# Patient Record
Sex: FEMALE | Race: WHITE | Employment: OTHER | URBAN - METROPOLITAN AREA
[De-identification: names, ages, dates, MRNs, and addresses within clinical notes are randomized per-mention and may not be internally consistent; named-entity substitution may affect disease eponyms.]

---

## 2020-06-24 VITALS — HEIGHT: 67 IN | BODY MASS INDEX: 31.39 KG/M2 | WEIGHT: 200 LBS

## 2020-06-24 PROBLEM — I63.9 STROKE (CEREBRUM) (HCC): Status: ACTIVE | Noted: 2020-06-24

## 2020-06-24 PROBLEM — F32.A DEPRESSION: Status: ACTIVE | Noted: 2020-06-24

## 2020-06-24 PROBLEM — I10 HYPERTENSION: Status: ACTIVE | Noted: 2020-06-24

## 2020-06-24 PROBLEM — E78.5 HYPERLIPIDEMIA: Status: ACTIVE | Noted: 2020-06-24

## 2020-06-24 PROBLEM — K21.9 GERD (GASTROESOPHAGEAL REFLUX DISEASE): Status: ACTIVE | Noted: 2020-06-24

## 2020-06-24 PROBLEM — J98.4 PULMONARY DISEASE: Status: ACTIVE | Noted: 2020-06-24

## 2020-06-24 PROBLEM — G47.9 SLEEP DISORDER: Status: ACTIVE | Noted: 2020-06-24

## 2020-06-24 PROBLEM — M19.90 ARTHRITIS: Status: ACTIVE | Noted: 2020-06-24

## 2020-06-24 PROBLEM — F41.9 ANXIETY: Status: ACTIVE | Noted: 2020-06-24

## 2020-06-24 PROBLEM — I51.9 HEART DISEASE: Status: ACTIVE | Noted: 2020-06-24

## 2020-06-24 PROBLEM — D64.9 ANEMIA: Status: ACTIVE | Noted: 2020-06-24

## 2020-06-24 PROBLEM — C50.919 BREAST CANCER (HCC): Status: ACTIVE | Noted: 2020-06-24

## 2020-06-24 RX ORDER — OMEPRAZOLE 40 MG/1
40 CAPSULE, DELAYED RELEASE ORAL DAILY
COMMUNITY
End: 2020-09-21

## 2020-06-24 RX ORDER — AMLODIPINE BESYLATE 2.5 MG/1
TABLET ORAL DAILY
COMMUNITY
End: 2021-04-08

## 2020-06-24 RX ORDER — METOPROLOL SUCCINATE 25 MG/1
TABLET, EXTENDED RELEASE ORAL DAILY
COMMUNITY
End: 2020-12-17 | Stop reason: SDUPTHER

## 2020-06-24 RX ORDER — ATORVASTATIN CALCIUM 40 MG/1
TABLET, FILM COATED ORAL DAILY
COMMUNITY
End: 2020-12-17 | Stop reason: SDUPTHER

## 2020-06-24 RX ORDER — ALPRAZOLAM 1 MG/1
TABLET ORAL 2 TIMES DAILY
COMMUNITY
End: 2020-11-16 | Stop reason: SDUPTHER

## 2020-06-24 RX ORDER — ASPIRIN 81 MG/1
TABLET ORAL DAILY
COMMUNITY

## 2020-06-24 RX ORDER — BUSPIRONE HYDROCHLORIDE 5 MG/1
TABLET ORAL
COMMUNITY
End: 2020-08-13

## 2020-06-24 RX ORDER — ALENDRONATE SODIUM 35 MG/1
TABLET ORAL
COMMUNITY
End: 2020-08-22

## 2020-08-13 ENCOUNTER — VIRTUAL VISIT (OUTPATIENT)
Dept: BEHAVIORAL/MENTAL HEALTH CLINIC | Age: 80
End: 2020-08-13
Payer: MEDICARE

## 2020-08-13 DIAGNOSIS — F41.1 GENERALIZED ANXIETY DISORDER: Primary | ICD-10-CM

## 2020-08-13 PROCEDURE — 99441 PR PHYS/QHP TELEPHONE EVALUATION 5-10 MIN: CPT | Performed by: NURSE PRACTITIONER

## 2020-08-13 NOTE — PROGRESS NOTES
Jessica Steve is a 78 y.o. female who presents today for the following:  Chief Complaint   Patient presents with    Follow-up     \"I'm doing good. \"    Anxiety     Patient consents to phone visit. Allergies   Allergen Reactions    Pcn [Penicillins] Unknown (comments)       Current Outpatient Medications   Medication Sig    alendronate (FOSAMAX) 35 mg tablet Take  by mouth every seven (7) days.  ALPRAZolam (XANAX) 1 mg tablet Take  by mouth two (2) times a day.  amLODIPine (NORVASC) 2.5 mg tablet Take  by mouth daily.  aspirin delayed-release 81 mg tablet Take  by mouth daily.  atorvastatin (LIPITOR) 40 mg tablet Take  by mouth daily.  metoprolol succinate (TOPROL-XL) 25 mg XL tablet Take  by mouth daily. Take two tablets daily    omeprazole (PRILOSEC) 40 mg capsule Take 40 mg by mouth daily. No current facility-administered medications for this visit.         Past Medical History:   Diagnosis Date    Anemia 6/24/2020    Anxiety 6/24/2020    Arthritis 6/24/2020    Breast cancer ([facility] 6/24/2020    Depression 6/24/2020    GERD (gastroesophageal reflux disease) 6/24/2020    Heart disease 6/24/2020    Hyperlipidemia 6/24/2020    Hypertension 6/24/2020    Pulmonary disease 6/24/2020    Sleep disorder 6/24/2020    [facility] 6/24/2020 2012       Past Surgical History:   Procedure Laterality Date    HX BREAST LUMPECTOMY  1996    HX TUBAL LIGATION      [place] SURGERY OF BREAST CAPSULE         Family History   Problem Relation Age of Onset    Hypertension Mother     Hypertension Sister     Alzheimer Sister     Cancer Brother        Social History     Socioeconomic History    Marital status:      Spouse name: Not on file    Number of children: 3    Years of education: Not on file    Highest education level: High school graduate   Occupational History    Not on file   Social Needs    Financial resource strain: Not on file    Food insecurity     Worry: Never true     Inability: Never true    Transportation needs     Medical: No     Non-medical: No   Tobacco Use    Smoking status: Former Smoker    Smokeless tobacco: Never Used   Substance and Sexual Activity    Alcohol use: Not Currently    Drug use: Never    Sexual activity: Not Currently   Lifestyle    Physical activity     Days per week: Not on file     Minutes per session: Not on file    Stress: Not on file   Relationships    Social connections     Talks on phone: Not on file     Gets together: Not on file     Attends Anabaptist service: Not on file     Active member of club or organization: Not on file     Attends meetings of clubs or organizations: Not on file     Relationship status: Not on file    Intimate partner violence     Fear of current or ex partner: Not on file     Emotionally abused: Not on file     Physically abused: Not on file     Forced sexual activity: Not on file   Other Topics Concern     Service Not Asked    Blood Transfusions Not Asked    Caffeine Concern Not Asked    Occupational Exposure Not Asked   Annamarie Estefani Hazards Not Asked    Sleep Concern Not Asked    Stress Concern Not Asked    Weight Concern Not Asked    Special Diet Not Asked    Back Care Not Asked    Exercise Not Asked    Bike Helmet Not Asked   [address] Not Asked    Self-Exams Not Asked   Social History Narrative    Not on file         Mrs. Elizabeth consents to telephone visit. She last visited the clinic February 3, 2020, which she was prescribed BuSpar for anxiety and sleep. Patient reports that BuSpar was ineffective so she stopped taking it. She is using over-the-counter melatonin as needed for sleep. She continues Xanax 1 mg tablet take 1 tablet twice daily as needed for anxiety. Patient reports that she follows up with her cardiologist Dr. Negrita Guzman and primary care as appropriate. According to patient, she had a fall in March. Patient denies history of frequent falling.   According to patient, she slipped and fell off her porch. She denies any serious injuries. Patient also reports that she was treated for \"urinary tract infection and E. Coli\" recently. Patient denies feeling depressed or anxious. She reports stable mood, sleep and appetite. No psychotic symptoms noted. Patient lives alone but talks with her family daily. She reports good family support. Patient denies suicidal and homicidal thinking. Risk for suicide is low-protective factor-family. No smoking, alcohol or drug use noted. Review of Systems   Respiratory: Positive for shortness of breath (chronic). Musculoskeletal: Positive for falls (last fall in March 2020). All other systems reviewed and are negative. There were no vitals taken for this visit. Physical Exam  Neurological:      Mental Status: She is alert and oriented to person, place, and time. Psychiatric:         Attention and Perception: Attention and perception normal.         Mood and Affect: Mood and affect normal.         Speech: Speech normal.         Behavior: Behavior normal. Behavior is cooperative. Thought Content: Thought content normal.         Cognition and Memory: Cognition and memory normal.         Judgment: Judgment normal.         Plan:    Continue Xanax 1 mg tablet take 1 tablet twice daily as needed for anxiety. She may continue over-the-counter melatonin as needed. Patient is advised to avoid alcohol and drug use. Patient is aware of risks associated with benzodiazepine use in the elderly-increased risk for falls and confusion. Patient is reluctant to Xanax dose reduction recommendation and is requesting to continue medication as prescribed. Advised patient to call 911 or go to the emergency department for suicidal or homicidal thinking. There are no diagnoses linked to this encounter.

## 2020-08-22 RX ORDER — ALENDRONATE SODIUM 35 MG/1
TABLET ORAL
Qty: 12 TAB | Refills: 1 | Status: SHIPPED | OUTPATIENT
Start: 2020-08-22 | End: 2021-09-02 | Stop reason: ALTCHOICE

## 2020-09-21 RX ORDER — OMEPRAZOLE 40 MG/1
CAPSULE, DELAYED RELEASE ORAL
Qty: 90 CAP | Refills: 1 | Status: SHIPPED | OUTPATIENT
Start: 2020-09-21 | End: 2021-03-11 | Stop reason: SDUPTHER

## 2020-10-08 ENCOUNTER — TELEPHONE (OUTPATIENT)
Dept: PRIMARY CARE CLINIC | Age: 80
End: 2020-10-08

## 2020-10-08 DIAGNOSIS — Z13.820 SCREENING FOR OSTEOPOROSIS: Primary | ICD-10-CM

## 2020-10-12 DIAGNOSIS — M81.0 AGE-RELATED OSTEOPOROSIS WITHOUT CURRENT PATHOLOGICAL FRACTURE: Primary | ICD-10-CM

## 2020-10-19 ENCOUNTER — OFFICE VISIT (OUTPATIENT)
Dept: PRIMARY CARE CLINIC | Age: 80
End: 2020-10-19
Payer: MEDICARE

## 2020-10-19 DIAGNOSIS — Z23 ENCOUNTER FOR IMMUNIZATION: Primary | ICD-10-CM

## 2020-10-19 PROCEDURE — 90756 CCIIV4 VACC ABX FREE IM: CPT | Performed by: FAMILY MEDICINE

## 2020-10-19 PROCEDURE — G0008 ADMIN INFLUENZA VIRUS VAC: HCPCS | Performed by: FAMILY MEDICINE

## 2020-11-05 ENCOUNTER — OFFICE VISIT (OUTPATIENT)
Dept: PRIMARY CARE CLINIC | Age: 80
End: 2020-11-05
Payer: MEDICARE

## 2020-11-05 VITALS
BODY MASS INDEX: 31.13 KG/M2 | DIASTOLIC BLOOD PRESSURE: 78 MMHG | HEART RATE: 78 BPM | WEIGHT: 198.38 LBS | RESPIRATION RATE: 18 BRPM | HEIGHT: 67 IN | TEMPERATURE: 96 F | OXYGEN SATURATION: 94 % | SYSTOLIC BLOOD PRESSURE: 144 MMHG

## 2020-11-05 DIAGNOSIS — K21.9 GASTROESOPHAGEAL REFLUX DISEASE WITHOUT ESOPHAGITIS: ICD-10-CM

## 2020-11-05 DIAGNOSIS — I10 ESSENTIAL HYPERTENSION: ICD-10-CM

## 2020-11-05 DIAGNOSIS — M81.0 AGE-RELATED OSTEOPOROSIS WITHOUT CURRENT PATHOLOGICAL FRACTURE: ICD-10-CM

## 2020-11-05 DIAGNOSIS — I51.9 HEART DISEASE: Primary | ICD-10-CM

## 2020-11-05 DIAGNOSIS — I63.9 CEREBROVASCULAR ACCIDENT (CVA), UNSPECIFIED MECHANISM (HCC): ICD-10-CM

## 2020-11-05 DIAGNOSIS — E55.9 VITAMIN D DEFICIENCY: ICD-10-CM

## 2020-11-05 DIAGNOSIS — J45.20 MILD INTERMITTENT ASTHMA WITHOUT COMPLICATION: ICD-10-CM

## 2020-11-05 DIAGNOSIS — R53.83 OTHER FATIGUE: ICD-10-CM

## 2020-11-05 PROBLEM — G47.33 OBSTRUCTIVE SLEEP APNEA SYNDROME: Status: ACTIVE | Noted: 2020-11-05

## 2020-11-05 PROCEDURE — 99214 OFFICE O/P EST MOD 30 MIN: CPT | Performed by: FAMILY MEDICINE

## 2020-11-05 RX ORDER — LANOLIN ALCOHOL/MO/W.PET/CERES
9 CREAM (GRAM) TOPICAL
COMMUNITY

## 2020-11-05 RX ORDER — GLUCOSAMINE SULFATE 1500 MG
1000 POWDER IN PACKET (EA) ORAL DAILY
COMMUNITY

## 2020-11-05 RX ORDER — ALBUTEROL SULFATE 90 UG/1
2 AEROSOL, METERED RESPIRATORY (INHALATION)
Qty: 1 INHALER | Refills: 3 | Status: SHIPPED | OUTPATIENT
Start: 2020-11-05 | End: 2022-07-14 | Stop reason: SDUPTHER

## 2020-11-05 NOTE — PROGRESS NOTES
Ekaterina Castro is a [de-identified] y.o. female who presents today for the following:  Chief Complaint   Patient presents with    Fall     States fell Oct. 26,2020 and wants to ask some questions re: this fall. Had bone density scheduled but unable to go due to fall.  Request For New Medication     Wants to discuss injection for Osteoporosis instead of taking the Fosamax every day.   ,     ICD-10-CM ICD-9-CM    1. Heart disease  I51.9 429.9 LIPID PANEL   2. Essential hypertension  I10 401.9 CBC WITH AUTOMATED DIFF      METABOLIC PANEL, COMPREHENSIVE      URINALYSIS W/ REFLEX CULTURE   3. Cerebrovascular accident (CVA), unspecified mechanism ([facility]  I63.9 434.91    4. Gastroesophageal reflux disease without esophagitis  K21.9 530.81    5. Mild intermittent asthma without complication  J32.90 473.57 albuterol (PROVENTIL HFA, VENTOLIN HFA, PROAIR HFA) 90 mcg/actuation inhaler   6. Vitamin D deficiency  E55.9 268.9 VITAMIN D, 25 HYDROXY   7. Age-related osteoporosis without current pathological fracture  M81.0 733.01 VITAMIN D, 25 HYDROXY   8. Other fatigue  R53.83 780.79 TSH 3RD GENERATION    . This patient comes in for follow-up of her hypertension, osteoporosis, vitamin D deficiency, fatigue, intermittent asthma. Her main complaint is that she fell and has some discomfort in the knees this is slowly improving but she is concerned something else is going on. Discussed getting x-rays but she does not feel she needs this yet. She would like to use Prolia instead of the Fosamax. Has a bone density test scheduled that she delayed after the fall and will plan to do this in the next week. We will see what her bone density test shows and make a final decision on that when the results are back. She says she continues to feel fatigued. We have checked test in the past for this. We will redo lab work and see if we have a reason that she stays fatigued.   This could be related to her age and multiple medical problems. Allergies   Allergen Reactions    Incruse Ellipta [Umeclidinium] Other (comments)     Throat felt funny and red on the outside on neck area.  Pcn [Penicillins] Unknown (comments)     States was in hospital a long time ago (in her 19's). Does'nt remember reaction she had. Current Outpatient Medications   Medication Sig    melatonin 3 mg tablet Take 3 mg by mouth nightly as needed for Insomnia.  cholecalciferol (Vitamin D3) 25 mcg (1,000 unit) cap Take 1,000 Units by mouth daily.  albuterol (PROVENTIL HFA, VENTOLIN HFA, PROAIR HFA) 90 mcg/actuation inhaler Take 2 Puffs by inhalation every six (6) hours as needed for Wheezing.  omeprazole (PRILOSEC) 40 mg capsule TAKE 1 CAPSULE BY MOUTH EVERY DAY    ALPRAZolam (XANAX) 1 mg tablet Take  by mouth two (2) times a day.  amLODIPine (NORVASC) 2.5 mg tablet Take  by mouth daily.  aspirin delayed-release 81 mg tablet Take  by mouth daily.  atorvastatin (LIPITOR) 40 mg tablet Take  by mouth daily.  metoprolol succinate (TOPROL-XL) 25 mg XL tablet Take  by mouth daily. Take two tablets daily    alendronate (FOSAMAX) 35 mg tablet TAKE 1 TABLET WEEKLY ON AN EMPTY STOMACH 30-60 MIN BEFORE BREAKFAST     No current facility-administered medications for this visit.         Past Medical History:   Diagnosis Date    Anemia 6/24/2020    Anxiety 6/24/2020    Arthritis 6/24/2020    Breast cancer ([facility] 6/24/2020    Depression 6/24/2020    GERD (gastroesophageal reflux disease) 6/24/2020    Heart disease 6/24/2020    Hyperlipidemia 6/24/2020    Hypertension 6/24/2020    Pulmonary disease 6/24/2020    Sleep disorder 6/24/2020    Stroke (cerebrum) ([address] 6/24/2020 2012       Past Surgical History:   Procedure Laterality Date    HX BREAST LUMPECTOMY  1996    HX TUBAL LIGATION      [place] SURGERY OF BREAST CAPSULE         Family History   Problem Relation Age of Onset    Hypertension Mother     Hypertension Sister     Alzheimer Sister  Cancer Brother        Social History     Socioeconomic History    Marital status:      Spouse name: Not on file    Number of children: 3    Years of education: Not on file    Highest education level: High school graduate   Occupational History    Not on file   Social Needs    Financial resource strain: Not on file    Food insecurity     Worry: Never true     Inability: Never true    Transportation needs     Medical: No     Non-medical: No   Tobacco Use    Smoking status: Former Smoker    Smokeless tobacco: Never Used   Substance and Sexual Activity    Alcohol use: Not Currently    Drug use: Never    Sexual activity: Not Currently   Lifestyle    Physical activity     Days per week: Not on file     Minutes per session: Not on file    Stress: Not on file   Relationships    Social connections     Talks on phone: Not on file     Gets together: Not on file     Attends Hindu service: Not on file     Active member of club or organization: Not on file     Attends meetings of clubs or organizations: Not on file     Relationship status: Not on file    Intimate partner violence     Fear of current or ex partner: Not on file     Emotionally abused: Not on file     Physically abused: Not on file     Forced sexual activity: Not on file   Other Topics Concern     Service Not Asked    Blood Transfusions Not Asked    Caffeine Concern Not Asked    Occupational Exposure Not Asked    Hobby Hazards Not Asked    Sleep Concern Not Asked    Stress Concern Not Asked    Weight Concern Not Asked    Special Diet Not Asked    Back Care Not Asked    Exercise Not Asked    Bike Helmet Not Asked   [address] Not Asked    Self-Exams Not Asked   Social History Narrative    Not on file         Review of Systems   Constitutional: Negative. Respiratory: Negative. Cardiovascular: Negative. Gastrointestinal: Negative. Genitourinary: Positive for hematuria.    Musculoskeletal: Positive for falls, joint pain, myalgias and neck pain. Neurological: Positive for weakness and headaches. Psychiatric/Behavioral: Negative. All other systems reviewed and are negative. Visit Vitals  BP (!) 144/78 (BP 1 Location: Left arm, BP Patient Position: Sitting)   Pulse 78   Temp (!) 96 °F (35.6 °C) (Skin)   Resp 18   Ht 5' 7\" (1.702 m)   Wt 198 lb 6 oz (90 kg)   SpO2 94%   BMI 31.07 kg/m²       Physical Exam  Vitals signs and nursing note reviewed. Constitutional:       Appearance: Normal appearance. She is normal weight. Neck:      Musculoskeletal: Neck supple. Muscular tenderness present. Cardiovascular:      Rate and Rhythm: Normal rate and regular rhythm. Pulses: Normal pulses. Heart sounds: Normal heart sounds. Pulmonary:      Effort: Pulmonary effort is normal.      Breath sounds: Normal breath sounds. Abdominal:      General: Abdomen is flat. Bowel sounds are normal.      Palpations: Abdomen is soft. Musculoskeletal: Normal range of motion. Skin:     General: Skin is warm and dry. Findings: Bruising (Left knee) present. Neurological:      General: No focal deficit present. Mental Status: She is alert. Psychiatric:         Mood and Affect: Mood normal.         Behavior: Behavior normal.         Thought Content: Thought content normal.         Judgment: Judgment normal.            ICD-10-CM ICD-9-CM    1. Heart disease  I51.9 429.9 LIPID PANEL   2. Essential hypertension  I10 401.9 CBC WITH AUTOMATED DIFF      METABOLIC PANEL, COMPREHENSIVE      URINALYSIS W/ REFLEX CULTURE   3. Cerebrovascular accident (CVA), unspecified mechanism ([facility]  I63.9 434.91    4. Gastroesophageal reflux disease without esophagitis  K21.9 530.81    5. Mild intermittent asthma without complication  J12.83 278.95 albuterol (PROVENTIL HFA, VENTOLIN HFA, PROAIR HFA) 90 mcg/actuation inhaler   6.  Vitamin D deficiency  E55.9 268.9 VITAMIN D, 25 HYDROXY   7. Age-related osteoporosis without current pathological fracture  M81.0 733.01 VITAMIN D, 25 HYDROXY   8. Other fatigue  R53.83 780.79 TSH 3RD GENERATION        1. Heart disease    - LIPID PANEL    2. Essential hypertension    - CBC WITH AUTOMATED DIFF  - METABOLIC PANEL, COMPREHENSIVE  - URINALYSIS W/ REFLEX CULTURE    3. Cerebrovascular accident (CVA), unspecified mechanism ([facility]      4. Gastroesophageal reflux disease without esophagitis      5. Mild intermittent asthma without complication  Stable and she uses her inhaler as needed. - albuterol (PROVENTIL HFA, VENTOLIN HFA, PROAIR HFA) 90 mcg/actuation inhaler; Take 2 Puffs by inhalation every six (6) hours as needed for Wheezing. Dispense: 1 Inhaler; Refill: 3    6. Vitamin D deficiency    - VITAMIN D, 25 HYDROXY    7. Age-related osteoporosis without current pathological fracture    - VITAMIN D, 25 HYDROXY    8.  Other fatigue    - TSH 3RD GENERATION

## 2020-11-06 LAB
25(OH)D3+25(OH)D2 SERPL-MCNC: 48 NG/ML (ref 30–100)
ALBUMIN SERPL-MCNC: 4.1 G/DL (ref 3.7–4.7)
ALBUMIN/GLOB SERPL: 1.7 {RATIO} (ref 1.2–2.2)
ALP SERPL-CCNC: 111 IU/L (ref 39–117)
ALT SERPL-CCNC: 18 IU/L (ref 0–32)
APPEARANCE UR: CLEAR
AST SERPL-CCNC: 26 IU/L (ref 0–40)
BACTERIA #/AREA URNS HPF: ABNORMAL /[HPF]
BASOPHILS # BLD AUTO: 0 X10E3/UL (ref 0–0.2)
BASOPHILS NFR BLD AUTO: 1 %
BILIRUB SERPL-MCNC: 0.3 MG/DL (ref 0–1.2)
BILIRUB UR QL STRIP: NEGATIVE
BUN SERPL-MCNC: 17 MG/DL (ref 8–27)
BUN/CREAT SERPL: 23 (ref 12–28)
CALCIUM SERPL-MCNC: 9.2 MG/DL (ref 8.7–10.3)
CASTS URNS MICRO: ABNORMAL
CASTS URNS QL MICRO: PRESENT /LPF
CHLORIDE SERPL-SCNC: 105 MMOL/L (ref 96–106)
CHOLEST SERPL-MCNC: 153 MG/DL (ref 100–199)
CO2 SERPL-SCNC: 25 MMOL/L (ref 20–29)
COLOR UR: YELLOW
CREAT SERPL-MCNC: 0.75 MG/DL (ref 0.57–1)
EOSINOPHIL # BLD AUTO: 0.2 X10E3/UL (ref 0–0.4)
EOSINOPHIL NFR BLD AUTO: 3 %
EPI CELLS #/AREA URNS HPF: ABNORMAL /HPF (ref 0–10)
ERYTHROCYTE [DISTWIDTH] IN BLOOD BY AUTOMATED COUNT: 13 % (ref 11.7–15.4)
GLOBULIN SER CALC-MCNC: 2.4 G/DL (ref 1.5–4.5)
GLUCOSE SERPL-MCNC: 103 MG/DL (ref 65–99)
GLUCOSE UR QL: NEGATIVE
HCT VFR BLD AUTO: 35.9 % (ref 34–46.6)
HDLC SERPL-MCNC: 65 MG/DL
HGB BLD-MCNC: 11.9 G/DL (ref 11.1–15.9)
HGB UR QL STRIP: NEGATIVE
IMM GRANULOCYTES # BLD AUTO: 0 X10E3/UL (ref 0–0.1)
IMM GRANULOCYTES NFR BLD AUTO: 0 %
KETONES UR QL STRIP: ABNORMAL
LDLC SERPL CALC-MCNC: 71 MG/DL (ref 0–99)
LEUKOCYTE ESTERASE UR QL STRIP: NEGATIVE
LYMPHOCYTES # BLD AUTO: 1.7 X10E3/UL (ref 0.7–3.1)
LYMPHOCYTES NFR BLD AUTO: 30 %
MCH RBC QN AUTO: 28.7 PG (ref 26.6–33)
MCHC RBC AUTO-ENTMCNC: 33.1 G/DL (ref 31.5–35.7)
MCV RBC AUTO: 87 FL (ref 79–97)
MICRO URNS: ABNORMAL
MICRO URNS: ABNORMAL
MONOCYTES # BLD AUTO: 0.4 X10E3/UL (ref 0.1–0.9)
MONOCYTES NFR BLD AUTO: 7 %
MUCOUS THREADS URNS QL MICRO: PRESENT
NEUTROPHILS # BLD AUTO: 3.3 X10E3/UL (ref 1.4–7)
NEUTROPHILS NFR BLD AUTO: 59 %
NITRITE UR QL STRIP: NEGATIVE
PH UR STRIP: 6 [PH] (ref 5–7.5)
PLATELET # BLD AUTO: 202 X10E3/UL (ref 150–450)
POTASSIUM SERPL-SCNC: 4.3 MMOL/L (ref 3.5–5.2)
PROT SERPL-MCNC: 6.5 G/DL (ref 6–8.5)
PROT UR QL STRIP: NEGATIVE
RBC # BLD AUTO: 4.14 X10E6/UL (ref 3.77–5.28)
RBC #/AREA URNS HPF: ABNORMAL /HPF (ref 0–2)
SODIUM SERPL-SCNC: 142 MMOL/L (ref 134–144)
SP GR UR: 1.02 (ref 1–1.03)
TRIGL SERPL-MCNC: 90 MG/DL (ref 0–149)
TSH SERPL DL<=0.005 MIU/L-ACNC: 1.94 UIU/ML (ref 0.45–4.5)
URINALYSIS REFLEX, 377202: ABNORMAL
UROBILINOGEN UR STRIP-MCNC: 0.2 MG/DL (ref 0.2–1)
VLDLC SERPL CALC-MCNC: 17 MG/DL (ref 5–40)
WBC # BLD AUTO: 5.6 X10E3/UL (ref 3.4–10.8)
WBC #/AREA URNS HPF: ABNORMAL /HPF (ref 0–5)

## 2020-11-16 ENCOUNTER — VIRTUAL VISIT (OUTPATIENT)
Dept: BEHAVIORAL/MENTAL HEALTH CLINIC | Age: 80
End: 2020-11-16
Payer: MEDICARE

## 2020-11-16 DIAGNOSIS — F32.5 MAJOR DEPRESSION IN REMISSION (HCC): ICD-10-CM

## 2020-11-16 DIAGNOSIS — F41.1 GENERALIZED ANXIETY DISORDER: Primary | ICD-10-CM

## 2020-11-16 PROCEDURE — 99441 PR PHYS/QHP TELEPHONE EVALUATION 5-10 MIN: CPT | Performed by: NURSE PRACTITIONER

## 2020-11-16 RX ORDER — ALPRAZOLAM 1 MG/1
1 TABLET ORAL
Qty: 60 TAB | Refills: 0 | Status: SHIPPED | OUTPATIENT
Start: 2020-11-16 | End: 2021-01-06

## 2020-11-16 NOTE — PROGRESS NOTES
Jared Bassett is a [de-identified] y.o. female who presents today for the following:  Chief Complaint   Patient presents with    Follow-up     \"I'm tired of staying at home. \"    Anxiety     Patient is unable to do virtual visit, telephone visit required. Allergies   Allergen Reactions    Incruse Ellipta [Umeclidinium] Other (comments)     Throat felt funny and red on the outside on neck area.  Pcn [Penicillins] Unknown (comments)     States was in hospital a long time ago (in her 19's). Does'nt remember reaction she had. Current Outpatient Medications   Medication Sig    melatonin 3 mg tablet Take 3 mg by mouth nightly as needed for Insomnia.  cholecalciferol (Vitamin D3) 25 mcg (1,000 unit) cap Take 1,000 Units by mouth daily.  albuterol (PROVENTIL HFA, VENTOLIN HFA, PROAIR HFA) 90 mcg/actuation inhaler Take 2 Puffs by inhalation every six (6) hours as needed for Wheezing.  omeprazole (PRILOSEC) 40 mg capsule TAKE 1 CAPSULE BY MOUTH EVERY DAY    alendronate (FOSAMAX) 35 mg tablet TAKE 1 TABLET WEEKLY ON AN EMPTY STOMACH 30-60 MIN BEFORE BREAKFAST    ALPRAZolam (XANAX) 1 mg tablet Take  by mouth two (2) times a day.  amLODIPine (NORVASC) 2.5 mg tablet Take  by mouth daily.  aspirin delayed-release 81 mg tablet Take  by mouth daily.  atorvastatin (LIPITOR) 40 mg tablet Take  by mouth daily.  metoprolol succinate (TOPROL-XL) 25 mg XL tablet Take  by mouth daily. Take two tablets daily     No current facility-administered medications for this visit.         Past Medical History:   Diagnosis Date    Anemia 6/24/2020    Anxiety 6/24/2020    Arthritis 6/24/2020    Breast cancer ([facility] 6/24/2020    Depression 6/24/2020    GERD (gastroesophageal reflux disease) 6/24/2020    Heart disease 6/24/2020    Hyperlipidemia 6/24/2020    Hypertension 6/24/2020    Pulmonary disease 6/24/2020    Sleep disorder 6/24/2020    Stroke (cerebrum) ([facility] 6/24/2020 2012       Past Surgical History: Procedure Laterality Date    HX BREAST LUMPECTOMY  1996    HX TUBAL LIGATION      [place] SURGERY OF BREAST CAPSULE         Family History   Problem Relation Age of Onset    Hypertension Mother     Hypertension Sister     Alzheimer Sister     Cancer Brother        Social History     Socioeconomic History    Marital status:      Spouse name: Not on file    Number of children: 3    Years of education: Not on file    Highest education level: High school graduate   Occupational History    Not on file   Social Needs    Financial resource strain: Not on file    Food insecurity     Worry: Never true     Inability: Never true    Transportation needs     Medical: No     Non-medical: No   Tobacco Use    Smoking status: Former Smoker    Smokeless tobacco: Never Used   Substance and Sexual Activity    Alcohol use: Not Currently    Drug use: Never    Sexual activity: Not Currently   Lifestyle    Physical activity     Days per week: Not on file     Minutes per session: Not on file    Stress: Not on file   Relationships    Social connections     Talks on phone: Not on file     Gets together: Not on file     Attends Jainism service: Not on file     Active member of club or organization: Not on file     Attends meetings of clubs or organizations: Not on file     Relationship status: Not on file    Intimate partner violence     Fear of current or ex partner: Not on file     Emotionally abused: Not on file     Physically abused: Not on file     Forced sexual activity: Not on file   Other Topics Concern     Service Not Asked    Blood Transfusions Not Asked    Caffeine Concern Not Asked    Occupational Exposure Not Asked    Hobby Hazards Not Asked    Sleep Concern Not Asked    Stress Concern Not Asked    Weight Concern Not Asked    Special Diet Not Asked    Back Care Not Asked    Exercise Not Asked    Bike Helmet Not Asked    Seat Belt Not Asked    Self-Exams Not Asked   Social History Narrative    Not on file         Ms. Elizabeth consents to phone visit. Patient is prescribed Xanax 1 mg tablet take 1 tablet twice daily as needed for anxiety. She follows up with Dr. Hugh Rapp for primary care. Patient follows up in the clinic for generalized anxiety disorder. On today's visit, she is requesting xanax refill. Patient denies feeling depressed or anxious on today's visit. She reports having some fleeting anxiety episodes which is well controlled with Xanax as needed. Patient shares that most of her anxiety is related to her neighbor's cats getting on her car. She is requesting to continue Xanax as prescribed. Patient is fully aware of risks associated with benzodiazepine use in the elderly such as increased risk for falls and confusion. Patient reports that she had a fall October 26, 2020 when she tripped over a wire. She reports a neighbor helped her up. Patient reports that she followed up with her primary care for medical evaluation which everything \"checked out okay. \"  She reports fluctuating sleep and appetite. Weight has been stable. No psychotic symptoms reported. Patient denies suicidal/homicidal thinking, risk for suicide is low. No smoking, alcohol or drug use noted. Patient lives alone with good family and social support. Review of Systems   Musculoskeletal: Positive for joint pain. All other systems reviewed and are negative. There were no vitals taken for this visit. Physical Exam  Psychiatric:         Attention and Perception: Attention and perception normal.         Mood and Affect: Mood normal.         Speech: Speech normal.         Behavior: Behavior normal. Behavior is cooperative. Thought Content: Thought content normal.         Cognition and Memory: Cognition and memory normal.         Judgment: Judgment normal.        Plan: At the patient's request, continue Xanax 1 mg tablet take 1 tablet twice daily as needed for anxiety.   Follow-up with primary care provider as appropriate. Advised patient to call 911 or go to the emergency department for emergencies and suicidal/homicidal thinking. Advised patient to avoid alcohol and drug use. There are no diagnoses linked to this encounter.

## 2020-11-19 ENCOUNTER — TELEPHONE (OUTPATIENT)
Dept: PRIMARY CARE CLINIC | Age: 80
End: 2020-11-19

## 2020-11-19 NOTE — TELEPHONE ENCOUNTER
----- Message from Nelida Dent MD sent at 11/18/2020  1:31 PM EST -----  Inform the patient that her lab results were normal except for the following:  Her lab work was all normal.  Continue the current medication and work on exercise and weight loss if possible. Recommend fasting office visit in 6 months.

## 2020-12-17 ENCOUNTER — TELEPHONE (OUTPATIENT)
Dept: PRIMARY CARE CLINIC | Age: 80
End: 2020-12-17

## 2020-12-17 ENCOUNTER — TELEPHONE (OUTPATIENT)
Dept: BEHAVIORAL/MENTAL HEALTH CLINIC | Age: 80
End: 2020-12-17

## 2020-12-17 DIAGNOSIS — I10 ESSENTIAL HYPERTENSION: ICD-10-CM

## 2020-12-17 DIAGNOSIS — I10 ESSENTIAL HYPERTENSION: Primary | ICD-10-CM

## 2020-12-17 DIAGNOSIS — E78.5 HYPERLIPIDEMIA, UNSPECIFIED HYPERLIPIDEMIA TYPE: ICD-10-CM

## 2020-12-17 RX ORDER — ATORVASTATIN CALCIUM 40 MG/1
40 TABLET, FILM COATED ORAL DAILY
Qty: 90 TAB | Refills: 0 | Status: SHIPPED | OUTPATIENT
Start: 2020-12-17 | End: 2021-03-08 | Stop reason: SDUPTHER

## 2020-12-17 RX ORDER — METOPROLOL SUCCINATE 25 MG/1
25 TABLET, EXTENDED RELEASE ORAL DAILY
Qty: 90 TAB | Refills: 0 | Status: SHIPPED | OUTPATIENT
Start: 2020-12-17 | End: 2021-01-29 | Stop reason: SDUPTHER

## 2021-01-06 DIAGNOSIS — F41.1 GENERALIZED ANXIETY DISORDER: ICD-10-CM

## 2021-01-06 RX ORDER — ALPRAZOLAM 1 MG/1
1 TABLET ORAL
Qty: 60 TAB | Refills: 0 | Status: SHIPPED | OUTPATIENT
Start: 2021-01-06 | End: 2021-02-05

## 2021-01-22 ENCOUNTER — TRANSCRIBE ORDER (OUTPATIENT)
Dept: SCHEDULING | Age: 81
End: 2021-01-22

## 2021-01-22 DIAGNOSIS — R01.1 MURMUR: Primary | ICD-10-CM

## 2021-01-29 DIAGNOSIS — I10 ESSENTIAL HYPERTENSION: ICD-10-CM

## 2021-01-29 RX ORDER — METOPROLOL SUCCINATE 25 MG/1
25 TABLET, EXTENDED RELEASE ORAL DAILY
Qty: 90 TAB | Refills: 0 | Status: SHIPPED | OUTPATIENT
Start: 2021-01-29 | End: 2021-03-17 | Stop reason: SDUPTHER

## 2021-02-15 ENCOUNTER — VIRTUAL VISIT (OUTPATIENT)
Dept: BEHAVIORAL/MENTAL HEALTH CLINIC | Age: 81
End: 2021-02-15
Payer: MEDICARE

## 2021-02-15 DIAGNOSIS — F41.1 GENERALIZED ANXIETY DISORDER: Primary | ICD-10-CM

## 2021-02-15 PROCEDURE — G8536 NO DOC ELDER MAL SCRN: HCPCS | Performed by: NURSE PRACTITIONER

## 2021-02-15 PROCEDURE — 1100F PTFALLS ASSESS-DOCD GE2>/YR: CPT | Performed by: NURSE PRACTITIONER

## 2021-02-15 PROCEDURE — 99442 PR PHYS/QHP TELEPHONE EVALUATION 11-20 MIN: CPT | Performed by: NURSE PRACTITIONER

## 2021-02-15 PROCEDURE — 1090F PRES/ABSN URINE INCON ASSESS: CPT | Performed by: NURSE PRACTITIONER

## 2021-02-15 PROCEDURE — G8756 NO BP MEASURE DOC: HCPCS | Performed by: NURSE PRACTITIONER

## 2021-02-15 PROCEDURE — G8400 PT W/DXA NO RESULTS DOC: HCPCS | Performed by: NURSE PRACTITIONER

## 2021-02-15 PROCEDURE — 3288F FALL RISK ASSESSMENT DOCD: CPT | Performed by: NURSE PRACTITIONER

## 2021-02-15 PROCEDURE — G8417 CALC BMI ABV UP PARAM F/U: HCPCS | Performed by: NURSE PRACTITIONER

## 2021-02-15 PROCEDURE — G9717 DOC PT DX DEP/BP F/U NT REQ: HCPCS | Performed by: NURSE PRACTITIONER

## 2021-02-15 PROCEDURE — G8427 DOCREV CUR MEDS BY ELIG CLIN: HCPCS | Performed by: NURSE PRACTITIONER

## 2021-02-15 RX ORDER — ALPRAZOLAM 1 MG/1
1 TABLET ORAL
Qty: 60 TAB | Refills: 2 | Status: SHIPPED | OUTPATIENT
Start: 2021-02-15 | End: 2021-03-17

## 2021-02-15 NOTE — PROGRESS NOTES
Octavio Eller is a [de-identified] y.o. female who presents today for the following:  Chief Complaint   Patient presents with    Follow-up     \"I need another refill. \"    Medication Management    Leola Blair, who was evaluated through a synchronous (real-time) audio telephone encounter, and/or her healthcare decision maker, is aware that it is a billable service, with coverage as determined by her insurance carrier. She provided verbal consent to proceed: YES Consent obtained within past 12 months: Yes, and patient identification was verified. It was conducted pursuant to the emergency declaration under the [facility], [address] [place] [address] authority and the [employer] Act. A caregiver was present when appropriate. Ability to conduct physical exam was limited. I was home. The patient was home. Length of visit 11 minutes and 19 seconds. Allergies   Allergen Reactions    Incruse Ellipta [Umeclidinium] Other (comments)     Throat felt funny and red on the outside on neck area.  Pcn [Penicillins] Unknown (comments)     States was in hospital a long time ago (in her 19's). Does'nt remember reaction she had. Current Outpatient Medications   Medication Sig    ALPRAZolam (XANAX) 1 mg tablet Take 1 Tab by mouth two (2) times daily as needed for Anxiety for up to 30 days. Max Daily Amount: 2 mg.  metoprolol succinate (TOPROL-XL) 25 mg XL tablet Take 1 Tab by mouth daily. Take two tablets daily    atorvastatin (LIPITOR) 40 mg tablet Take 1 Tab by mouth daily.  melatonin 3 mg tablet Take 3 mg by mouth nightly as needed for Insomnia.  cholecalciferol (Vitamin D3) 25 mcg (1,000 unit) cap Take 1,000 Units by mouth daily.  albuterol (PROVENTIL HFA, VENTOLIN HFA, PROAIR HFA) 90 mcg/actuation inhaler Take 2 Puffs by inhalation every six (6) hours as needed for Wheezing.     omeprazole (PRILOSEC) 40 mg capsule TAKE 1 CAPSULE BY MOUTH EVERY DAY    alendronate (FOSAMAX) 35 mg tablet TAKE 1 TABLET WEEKLY ON AN EMPTY STOMACH 30-60 MIN BEFORE BREAKFAST    amLODIPine (NORVASC) 2.5 mg tablet Take  by mouth daily.  aspirin delayed-release 81 mg tablet Take  by mouth daily. No current facility-administered medications for this visit.         Past Medical History:   Diagnosis Date    Anemia 6/24/2020    Anxiety 6/24/2020    Arthritis 6/24/2020    Breast cancer ([facility] 6/24/2020    Depression 6/24/2020    GERD (gastroesophageal reflux disease) 6/24/2020    Heart disease 6/24/2020    Hyperlipidemia 6/24/2020    Hypertension 6/24/2020    Pulmonary disease 6/24/2020    Sleep disorder 6/24/2020    Stroke (cerebrum) ([facility] 6/24/2020 2012       Past Surgical History:   Procedure Laterality Date    HX BREAST LUMPECTOMY  1996    HX TUBAL LIGATION      MT REVISION AMBROSE-IMPLANT CAPSULE BREAST         Family History   Problem Relation Age of Onset    Hypertension Mother     Hypertension Sister     Alzheimer Sister     Cancer Brother        Social History     Socioeconomic History    Marital status:      Spouse name: Not on file    Number of children: 3    Years of education: Not on file    Highest education level: High school graduate   Occupational History    Not on file   Social Needs    Financial resource strain: Not on file    Food insecurity     Worry: Never true     Inability: Never true    Transportation needs     Medical: No     Non-medical: No   Tobacco Use    Smoking status: Former Smoker    Smokeless tobacco: Never Used   Substance and Sexual Activity    Alcohol use: Not Currently    Drug use: Never    Sexual activity: Not Currently   Lifestyle    Physical activity     Days per week: Not on file     Minutes per session: Not on file    Stress: Not on file   Relationships    Social connections     Talks on phone: Not on file     Gets together: Not on file     Attends Zoroastrian service: Not on file     Active member of club or organization: Not on file     Attends meetings of clubs or organizations: Not on file     Relationship status: Not on file    Intimate partner violence     Fear of current or ex partner: Not on file     Emotionally abused: Not on file     Physically abused: Not on file     Forced sexual activity: Not on file   Other Topics Concern     Service Not Asked    Blood Transfusions Not Asked    Caffeine Concern Not Asked    Occupational Exposure Not Asked   [employer] Dileep Hazards Not Asked    Sleep Concern Not Asked    Stress Concern Not Asked    Weight Concern Not Asked    Special Diet Not Asked    Back Care Not Asked    Exercise Not Asked    Bike Helmet Not Asked   [address] Not Asked    Self-Exams Not Asked   Social History Narrative    Not on file         Ms. Rere Khan is unable to do virtual visit, telephone visit required. Patient is prescribed Xanax 1 mg tablet take 1 tablet twice daily as needed for anxiety. Today, she is requesting Xanax refill. Patient reports doing well since last visit November 16, 2020. She denies feeling anxious or depressed. Patient is not interested in trial Xanax dose reduction at this time. No problems with sleep and appetite. Patient denies psychotic symptoms. No suicidal/homicidal thinking noted, risk for suicide is low, protective factor-family. Patient reports her family is very supportive. Patient shares that her son from [place] may visit her soon. She reports that she tries to stay in as much as possible. She denies smoking, alcohol and drug use. Patient lives alone with good family and social support. Review of Systems   All other systems reviewed and are negative. Patient denies any physical issues. There were no vitals taken for this visit.   Physical Exam  Psychiatric:         Attention and Perception: Attention and perception normal.         Mood and Affect: Mood normal.         Speech: Speech normal.         Behavior: Behavior normal. Behavior is cooperative. Thought Content: Thought content normal.         Cognition and Memory: Cognition and memory normal.         Judgment: Judgment normal.        Plan:    Continue Xanax 1 mg tablet take 1 tablet 2 times daily as needed for anxiety. Advised patient to avoid alcohol and drug use. Follow-up with medical provider as appropriate. Advised patient to call 911 or go to the emergency department for suicidal/homicidal thinking. Patient may call the office for any issues.

## 2021-03-08 DIAGNOSIS — E78.5 HYPERLIPIDEMIA, UNSPECIFIED HYPERLIPIDEMIA TYPE: ICD-10-CM

## 2021-03-08 DIAGNOSIS — E78.5 HYPERLIPIDEMIA, UNSPECIFIED HYPERLIPIDEMIA TYPE: Primary | ICD-10-CM

## 2021-03-08 RX ORDER — ATORVASTATIN CALCIUM 40 MG/1
TABLET, FILM COATED ORAL
Qty: 90 TAB | Refills: 0 | OUTPATIENT
Start: 2021-03-08

## 2021-03-08 RX ORDER — ATORVASTATIN CALCIUM 40 MG/1
40 TABLET, FILM COATED ORAL DAILY
Qty: 90 TAB | Refills: 0 | Status: CANCELLED | OUTPATIENT
Start: 2021-03-08

## 2021-03-08 RX ORDER — ATORVASTATIN CALCIUM 40 MG/1
40 TABLET, FILM COATED ORAL DAILY
Qty: 90 TAB | Refills: 0 | Status: SHIPPED | OUTPATIENT
Start: 2021-03-08 | End: 2021-06-06

## 2021-03-08 NOTE — TELEPHONE ENCOUNTER
Will have RADHA Araujo or RADHA Abdi schedule a fasting OV with pt per Dr. Georgia Brar in the next month.

## 2021-03-11 DIAGNOSIS — K21.9 GASTROESOPHAGEAL REFLUX DISEASE WITHOUT ESOPHAGITIS: Primary | ICD-10-CM

## 2021-03-13 RX ORDER — OMEPRAZOLE 40 MG/1
CAPSULE, DELAYED RELEASE ORAL
Qty: 90 CAP | Refills: 1 | Status: SHIPPED | OUTPATIENT
Start: 2021-03-13 | End: 2021-10-03

## 2021-03-16 ENCOUNTER — TELEPHONE (OUTPATIENT)
Dept: PRIMARY CARE CLINIC | Age: 81
End: 2021-03-16

## 2021-03-16 NOTE — TELEPHONE ENCOUNTER
Pt would like a call back to discuss getting the prolia injection . She has been scheduled for a fu with Dr Ally Camilo.  Please call

## 2021-03-17 DIAGNOSIS — I10 ESSENTIAL HYPERTENSION: ICD-10-CM

## 2021-03-17 NOTE — TELEPHONE ENCOUNTER
Requested Prescriptions     Pending Prescriptions Disp Refills    metoprolol succinate (TOPROL-XL) 25 mg XL tablet 90 Tab 0     Sig: Take 1 Tab by mouth daily.  Take two tablets daily   Pt only has 1 day left

## 2021-03-18 NOTE — TELEPHONE ENCOUNTER
Pt stated this RX she takes 2 tablets of the Metoprolol XL 25mg once daily. The RX sent to pharmacy has take one tablet daily and Take 2 tablets once daily. We called this in to [facility] the last time it was sent in and had the Pharmacist correct it to 2 tablets every day but it still has the same directions on it. Patient states they gave her #6 pills and she has 2 left for tomorrow.

## 2021-03-19 RX ORDER — METOPROLOL SUCCINATE 25 MG/1
25 TABLET, EXTENDED RELEASE ORAL 2 TIMES DAILY
Qty: 180 TAB | Refills: 1 | Status: SHIPPED | OUTPATIENT
Start: 2021-03-19 | End: 2021-10-03

## 2021-03-29 DIAGNOSIS — M81.0 AGE-RELATED OSTEOPOROSIS WITHOUT CURRENT PATHOLOGICAL FRACTURE: ICD-10-CM

## 2021-03-29 NOTE — PROGRESS NOTES
Informed patient of Bone Density results shows stability. To repeat in 2 years. Patient stated she stopped taking the bone medication at least one year ago. She wants to discuss the Prolia injection with you when she comes to her appointment on 04/06/2021.

## 2021-04-05 ENCOUNTER — APPOINTMENT (OUTPATIENT)
Dept: CT IMAGING | Age: 81
DRG: 552 | End: 2021-04-05
Attending: EMERGENCY MEDICINE
Payer: MEDICARE

## 2021-04-05 ENCOUNTER — APPOINTMENT (OUTPATIENT)
Dept: CT IMAGING | Age: 81
DRG: 552 | End: 2021-04-05
Attending: NURSE PRACTITIONER
Payer: MEDICARE

## 2021-04-05 ENCOUNTER — HOSPITAL ENCOUNTER (INPATIENT)
Age: 81
LOS: 3 days | Discharge: NURSING, REHAB OR LONG-TERM CARE | DRG: 552 | End: 2021-04-08
Attending: EMERGENCY MEDICINE | Admitting: HOSPITALIST
Payer: MEDICARE

## 2021-04-05 DIAGNOSIS — F41.9 ANXIETY: ICD-10-CM

## 2021-04-05 DIAGNOSIS — S32.018A OTHER CLOSED FRACTURE OF FIRST LUMBAR VERTEBRA, INITIAL ENCOUNTER (HCC): Primary | ICD-10-CM

## 2021-04-05 DIAGNOSIS — S32.010A CLOSED WEDGE COMPRESSION FRACTURE OF L1 VERTEBRA, INITIAL ENCOUNTER (HCC): ICD-10-CM

## 2021-04-05 PROBLEM — S32.019A FRACTURE OF L1 VERTEBRA (HCC): Status: ACTIVE | Noted: 2021-04-05

## 2021-04-05 PROBLEM — S32.019A L1 VERTEBRAL FRACTURE (HCC): Status: ACTIVE | Noted: 2021-04-05

## 2021-04-05 PROBLEM — R52 INTRACTABLE PAIN: Status: ACTIVE | Noted: 2021-04-05

## 2021-04-05 LAB
ALBUMIN SERPL-MCNC: 3.2 G/DL (ref 3.5–5)
ALBUMIN/GLOB SERPL: 0.9 {RATIO} (ref 1.1–2.2)
ALP SERPL-CCNC: 99 U/L (ref 45–117)
ALT SERPL-CCNC: 18 U/L (ref 12–78)
ANION GAP SERPL CALC-SCNC: 7 MMOL/L (ref 5–15)
AST SERPL W P-5'-P-CCNC: 26 U/L (ref 15–37)
BASOPHILS # BLD: 0 K/UL (ref 0–0.2)
BASOPHILS NFR BLD: 1 % (ref 0–2.5)
BILIRUB SERPL-MCNC: 0.4 MG/DL (ref 0.2–1)
BUN SERPL-MCNC: 15 MG/DL (ref 6–20)
BUN/CREAT SERPL: 17 (ref 12–20)
CA-I BLD-MCNC: 8.7 MG/DL (ref 8.5–10.1)
CHLORIDE SERPL-SCNC: 106 MMOL/L (ref 97–108)
CO2 SERPL-SCNC: 28 MMOL/L (ref 21–32)
CREAT SERPL-MCNC: 0.86 MG/DL (ref 0.55–1.02)
EOSINOPHIL # BLD: 0.1 K/UL (ref 0–0.7)
EOSINOPHIL NFR BLD: 1 % (ref 0.9–2.9)
ERYTHROCYTE [DISTWIDTH] IN BLOOD BY AUTOMATED COUNT: 14.2 % (ref 11.5–14.5)
GLOBULIN SER CALC-MCNC: 3.4 G/DL (ref 2–4)
GLUCOSE SERPL-MCNC: 97 MG/DL (ref 65–100)
HCT VFR BLD AUTO: 35.3 % (ref 36–46)
HGB BLD-MCNC: 11.5 G/DL (ref 13.5–17.5)
LYMPHOCYTES # BLD: 1 K/UL (ref 1–4.8)
LYMPHOCYTES NFR BLD: 14 % (ref 20.5–51.1)
MCH RBC QN AUTO: 29.2 PG (ref 31–34)
MCHC RBC AUTO-ENTMCNC: 32.7 G/DL (ref 31–36)
MCV RBC AUTO: 89.5 FL (ref 80–100)
MONOCYTES # BLD: 0.4 K/UL (ref 0.2–2.4)
MONOCYTES NFR BLD: 6 % (ref 1.7–9.3)
NEUTS SEG # BLD: 5.5 K/UL (ref 1.8–7.7)
NEUTS SEG NFR BLD: 78 % (ref 42–75)
NRBC # BLD: 0 K/UL
NRBC BLD-RTO: 0.1 PER 100 WBC
PLATELET # BLD AUTO: 163 K/UL (ref 150–400)
PMV BLD AUTO: 7.8 FL (ref 6.5–11.5)
POTASSIUM SERPL-SCNC: 4 MMOL/L (ref 3.5–5.1)
PROT SERPL-MCNC: 6.6 G/DL (ref 6.4–8.2)
RBC # BLD AUTO: 3.94 M/UL (ref 4.5–5.9)
SODIUM SERPL-SCNC: 141 MMOL/L (ref 136–145)
TROPONIN I SERPL-MCNC: <0.05 NG/ML
TROPONIN I SERPL-MCNC: <0.05 NG/ML
WBC # BLD AUTO: 7 K/UL (ref 4.4–11.3)

## 2021-04-05 PROCEDURE — 72131 CT LUMBAR SPINE W/O DYE: CPT

## 2021-04-05 PROCEDURE — 84484 ASSAY OF TROPONIN QUANT: CPT

## 2021-04-05 PROCEDURE — 96374 THER/PROPH/DIAG INJ IV PUSH: CPT

## 2021-04-05 PROCEDURE — 36415 COLL VENOUS BLD VENIPUNCTURE: CPT

## 2021-04-05 PROCEDURE — 80053 COMPREHEN METABOLIC PANEL: CPT

## 2021-04-05 PROCEDURE — 74011250637 HC RX REV CODE- 250/637: Performed by: NURSE PRACTITIONER

## 2021-04-05 PROCEDURE — 72128 CT CHEST SPINE W/O DYE: CPT

## 2021-04-05 PROCEDURE — 74011250636 HC RX REV CODE- 250/636: Performed by: HOSPITALIST

## 2021-04-05 PROCEDURE — 74176 CT ABD & PELVIS W/O CONTRAST: CPT

## 2021-04-05 PROCEDURE — 74011250636 HC RX REV CODE- 250/636: Performed by: EMERGENCY MEDICINE

## 2021-04-05 PROCEDURE — 72125 CT NECK SPINE W/O DYE: CPT

## 2021-04-05 PROCEDURE — 85025 COMPLETE CBC W/AUTO DIFF WBC: CPT

## 2021-04-05 PROCEDURE — 99284 EMERGENCY DEPT VISIT MOD MDM: CPT

## 2021-04-05 PROCEDURE — 70450 CT HEAD/BRAIN W/O DYE: CPT

## 2021-04-05 PROCEDURE — 74011250637 HC RX REV CODE- 250/637: Performed by: HOSPITALIST

## 2021-04-05 PROCEDURE — 74011000250 HC RX REV CODE- 250: Performed by: HOSPITALIST

## 2021-04-05 PROCEDURE — 65270000029 HC RM PRIVATE

## 2021-04-05 RX ORDER — METOPROLOL SUCCINATE 25 MG/1
25 TABLET, EXTENDED RELEASE ORAL 2 TIMES DAILY
Status: DISCONTINUED | OUTPATIENT
Start: 2021-04-05 | End: 2021-04-08 | Stop reason: HOSPADM

## 2021-04-05 RX ORDER — PANTOPRAZOLE SODIUM 40 MG/1
40 TABLET, DELAYED RELEASE ORAL DAILY
Status: DISCONTINUED | OUTPATIENT
Start: 2021-04-06 | End: 2021-04-08 | Stop reason: HOSPADM

## 2021-04-05 RX ORDER — ACETAMINOPHEN 325 MG/1
650 TABLET ORAL EVERY 6 HOURS
Status: DISCONTINUED | OUTPATIENT
Start: 2021-04-05 | End: 2021-04-07

## 2021-04-05 RX ORDER — TRAMADOL HYDROCHLORIDE 50 MG/1
50 TABLET ORAL
Status: DISCONTINUED | OUTPATIENT
Start: 2021-04-05 | End: 2021-04-06

## 2021-04-05 RX ORDER — ATORVASTATIN CALCIUM 40 MG/1
40 TABLET, FILM COATED ORAL DAILY
Status: DISCONTINUED | OUTPATIENT
Start: 2021-04-06 | End: 2021-04-08 | Stop reason: HOSPADM

## 2021-04-05 RX ORDER — METHOCARBAMOL 500 MG/1
500 TABLET, FILM COATED ORAL 3 TIMES DAILY
Status: DISCONTINUED | OUTPATIENT
Start: 2021-04-05 | End: 2021-04-06

## 2021-04-05 RX ORDER — ENOXAPARIN SODIUM 100 MG/ML
40 INJECTION SUBCUTANEOUS EVERY 12 HOURS
Status: DISCONTINUED | OUTPATIENT
Start: 2021-04-05 | End: 2021-04-08 | Stop reason: HOSPADM

## 2021-04-05 RX ORDER — SODIUM CHLORIDE 0.9 % (FLUSH) 0.9 %
5-40 SYRINGE (ML) INJECTION EVERY 8 HOURS
Status: DISCONTINUED | OUTPATIENT
Start: 2021-04-05 | End: 2021-04-08 | Stop reason: HOSPADM

## 2021-04-05 RX ORDER — POLYETHYLENE GLYCOL 3350 17 G/17G
17 POWDER, FOR SOLUTION ORAL DAILY PRN
Status: DISCONTINUED | OUTPATIENT
Start: 2021-04-05 | End: 2021-04-08 | Stop reason: HOSPADM

## 2021-04-05 RX ORDER — LIDOCAINE 4 G/100G
2 PATCH TOPICAL EVERY 12 HOURS
Status: DISCONTINUED | OUTPATIENT
Start: 2021-04-05 | End: 2021-04-06

## 2021-04-05 RX ORDER — ACETAMINOPHEN 325 MG/1
650 TABLET ORAL
Status: DISCONTINUED | OUTPATIENT
Start: 2021-04-05 | End: 2021-04-05

## 2021-04-05 RX ORDER — ALBUTEROL SULFATE 90 UG/1
2 AEROSOL, METERED RESPIRATORY (INHALATION)
Status: DISCONTINUED | OUTPATIENT
Start: 2021-04-05 | End: 2021-04-06

## 2021-04-05 RX ORDER — ACETAMINOPHEN 650 MG/1
650 SUPPOSITORY RECTAL
Status: DISCONTINUED | OUTPATIENT
Start: 2021-04-05 | End: 2021-04-05

## 2021-04-05 RX ORDER — MORPHINE SULFATE 4 MG/ML
4 INJECTION, SOLUTION INTRAMUSCULAR; INTRAVENOUS ONCE
Status: COMPLETED | OUTPATIENT
Start: 2021-04-05 | End: 2021-04-05

## 2021-04-05 RX ORDER — ONDANSETRON 2 MG/ML
4 INJECTION INTRAMUSCULAR; INTRAVENOUS
Status: DISCONTINUED | OUTPATIENT
Start: 2021-04-05 | End: 2021-04-08 | Stop reason: HOSPADM

## 2021-04-05 RX ORDER — ENOXAPARIN SODIUM 100 MG/ML
40 INJECTION SUBCUTANEOUS DAILY
Status: DISCONTINUED | OUTPATIENT
Start: 2021-04-06 | End: 2021-04-05

## 2021-04-05 RX ORDER — SODIUM CHLORIDE 0.9 % (FLUSH) 0.9 %
5-40 SYRINGE (ML) INJECTION AS NEEDED
Status: DISCONTINUED | OUTPATIENT
Start: 2021-04-05 | End: 2021-04-08 | Stop reason: HOSPADM

## 2021-04-05 RX ORDER — AMLODIPINE BESYLATE 2.5 MG/1
2.5 TABLET ORAL DAILY
Status: DISCONTINUED | OUTPATIENT
Start: 2021-04-06 | End: 2021-04-07

## 2021-04-05 RX ORDER — PROMETHAZINE HYDROCHLORIDE 25 MG/1
12.5 TABLET ORAL
Status: DISCONTINUED | OUTPATIENT
Start: 2021-04-05 | End: 2021-04-08 | Stop reason: HOSPADM

## 2021-04-05 RX ORDER — HYDRALAZINE HYDROCHLORIDE 20 MG/ML
10 INJECTION INTRAMUSCULAR; INTRAVENOUS
Status: DISCONTINUED | OUTPATIENT
Start: 2021-04-05 | End: 2021-04-08 | Stop reason: HOSPADM

## 2021-04-05 RX ORDER — MORPHINE SULFATE 2 MG/ML
2 INJECTION, SOLUTION INTRAMUSCULAR; INTRAVENOUS
Status: DISCONTINUED | OUTPATIENT
Start: 2021-04-05 | End: 2021-04-07

## 2021-04-05 RX ADMIN — Medication 10 ML: at 22:22

## 2021-04-05 RX ADMIN — MORPHINE SULFATE 2 MG: 2 INJECTION, SOLUTION INTRAMUSCULAR; INTRAVENOUS at 15:13

## 2021-04-05 RX ADMIN — MORPHINE SULFATE 2 MG: 2 INJECTION, SOLUTION INTRAMUSCULAR; INTRAVENOUS at 20:19

## 2021-04-05 RX ADMIN — MORPHINE SULFATE 4 MG: 4 INJECTION, SOLUTION INTRAMUSCULAR; INTRAVENOUS at 12:46

## 2021-04-05 RX ADMIN — ONDANSETRON 4 MG: 2 INJECTION INTRAMUSCULAR; INTRAVENOUS at 20:19

## 2021-04-05 RX ADMIN — ACETAMINOPHEN 650 MG: 325 TABLET ORAL at 23:49

## 2021-04-05 RX ADMIN — Medication 10 ML: at 16:30

## 2021-04-05 RX ADMIN — METOPROLOL SUCCINATE 25 MG: 25 TABLET, FILM COATED, EXTENDED RELEASE ORAL at 21:24

## 2021-04-05 RX ADMIN — METHOCARBAMOL TABLETS 500 MG: 500 TABLET, COATED ORAL at 22:22

## 2021-04-05 NOTE — H&P
History and Physical    Patient: Celeste Feliz MRN: [id number]  SSN: [id number]    YOB: 1940  Age: [de-identified] y.o. Sex: female      Subjective:      Celeste Feliz is a [de-identified] y.o. female with PMH of Anemia, Anxiety, Breast CA s/p lumpectomy, Depression, GERD, COPD, HLD, HTN, CVA, and Mild Mitral Regurgitation who presented to ED this morning after GLF @ home. Patient reports was standing up in living room when she lost balance and fell backwards/sideways into a wooden rack and then fell to floor striking her left temple on the way down. Immediately had severe back pain and mild headache. Called EMS and was transported to ED for further workup and treatment. Pain remained intractable in ED and CT ABD/PELV found L1 fracture. Dr. Payam Humphrey was consulted and recommendations given and patient accepted by [facility] team for treatment of intractable pain, bracing, and PT referral.  Upon my exam patient complains of mild headache, and moderate lower back pain. She reports that she had dizziness after fall but no other focal deficits. She is neurovascularly intact in BLE.       Past Medical History:   Diagnosis Date    Anemia 6/24/2020    Anxiety 6/24/2020    Arthritis 6/24/2020    Breast cancer ([address] 6/24/2020    Depression 6/24/2020    GERD (gastroesophageal reflux disease) 6/24/2020    Heart disease 6/24/2020    Hyperlipidemia 6/24/2020    Hypertension 6/24/2020    Pulmonary disease 6/24/2020    Sleep disorder 6/24/2020    Stroke (cerebrum) ([address] 6/24/2020 2012     Past Surgical History:   Procedure Laterality Date    HX BREAST LUMPECTOMY  1996    HX TUBAL LIGATION      MI REVISION AMBROSE-IMPLANT CAPSULE BREAST        Family History   Problem Relation Age of Onset    Hypertension Mother     Hypertension Sister     Alzheimer Sister     Cancer Brother      Social History     Tobacco Use    Smoking status: Former Smoker    Smokeless tobacco: Never Used   Substance Use Topics    Alcohol use: Not Currently      Prior to Admission medications    Medication Sig Start Date End Date Taking? Authorizing Provider   metoprolol succinate (TOPROL-XL) 25 mg XL tablet Take 1 Tab by mouth two (2) times a day. Take two tablets daily 3/19/21   Christiano Mckinnon MD   omeprazole (PRILOSEC) 40 mg capsule TAKE 1 CAPSULE BY MOUTH EVERY DAY 3/13/21   Christiano Mckinnon MD   atorvastatin (LIPITOR) 40 mg tablet Take 1 Tab by mouth daily. 3/8/21   Christiano Mckinnon MD   melatonin 3 mg tablet Take 3 mg by mouth nightly as needed for Insomnia. Provider, Historical   cholecalciferol (Vitamin D3) 25 mcg (1,000 unit) cap Take 1,000 Units by mouth daily. Provider, Historical   albuterol (PROVENTIL HFA, VENTOLIN HFA, PROAIR HFA) 90 mcg/actuation inhaler Take 2 Puffs by inhalation every six (6) hours as needed for Wheezing. 11/5/20   Christiano Mckinnon MD   alendronate (FOSAMAX) 35 mg tablet TAKE 1 TABLET WEEKLY ON AN EMPTY STOMACH 30-60 MIN BEFORE BREAKFAST 8/22/20   Christiano Mckinnon MD   amLODIPine (NORVASC) 2.5 mg tablet Take  by mouth daily. Provider, Historical   aspirin delayed-release 81 mg tablet Take  by mouth daily. Provider, Historical        Allergies   Allergen Reactions    Incruse Ellipta [Umeclidinium] Other (comments)     Throat felt funny and red on the outside on neck area.  Pcn [Penicillins] Unknown (comments)     States was in hospital a long time ago (in her 19's). Does'nt remember reaction she had. Review of Systems:  Review of Systems   Constitutional: Negative. HENT: Negative. Eyes: Negative. Respiratory: Negative. Cardiovascular: Negative. Gastrointestinal: Negative. Genitourinary: Negative. Musculoskeletal: Positive for back pain, falls and neck pain. Skin: Negative. Neurological: Positive for dizziness and headaches. Negative for speech change, focal weakness, seizures and loss of consciousness. Endo/Heme/Allergies: Negative.     Psychiatric/Behavioral: Negative. Objective:     Vitals:    04/05/21 1116 04/05/21 1303 04/05/21 1431 04/05/21 1600   BP:  (!) 157/75 (!) 154/88    Pulse:  86 88 88   Resp:  18 19    Temp:   98.6 °F (37 °C)    SpO2: 96% 96% 97%    Weight:       Height:            Recent Results (from the past 24 hour(s))   CBC WITH AUTOMATED DIFF    Collection Time: 04/05/21 12:50 PM   Result Value Ref Range    WBC 7.0 4.4 - 11.3 K/uL    RBC 3.94 (L) 4.50 - 5.90 M/uL    HGB 11.5 (L) 13.5 - 17.5 g/dL    HCT 35.3 (L) 36 - 46 %    MCV 89.5 80 - 100 FL    MCH 29.2 (L) 31 - 34 PG    MCHC 32.7 31.0 - 36.0 g/dL    RDW 14.2 11.5 - 14.5 %    PLATELET 271 079 - 043 K/uL    MPV 7.8 6.5 - 11.5 FL    NRBC 0.1  WBC    ABSOLUTE NRBC 0.00 K/uL    NEUTROPHILS 78 (H) 42 - 75 %    LYMPHOCYTES 14 (L) 20.5 - 51.1 %    MONOCYTES 6 1.7 - 9.3 %    EOSINOPHILS 1 0.9 - 2.9 %    BASOPHILS 1 0.0 - 2.5 %    ABS. NEUTROPHILS 5.5 1.8 - 7.7 K/UL    ABS. LYMPHOCYTES 1.0 1.0 - 4.8 K/UL    ABS. MONOCYTES 0.4 0.2 - 2.4 K/UL    ABS. EOSINOPHILS 0.1 0.0 - 0.7 K/UL    ABS. BASOPHILS 0.0 0.0 - 0.2 K/UL   METABOLIC PANEL, COMPREHENSIVE    Collection Time: 04/05/21 12:50 PM   Result Value Ref Range    Sodium 141 136 - 145 mmol/L    Potassium 4.0 3.5 - 5.1 mmol/L    Chloride 106 97 - 108 mmol/L    CO2 28 21 - 32 mmol/L    Anion gap 7 5 - 15 mmol/L    Glucose 97 65 - 100 mg/dL    BUN 15 6 - 20 mg/dL    Creatinine 0.86 0.55 - 1.02 mg/dL    BUN/Creatinine ratio 17 12 - 20      GFR est AA >60 >60 ml/min/1.73m2    GFR est non-AA >60 >60 ml/min/1.73m2    Calcium 8.7 8.5 - 10.1 mg/dL    Bilirubin, total 0.4 0.2 - 1.0 mg/dL    AST (SGOT) 26 15 - 37 U/L    ALT (SGPT) 18 12 - 78 U/L    Alk.  phosphatase 99 45 - 117 U/L    Protein, total 6.6 6.4 - 8.2 g/dL    Albumin 3.2 (L) 3.5 - 5.0 g/dL    Globulin 3.4 2.0 - 4.0 g/dL    A-G Ratio 0.9 (L) 1.1 - 2.2     TROPONIN I    Collection Time: 04/05/21 12:50 PM   Result Value Ref Range    Troponin-I, Qt. <0.05 <0.05 ng/mL       XR Results (maximum last 3):  No results found for this or any previous visit. CT Results (maximum last 3): Results from [place] encounter on 04/05/21   CT HEAD WO CONT    Impression Age-appropriate atrophy. No acute findings. CT ABD PELV WO CONT    Impression Fracture of L1 with minimal loss of height. Colonic diverticulosis  without acute diverticulitis. Other findings as above. Physical Exam:  General: Well nourished elderly female lying supine in bed for spinal precautions, NAD, A&O  HEENT: Normocephalic, PERRLA, no drainage  Neck: Supple, Trachea midline, No JVD, mild tenderness c-spine that patient attributes to previous fall  RESP: CTA bilaterally with symmetrical chest movement. No SOB or distress. On RA  Cardiovascular: RRR no MRG  PVS: No rubor, cyanosis, no edema, Radial, DP, PT pulses equal bilaterally  ABD: obese , soft, NT, Normoactive BS  Derm: Warm/Dry/Intact with no lesions, normal turgor  Neuro: A&O PPTS, cranial nerves II- XII grossly intact via interaction with patient. No focal deficits  PSYCH: No anxiety or agitation      Assessment:     Hospital Problems  Date Reviewed: 2/15/2021          Codes Class Noted POA    Fracture of L1 vertebra ([facility] ICD-10-CM: I38.118J  ICD-9-CM: 805.4  4/5/2021 Unknown        Intractable pain ICD-10-CM: R52  ICD-9-CM: 780.96  4/5/2021 Unknown        L1 vertebral fracture ([facility] ICD-10-CM: Y88.836Y  ICD-9-CM: 805.4  4/5/2021 Unknown              Plan:     1. Admit to inpatient for intractable pain  2. Diet: NPO until spine cleared then cardiac diet  3. Activity: Supine position until spine cleared and orthotic device placed  4. Code Status: Full Code  5. Designated that kathi Shahid ([phone] and Vikas Martínez -[phone]) would make decisions for Forrestine Haste if she would become unable to.  6. DVT PPX: Lovenox BID given acute Trauma  7. Tobacco Use: Nonsmoker  8.  L1 fracture -  Mid-portion Vertebral Body transverse fracture:  Dr. Felicitas Hernandez (ortho) has been consulted. Patient to remain in supine positioning until spinal images are complete and appropriate positioning recommendations received. Continue pain regimen of Lidocaine patches to effected area and prn Morphine/Tramadol. Will add scheduled Tylenol and Robaxin. These can be up-titrated as necessary for pain relief. PT consulted placed. 9. Head Trauma - given patient reports of hitting head on Coffee table on way down and associated dizziness/headache after fall will obtain Head CT to r/o intracranial pathology and also obtain CT C-Spine for further evaluation. Plan as above otherwise. May consider ST Consult for Neurocognitive Rehab Post-Concussive Therapy. Decrease screen time and stimuli for now. 10. HTN - Continue patient home medication regimen when she is able to safely take PO medications. In mean time can have prn IV Lopressor and Hydralazine. 11. HLD - continue home statin when PO safe. 12. GERD - continue home PPI therapy.      Total time spent in consultation and coordination of care - 60 mins     Signed By: Marco Antonio Waddell NP     April 5, 2021

## 2021-04-05 NOTE — ED TRIAGE NOTES
Pt arrived with EMS with complaint of mechanical fall at home this am. Pt states \"left lower back pain. \" Pt denies LOC, denies use of blood thinners. Pt reports I might have hit my face pt denies any vision changes, denies head pain, denies neck pain. Pt responding to questions appropriately.

## 2021-04-05 NOTE — Clinical Note
Status[de-identified] INPATIENT [101]   Type of Bed: Medical [8]   Inpatient Hospitalization Certified Necessary for the Following Reasons: 9.  Other (further clarification in H&P documentation)   Admitting Diagnosis: Fracture of L1 vertebra [facility]) [[id number]]   Admitting Physician: Shawn Hendrix   Attending Physician: Will Olvera [[id number]]   Estimated Length of Stay: 2 Midnights   Discharge Plan[de-identified] [facility]

## 2021-04-05 NOTE — PROGRESS NOTES
Consulted by ER physician for potential admission for status post fall and CT showing L1 fracture with minimal loss of height. Patient continues to have intractable pain and was given 4 mg of IV morphine x1. Did discuss patient's fracture with physical therapy and it is preferred to have Ortho to evaluate patient and give clearance on physical therapy as well as to give recommendations.   If orthopedics can give recommendations and patient can be admitted here for pain control and physical therapy evaluation and treatment

## 2021-04-05 NOTE — PROGRESS NOTES
Problem: Falls - Risk of  Goal: *Absence of Falls  Description: Document Petefrancisco javier Garrick Fall Risk and appropriate interventions in the flowsheet. Outcome: Progressing Towards Goal  Note: Fall Risk Interventions:                                Problem: Patient Education: Go to Patient Education Activity  Goal: Patient/Family Education  Outcome: Progressing Towards Goal     Problem: Pressure Injury - Risk of  Goal: *Prevention of pressure injury  Description: Document Jordan Scale and appropriate interventions in the flowsheet. Outcome: Progressing Towards Goal  Note: Pressure Injury Interventions:             Activity Interventions: PT/OT evaluation    Mobility Interventions: PT/OT evaluation    Nutrition Interventions: Document food/fluid/supplement intake                     Problem: Patient Education: Go to Patient Education Activity  Goal: Patient/Family Education  Outcome: Progressing Towards Goal     Problem: Patient Education: Go to Patient Education Activity  Goal: Patient/Family Education  Outcome: Progressing Towards Goal

## 2021-04-05 NOTE — ED PROVIDER NOTES
HPI   Patient summoned EMS after sustaining a mechanical fall, landing on her back resulting in severe pain and inability to ambulate. She reports that she was trying to unplug an electrical device, became twisted and fell onto a wooden table and on the floor. She complains of sharp and severe left low back pain. She also reports striking her head but denies any pain or loss of consciousness. Denies cervical, thoracic, abdominal, and extremity pain. She reports that she has been at her baseline over the last few days and has felt quite well.   Past Medical History:   Diagnosis Date    Anemia 6/24/2020    Anxiety 6/24/2020    Arthritis 6/24/2020    Breast cancer ([facility] [address] 6/24/2020    Depression 6/24/2020    GERD (gastroesophageal reflux disease) 6/24/2020    Heart disease 6/24/2020    Hyperlipidemia 6/24/2020    Hypertension 6/24/2020    Pulmonary disease 6/24/2020    Sleep disorder 6/24/2020    Stroke (cerebrum) ([facility] [address] 6/24/2020 2012       Past Surgical History:   Procedure Laterality Date    HX BREAST LUMPECTOMY  1996    HX TUBAL LIGATION      MT REVISION AMBROSE-IMPLANT CAPSULE BREAST           Family History:   Problem Relation Age of Onset    Hypertension Mother     Hypertension Sister     Alzheimer Sister     Cancer Brother        Social History     Socioeconomic History    Marital status:      Spouse name: Not on file    Number of children: 3    Years of education: Not on file    Highest education level: High school graduate   Occupational History    Not on file   Social Needs    Financial resource strain: Not on file    Food insecurity     Worry: Never true     Inability: Never true    Transportation needs     Medical: No     Non-medical: No   Tobacco Use    Smoking status: Former Smoker    Smokeless tobacco: Never Used   Substance and Sexual Activity    Alcohol use: Not Currently    Drug use: Never    Sexual activity: Not Currently   Lifestyle    Physical activity Days per week: Not on file     Minutes per session: Not on file    Stress: Not on file   Relationships    Social connections     Talks on phone: Not on file     Gets together: Not on file     Attends Sikhism service: Not on file     Active member of club or organization: Not on file     Attends meetings of clubs or organizations: Not on file     Relationship status: Not on file    Intimate partner violence     Fear of current or ex partner: Not on file     Emotionally abused: Not on file     Physically abused: Not on file     Forced sexual activity: Not on file   Other Topics Concern     Service Not Asked    Blood Transfusions Not Asked    Caffeine Concern Not Asked    Occupational Exposure Not Asked   Bernett Catena Hazards Not Asked    Sleep Concern Not Asked    Stress Concern Not Asked    Weight Concern Not Asked    Special Diet Not Asked    Back Care Not Asked    Exercise Not Asked    Bike Helmet Not Asked   [address] Not Asked    Self-Exams Not Asked   Social History Narrative    Not on file         ALLERGIES: Incruse ellipta [umeclidinium] and Pcn [penicillins]    Review of Systems   Constitutional: Negative. HENT: Negative. Eyes: Negative. Respiratory: Negative. Cardiovascular: Negative. Gastrointestinal: Negative. Endocrine: Negative. Genitourinary: Negative. Musculoskeletal: Negative. Allergic/Immunologic: Negative. Neurological: Negative. Hematological: Negative. Psychiatric/Behavioral: Negative. All other systems reviewed and are negative. Vitals:    04/05/21 1109   BP: (!) 154/77   Pulse: 92   Resp: 18   SpO2: 95%   Weight: 89.8 kg (198 lb)   Height: 5' 8\" (1.727 m)            Physical Exam  Vitals signs and nursing note reviewed. Constitutional:       General: She is not in acute distress. Appearance: Normal appearance. She is obese. She is not ill-appearing, toxic-appearing or diaphoretic.    HENT:      Head: Normocephalic and atraumatic. Nose: Nose normal.      Mouth/Throat:      Mouth: Mucous membranes are moist.   Eyes:      Extraocular Movements: Extraocular movements intact. Pupils: Pupils are equal, round, and reactive to light. Neck:      Musculoskeletal: Normal range of motion and neck supple. No neck rigidity or muscular tenderness. Pulmonary:      Effort: Pulmonary effort is normal. No respiratory distress. Breath sounds: Normal breath sounds. Chest:      Chest wall: No tenderness. Abdominal:      General: Abdomen is flat. There is no distension. Palpations: Abdomen is soft. There is no mass. Tenderness: There is no abdominal tenderness. There is no guarding or rebound. Hernia: No hernia is present. Musculoskeletal: Normal range of motion. General: No swelling, tenderness, deformity or signs of injury. Right lower leg: No edema. Left lower leg: No edema. Skin:     General: Skin is warm and dry. Neurological:      General: No focal deficit present. Mental Status: She is alert and oriented to person, place, and time. Mental status is at baseline. Cranial Nerves: No cranial nerve deficit. Sensory: No sensory deficit. Motor: No weakness. Coordination: Coordination normal.   Psychiatric:         Mood and Affect: Mood normal.         Behavior: Behavior normal.          MDM     Patient experienced a fair amount of pain during transfer from the EMS stretcher to the ER bed, however upon transfer and laying flat, she reports the pain is resolved. I cannot recreate any pain or tenderness on exam.  Therefore, based on the mechanism and exam, I do not suspect an internal injury. Will check CT for possible lumbar or pelvic fracture. Disposition based on results. L1 fracture noted on CT. Patient is now in a fair amount of pain. Patient's daughter-in-law is now present reports that patient lives alone.   Based on all this, will admit for pain control and possible Ortho versus neurosurgery evaluation for spinal fracture. .    Discussed case with hospitalist who consulted physical therapy. They apparently requested Ortho consult. Will work on obtaining this.   Procedures

## 2021-04-06 LAB — TROPONIN I SERPL-MCNC: <0.05 NG/ML

## 2021-04-06 PROCEDURE — 97161 PT EVAL LOW COMPLEX 20 MIN: CPT

## 2021-04-06 PROCEDURE — 36415 COLL VENOUS BLD VENIPUNCTURE: CPT

## 2021-04-06 PROCEDURE — 92610 EVALUATE SWALLOWING FUNCTION: CPT

## 2021-04-06 PROCEDURE — 74011000250 HC RX REV CODE- 250: Performed by: HOSPITALIST

## 2021-04-06 PROCEDURE — 84484 ASSAY OF TROPONIN QUANT: CPT

## 2021-04-06 PROCEDURE — 74011250637 HC RX REV CODE- 250/637: Performed by: NURSE PRACTITIONER

## 2021-04-06 PROCEDURE — 65270000029 HC RM PRIVATE

## 2021-04-06 PROCEDURE — 74011250637 HC RX REV CODE- 250/637: Performed by: HOSPITALIST

## 2021-04-06 PROCEDURE — 74011250636 HC RX REV CODE- 250/636: Performed by: NURSE PRACTITIONER

## 2021-04-06 PROCEDURE — 74011250636 HC RX REV CODE- 250/636: Performed by: HOSPITALIST

## 2021-04-06 RX ORDER — METHOCARBAMOL 500 MG/1
750 TABLET, FILM COATED ORAL 3 TIMES DAILY
Status: DISCONTINUED | OUTPATIENT
Start: 2021-04-06 | End: 2021-04-07

## 2021-04-06 RX ORDER — FENTANYL 25 UG/1
1 PATCH TRANSDERMAL
Status: DISCONTINUED | OUTPATIENT
Start: 2021-04-06 | End: 2021-04-08 | Stop reason: HOSPADM

## 2021-04-06 RX ORDER — KETOROLAC TROMETHAMINE 30 MG/ML
15 INJECTION, SOLUTION INTRAMUSCULAR; INTRAVENOUS EVERY 6 HOURS
Status: DISCONTINUED | OUTPATIENT
Start: 2021-04-06 | End: 2021-04-06

## 2021-04-06 RX ORDER — CALCIUM CARB/MAGNESIUM CARB 311-232MG
5 TABLET ORAL
Status: DISCONTINUED | OUTPATIENT
Start: 2021-04-06 | End: 2021-04-08 | Stop reason: HOSPADM

## 2021-04-06 RX ORDER — OXYCODONE HYDROCHLORIDE 5 MG/1
2.5 TABLET ORAL EVERY 6 HOURS
Status: DISCONTINUED | OUTPATIENT
Start: 2021-04-06 | End: 2021-04-06

## 2021-04-06 RX ORDER — SIMETHICONE 80 MG
80 TABLET,CHEWABLE ORAL
Status: DISCONTINUED | OUTPATIENT
Start: 2021-04-06 | End: 2021-04-08 | Stop reason: HOSPADM

## 2021-04-06 RX ORDER — ALBUTEROL SULFATE 0.83 MG/ML
2.5 SOLUTION RESPIRATORY (INHALATION)
Status: DISCONTINUED | OUTPATIENT
Start: 2021-04-06 | End: 2021-04-08 | Stop reason: HOSPADM

## 2021-04-06 RX ORDER — OXYCODONE HYDROCHLORIDE 5 MG/1
5 TABLET ORAL EVERY 6 HOURS
Status: DISCONTINUED | OUTPATIENT
Start: 2021-04-06 | End: 2021-04-07

## 2021-04-06 RX ORDER — DEXTROMETHORPHAN/PSEUDOEPHED 2.5-7.5/.8
20 DROPS ORAL
Status: DISCONTINUED | OUTPATIENT
Start: 2021-04-06 | End: 2021-04-06

## 2021-04-06 RX ADMIN — Medication 10 ML: at 05:55

## 2021-04-06 RX ADMIN — FAMOTIDINE 20 MG: 10 INJECTION, SOLUTION INTRAVENOUS at 09:58

## 2021-04-06 RX ADMIN — OXYCODONE 5 MG: 5 TABLET ORAL at 23:27

## 2021-04-06 RX ADMIN — SIMETHICONE 80 MG: 80 TABLET, CHEWABLE ORAL at 21:39

## 2021-04-06 RX ADMIN — ONDANSETRON 4 MG: 2 INJECTION INTRAMUSCULAR; INTRAVENOUS at 06:19

## 2021-04-06 RX ADMIN — Medication 5 MG: at 21:11

## 2021-04-06 RX ADMIN — ATORVASTATIN CALCIUM 40 MG: 40 TABLET, FILM COATED ORAL at 09:59

## 2021-04-06 RX ADMIN — MORPHINE SULFATE 2 MG: 2 INJECTION, SOLUTION INTRAMUSCULAR; INTRAVENOUS at 03:23

## 2021-04-06 RX ADMIN — ENOXAPARIN SODIUM 40 MG: 40 INJECTION SUBCUTANEOUS at 05:33

## 2021-04-06 RX ADMIN — OXYCODONE 5 MG: 5 TABLET ORAL at 18:38

## 2021-04-06 RX ADMIN — ACETAMINOPHEN 650 MG: 325 TABLET ORAL at 17:37

## 2021-04-06 RX ADMIN — ACETAMINOPHEN 650 MG: 325 TABLET ORAL at 11:24

## 2021-04-06 RX ADMIN — METHOCARBAMOL TABLETS 500 MG: 500 TABLET, COATED ORAL at 09:59

## 2021-04-06 RX ADMIN — Medication 10 ML: at 14:13

## 2021-04-06 RX ADMIN — ONDANSETRON 4 MG: 2 INJECTION INTRAMUSCULAR; INTRAVENOUS at 20:25

## 2021-04-06 RX ADMIN — ACETAMINOPHEN 650 MG: 325 TABLET ORAL at 23:27

## 2021-04-06 RX ADMIN — FAMOTIDINE 20 MG: 10 INJECTION, SOLUTION INTRAVENOUS at 20:25

## 2021-04-06 RX ADMIN — ENOXAPARIN SODIUM 40 MG: 40 INJECTION SUBCUTANEOUS at 17:36

## 2021-04-06 RX ADMIN — MORPHINE SULFATE 2 MG: 2 INJECTION, SOLUTION INTRAMUSCULAR; INTRAVENOUS at 00:01

## 2021-04-06 RX ADMIN — METHOCARBAMOL TABLETS 750 MG: 500 TABLET, COATED ORAL at 15:00

## 2021-04-06 RX ADMIN — MORPHINE SULFATE 2 MG: 2 INJECTION, SOLUTION INTRAMUSCULAR; INTRAVENOUS at 06:29

## 2021-04-06 RX ADMIN — AMLODIPINE BESYLATE 2.5 MG: 2.5 TABLET ORAL at 09:59

## 2021-04-06 RX ADMIN — MORPHINE SULFATE 2 MG: 2 INJECTION, SOLUTION INTRAMUSCULAR; INTRAVENOUS at 14:52

## 2021-04-06 RX ADMIN — OXYCODONE HYDROCHLORIDE 2.5 MG: 5 TABLET ORAL at 11:24

## 2021-04-06 RX ADMIN — Medication 10 ML: at 21:11

## 2021-04-06 RX ADMIN — METOPROLOL SUCCINATE 25 MG: 25 TABLET, FILM COATED, EXTENDED RELEASE ORAL at 10:31

## 2021-04-06 RX ADMIN — METOPROLOL SUCCINATE 25 MG: 25 TABLET, FILM COATED, EXTENDED RELEASE ORAL at 20:25

## 2021-04-06 RX ADMIN — METHOCARBAMOL TABLETS 750 MG: 500 TABLET, COATED ORAL at 21:11

## 2021-04-06 NOTE — PROGRESS NOTES
DPT attempted PT at 10:32 am. Pt declined due to high pain levels (10/10) in back, head, and stomach. Will attempt again later this afternoon 4/6/2021.

## 2021-04-06 NOTE — ROUTINE PROCESS
The pt is awake. She is c/o of having a headache along with her back pain. The pt c/o of being nauseated & she did vomit approximately 600cc of vomitus. She also expelled a lge amt of flatus. She was given zofran 4mg Ivp.

## 2021-04-06 NOTE — PROGRESS NOTES
Progress Note  Date:4/6/2021       [facility]:616/35  Patient Name:Genny Elizabeth     YOB: 1940     Age:80 y.o. Subjective    HPI:  Sy King is a [de-identified] y.o. female with PMH of Anemia, Anxiety, Breast CA s/p lumpectomy, Depression, GERD, COPD, HLD, HTN, CVA, and Mild Mitral Regurgitation who presented to ED this morning after GLF @ home. Patient reports was standing up in living room when she lost balance and fell backwards/sideways into a wooden rack and then fell to floor striking her left temple on the way down. Immediately had severe back pain and mild headache. Called EMS and was transported to ED for further workup and treatment. Pain remained intractable in ED and CT ABD/PELV found L1 fracture. Dr. Maryjane Rubinstein was consulted and recommendations given and patient accepted by [facility] team for treatment of intractable pain, bracing, and PT referral.  Upon my exam patient complains of mild headache, and moderate lower back pain. She reports that she had dizziness after fall but no other focal deficits. She is neurovascularly intact in BLE    Patient seen and evaluated resting in bed in mild to moderate distress has had nausea all night and episode of vomiting this morning. Continues to have pain even with current pain medications. With minimal movement patient has pain the discussed with orthopedics Dr. Maryjane Rubinstein and no special recommendations given will still attempting to obtain lumbar brace for comfort. Adjusted pain medications and will monitor closely    Review of Systems   Constitutional: Negative. HENT: Negative. Eyes: Negative. Respiratory: Negative. Cardiovascular: Negative. Gastrointestinal: Positive for abdominal pain, nausea and vomiting. Endocrine: Negative. Genitourinary: Negative. Musculoskeletal: Positive for back pain. Skin: Negative. Allergic/Immunologic: Negative. Neurological: Negative. Hematological: Negative. Psychiatric/Behavioral: Negative. Objective           Vitals Last 24 Hours:  Patient Vitals for the past 24 hrs:   Temp Pulse Resp BP SpO2   04/06/21 1200 -- 89 -- -- --   04/06/21 1130 97.1 °F (36.2 °C) 84 19 (!) 153/91 --   04/06/21 0800 97.8 °F (36.6 °C) 87 20 (!) 148/69 (!) 87 %   04/06/21 0410 98.3 °F (36.8 °C) 88 20 (!) 149/74 93 %   04/06/21 0015 98.7 °F (37.1 °C) 93 20 (!) 152/80 92 %   04/05/21 2000 98.5 °F (36.9 °C) 85 20 (!) 159/75 94 %   04/05/21 1600 -- 88 -- -- --   04/05/21 1431 98.6 °F (37 °C) 88 19 (!) 154/88 97 %        I/O (24Hr): Intake/Output Summary (Last 24 hours) at 4/6/2021 1320  Last data filed at 4/6/2021 1130  Gross per 24 hour   Intake 455 ml   Output 600 ml   Net -145 ml       Physical Exam:  General: Alert, cooperative, no distress, appears stated age. Head:  Normocephalic, without obvious abnormality, atraumatic. Eyes:  Conjunctivae/corneas clear. Pupils equal, round, reactive to light. Extraocular movements intact. Lungs:  Clear to auscultation bilaterally. no wheeze, rales, crackles, rhonchi   Chest wall: No tenderness or deformity. Heart:  Regular rate and rhythm, S1, S2 normal, no murmur, click, rub or gallop. Abdomen:  Soft, non-tender. Bowel sounds normal. No masses,  No organomegaly. Extremities: Extremities normal, atraumatic, no cyanosis or edema. Pulses: 2+ and symmetric all extremities. Skin: Skin color, texture, turgor normal. No rashes or lesions  Neurologic: Awake, Alert, oriented.  No obvious gross sensory or motor deficits      Medications           Current Facility-Administered Medications   Medication Dose Route Frequency    albuterol (PROVENTIL VENTOLIN) nebulizer solution 2.5 mg  2.5 mg Nebulization Q6H PRN    famotidine (PF) (PEPCID) 20 mg in 0.9% sodium chloride 10 mL injection  20 mg IntraVENous Q12H    fentaNYL (DURAGESIC) 25 mcg/hr patch 1 Patch  1 Patch TransDERmal Q72H    melatonin (rapid dissolve) tablet 5 mg  5 mg Oral QHS    methocarbamoL (ROBAXIN) tablet 750 mg  750 mg Oral TID    oxyCODONE IR (ROXICODONE) tablet 2.5 mg  2.5 mg Oral Q6H    sodium chloride (NS) flush 5-40 mL  5-40 mL IntraVENous Q8H    sodium chloride (NS) flush 5-40 mL  5-40 mL IntraVENous PRN    polyethylene glycol (MIRALAX) packet 17 g  17 g Oral DAILY PRN    promethazine (PHENERGAN) tablet 12.5 mg  12.5 mg Oral Q6H PRN    Or    ondansetron (ZOFRAN) injection 4 mg  4 mg IntraVENous Q6H PRN    metoprolol succinate (TOPROL-XL) XL tablet 25 mg  25 mg Oral BID    morphine injection 2 mg  2 mg IntraVENous Q3H PRN    hydrALAZINE (APRESOLINE) 20 mg/mL injection 10 mg  10 mg IntraVENous Q6H PRN    enoxaparin (LOVENOX) injection 40 mg  40 mg SubCUTAneous Q12H    acetaminophen (TYLENOL) tablet 650 mg  650 mg Oral Q6H    amLODIPine (NORVASC) tablet 2.5 mg  2.5 mg Oral DAILY    atorvastatin (LIPITOR) tablet 40 mg  40 mg Oral DAILY    [Held by provider] pantoprazole (PROTONIX) tablet 40 mg  40 mg Oral DAILY         Allergies         Incruse ellipta [umeclidinium] and Pcn [penicillins]       Labs/Imaging/Diagnostics      Labs:  Recent Results (from the past 48 hour(s))   CBC WITH AUTOMATED DIFF    Collection Time: 04/05/21 12:50 PM   Result Value Ref Range    WBC 7.0 4.4 - 11.3 K/uL    RBC 3.94 (L) 4.50 - 5.90 M/uL    HGB 11.5 (L) 13.5 - 17.5 g/dL    HCT 35.3 (L) 36 - 46 %    MCV 89.5 80 - 100 FL    MCH 29.2 (L) 31 - 34 PG    MCHC 32.7 31.0 - 36.0 g/dL    RDW 14.2 11.5 - 14.5 %    PLATELET 180 880 - 509 K/uL    MPV 7.8 6.5 - 11.5 FL    NRBC 0.1  WBC    ABSOLUTE NRBC 0.00 K/uL    NEUTROPHILS 78 (H) 42 - 75 %    LYMPHOCYTES 14 (L) 20.5 - 51.1 %    MONOCYTES 6 1.7 - 9.3 %    EOSINOPHILS 1 0.9 - 2.9 %    BASOPHILS 1 0.0 - 2.5 %    ABS. NEUTROPHILS 5.5 1.8 - 7.7 K/UL    ABS. LYMPHOCYTES 1.0 1.0 - 4.8 K/UL    ABS. MONOCYTES 0.4 0.2 - 2.4 K/UL    ABS. EOSINOPHILS 0.1 0.0 - 0.7 K/UL    ABS.  BASOPHILS 0.0 0.0 - 0.2 K/UL   METABOLIC PANEL, COMPREHENSIVE    Collection Time: 04/05/21 12:50 PM   Result Value Ref Range    Sodium 141 136 - 145 mmol/L    Potassium 4.0 3.5 - 5.1 mmol/L    Chloride 106 97 - 108 mmol/L    CO2 28 21 - 32 mmol/L    Anion gap 7 5 - 15 mmol/L    Glucose 97 65 - 100 mg/dL    BUN 15 6 - 20 mg/dL    Creatinine 0.86 0.55 - 1.02 mg/dL    BUN/Creatinine ratio 17 12 - 20      GFR est AA >60 >60 ml/min/1.73m2    GFR est non-AA >60 >60 ml/min/1.73m2    Calcium 8.7 8.5 - 10.1 mg/dL    Bilirubin, total 0.4 0.2 - 1.0 mg/dL    AST (SGOT) 26 15 - 37 U/L    ALT (SGPT) 18 12 - 78 U/L    Alk. phosphatase 99 45 - 117 U/L    Protein, total 6.6 6.4 - 8.2 g/dL    Albumin 3.2 (L) 3.5 - 5.0 g/dL    Globulin 3.4 2.0 - 4.0 g/dL    A-G Ratio 0.9 (L) 1.1 - 2.2     TROPONIN I    Collection Time: 04/05/21 12:50 PM   Result Value Ref Range    Troponin-I, Qt. <0.05 <0.05 ng/mL   TROPONIN I    Collection Time: 04/05/21  6:50 PM   Result Value Ref Range    Troponin-I, Qt. <0.05 <0.05 ng/mL   TROPONIN I    Collection Time: 04/06/21 12:45 AM   Result Value Ref Range    Troponin-I, Qt. <0.05 <0.05 ng/mL        Imaging:  Ct Head Wo Cont    Result Date: 4/5/2021  Age-appropriate atrophy. No acute findings. Ct Abd Pelv Wo Cont    Result Date: 4/5/2021  Fracture of L1 with minimal loss of height. Colonic diverticulosis without acute diverticulitis. Other findings as above.        Assessment//Plan           Problem List:  Hospital Problems  Date Reviewed: 2/15/2021          Codes Class Noted POA    * (Principal) Fracture of L1 vertebra ([facility] [address] ICD-10-CM: S32.019A  ICD-9-CM: 805.4  4/5/2021 Unknown        Intractable pain ICD-10-CM: R52  ICD-9-CM: 780.96  4/5/2021 Unknown        L1 vertebral fracture ([facility] [address] ICD-10-CM: S32.019A  ICD-9-CM: 805.4  4/5/2021 Unknown              Acute L1 fracture   -  noted transverse fracture in the midportion of the L1 vertebral body with minimal loss of height   -   Dr. Ese Mcneil (ortho) has been consulted and discussed with him via [employer] on 4/6 and stated discharge and follow-up with him on outpatient  -Continue scheduled Tylenol and Robaxin but increase dosing from 500 mg 3 times daily to 750 mg 3 times daily. - These can be up-titrated as necessary for pain relief. PT consulted placed. Intractable pain  -Secondary to L1 vertebral body fracture  -Initial pain management ineffective and on 4/6 changed to oxycodone 5 mg oral every 6 hours along with Tylenol 650 mg oral every 6 hours and Robaxin increased to 750 mg oral 3 times a day and fentanyl 25 mcg patch initiated and continues on as needed IV morphine 2 mg every 3 hours as needed for severe pain    Nausea and vomiting  -Patient had nausea and vomiting episode this morning secondary to intractable pain but also patient has underlying gastritis symptoms as patient takes pantoprazole and sacral fate  -Started on Pepcid 20 mg IV twice daily  -Increased diet to GI soft cardiac  -Continue antiemetics as needed    Status post fall  -Mechanical fall no loss of consciousness but did hit her head and CT scan of the head was done after arrival to acute care and found to be negative also CTs of the cervical spine and thoracic spine were done and shows moderate degenerative disc disease but no further fractures noted  -Evaluation and treatment    Hypertension:  -Continue patient's home metoprolol succinate 25 mg twice daily and as needed IV hydralazine  -Vitals every 4 hours    Hyperlipidemia  -New patient home statin    GERD:  -Continue patient on Pepcid IV twice daily    DVT prophylaxis  -Therapeutic dose of Lovenox 40 mg subcu twice daily    Full Code   Designated that kathi Roberts Daniela ([phone] and Wendy Moder -[phone]) would make decisions for Dahila Cartwright if she would become unable to. Spent 30 minutes evaluting and coordinating patient care of which >50% was spent coordinating and counseling.   Electronically signed by Alessandro Bell MD on 4/6/2021 at 1:20 PM

## 2021-04-06 NOTE — PROGRESS NOTES
Problem: Falls - Risk of  Goal: *Absence of Falls  Description: Document Burrell Canavan Fall Risk and appropriate interventions in the flowsheet. Outcome: Progressing Towards Goal  Note: Fall Risk Interventions:                 Elimination Interventions: Call light in reach, Patient to call for help with toileting needs    History of Falls Interventions: Door open when patient unattended, Room close to nurse's station         Problem: Patient Education: Go to Patient Education Activity  Goal: Patient/Family Education  Outcome: Progressing Towards Goal     Problem: Pressure Injury - Risk of  Goal: *Prevention of pressure injury  Description: Document Jordan Scale and appropriate interventions in the flowsheet.   Outcome: Progressing Towards Goal  Note: Pressure Injury Interventions:  Sensory Interventions: Keep linens dry and wrinkle-free, Minimize linen layers    Moisture Interventions: Absorbent underpads, Minimize layers, Moisture barrier    Activity Interventions: PT/OT evaluation    Mobility Interventions: PT/OT evaluation    Nutrition Interventions: Document food/fluid/supplement intake    Friction and Shear Interventions: Minimize layers, Transferring/repositioning devices                Problem: Patient Education: Go to Patient Education Activity  Goal: Patient/Family Education  Outcome: Progressing Towards Goal     Problem: Patient Education: Go to Patient Education Activity  Goal: Patient/Family Education  Outcome: Progressing Towards Goal

## 2021-04-06 NOTE — PROGRESS NOTES
PHYSICAL THERAPY EVALUATION  Patient: Claudia Webster (2 y.o. female)  Date: 4/6/2021  Primary Diagnosis: Fracture of L1 vertebra (HCC) [S32.019A]  Intractable pain [R52]  L1 vertebral fracture (HCC) [S32.019A]        Precautions: increasing pain with mobility       ASSESSMENT  Based on the objective data described below, the patient presents increasing lumbar pain with mobility that is impeding functional mobility. Patient is able to perform bed mobility and allow the placement of lumbar support. Once pain level is controlled, patient should be able to progress to gait training as tolerated with an assistive device. Current Level of Function Impacting Discharge (mobility/balance): Tolerance to lumbar pain    Other factors to consider for discharge: Safety in Home environment     Patient will benefit from skilled therapy intervention to address the above noted impairments. PLAN :  Recommendations and Planned Interventions: transfer training, gait training, and therapeutic exercises      Frequency/Duration: Patient will be followed by physical therapy:  1-2 times daily, up to 5 days a week, to address goals. Recommendation for discharge: (in order for the patient to meet his/her long term goals)  To be determined: This discharge recommendation:  A follow-up discussion with the attending provider and/or case management is planned    IF patient discharges home will need the following DME: walker and rollator         SUBJECTIVE:   Patient stated that she has been vomiting. Stomach feels funny. She will unfasten lumbar support if it becomes uncomfortable.     OBJECTIVE DATA SUMMARY:   HISTORY:    Past Medical History:   Diagnosis Date    Anemia 6/24/2020    Anxiety 6/24/2020    Arthritis 6/24/2020    Breast cancer ([facility] [address] 6/24/2020    Depression 6/24/2020    GERD (gastroesophageal reflux disease) 6/24/2020    Heart disease 6/24/2020    Hyperlipidemia 6/24/2020    Hypertension 6/24/2020    Pulmonary disease 2020    Sleep disorder 2020    [facility]roke [facility]cerebrum[facility]) 2020     Past Surgical History:   Procedure Laterality Date    HX BREAST LUMPECTOMY      HX TUBAL LIGATION      NC REVISION AMBROSE-IMPLANT CAPSULE BREAST         Personal factors and/or comorbidities impacting plan of care: obesity    Home Situation  Home Environment: Private residence  One/Two Story Residence: One story  Living Alone: Yes  Support Systems: (family)  Patient Expects to be Discharged to[de-identified] Private residence  Current DME Used/Available at Home: None    EXAMINATION/PRESENTATION/DECISION MAKING:   Critical Behavior:  Neurologic State: Alert    Cognition: Appropriate decision making  Safety/Judgement: Good awareness of safety precautions  Hearing:   Auditory  Auditory Impairment: None  Range Of Motion:     Active movement of upper and lower extremities                       Strength:    Upper extremity - good                       Tone & Sensation: Sensation is intact                                       Functional Mobility:  Bed Mobility:  Rolling: Independent Patient was able to roll side to side tolerating           Transfers:  Not performed                                  Ambulation/Gait Training: None attempted today                                                        Stairs: None              Treatment Session:   Therapist applied lumbar support in supine position    Functional Measure:     Physical Therapy Evaluation Charge Determination   History Examination Presentation Decision-Making   LOW Complexity : Zero comorbidities / personal factors that will impact the outcome / POC LOW Complexity : 1-2 Standardized tests and measures addressing body structure, function, activity limitation and / or participation in recreation  LOW Complexity : Stable, uncomplicated  TU seconds      Based on the above components, the patient evaluation is determined to be of the following complexity level: LOW Pain Ratin    Activity Tolerance:   Fair    After treatment patient left in no apparent distress:   Supine in bed    COMMUNICATION/EDUCATION:   The patients plan of care was discussed with: Physical therapist and Physical therapy assistant. Fall prevention education was provided and the patient/caregiver indicated understanding. Thank you for this referral.  Brian Barragan, PT,    Time Calculation: 15 mins       Problem: Mobility Impaired (Adult and Pediatric)  Goal: *Acute Goals and Plan of Care (Insert Text)  Description: FUNCTIONAL STATUS PRIOR TO ADMISSION: Patient was independent for basic and instrumental ADLs. HOME SUPPORT PRIOR TO ADMISSION: The patient lived alone with family to provide assistance. Physical Therapy Goals  Initiated 2021  1. Patient will move from supine to sit and sit to supine  and roll side to side in bed with independence within 7 day(s). 2.  Patient will transfer from bed to chair and chair to bed with independence using the least restrictive device within 7 day(s). 3.  Patient will perform sit to stand with independence within 7 day(s). 4.  Patient will ambulate with independence for 50 feet with the least restrictive device within 7 day(s). 5.  Patient will ascend/descend 4 stairs with  handrail(s) with modified independence within 7 day(s).       Outcome: Not Met

## 2021-04-06 NOTE — PROGRESS NOTES
Care Management Interventions  PCP Verified by CM: Yes  Mode of Transport at Discharge: Other (see comment)(family)  Transition of Care Consult (CM Consult): Discharge Planning  Physical Therapy Consult: Yes  Current Support Network: Lives Alone, Own Home  Confirm Follow Up Transport: Family  The Plan for Transition of Care is Related to the Following Treatment Goals : Plan to dischage home with possible home health services. Pending PT evaluation.

## 2021-04-06 NOTE — PROGRESS NOTES
SPEECH LANGUAGE PATHOLOGY BEDSIDE SWALLOW EVALUATION AND DISCHARGE    Patient: Sy King (86 y.o. female)  Date: 4/6/2021  Primary Diagnosis: Fracture of L1 vertebra (HCC) [S32.019A]  Intractable pain [R52]  L1 vertebral fracture (HCC) [S32.019A]       Precautions: aspiration, fall         ASSESSMENT :  Clinical beside swallow eval completed per MD orders. Pt A&Ox4. Functional communication. Intelligibility >90%. Cognitive-linguistic function appears intact. OM examination revealed oral motor structures functional for mastication and deglutition. Presented with thin liquid, puree, and solid trials. Exhibited adequate bolus cohesion, manipulation and A-P transit. Patient able to manipulate and clear with no clinical s/s aspiration and/or oropharyngeal dysphagia. Of note, patient limited tolerance to upright positioning puts her at increased risk for aspiration; however, no other risk factors present during bedside evaluation. Patient bed adjusted to [facility] at 15 degrees and tilt at -10 degrees until she stated she could not go further. This did place her in a more upright and safer position for PO intake, but for optimal safety HOB could and should be raised further as tolerated. Thank you for this referral.   SKYLER Rollins     PLAN :  Recommendations and Planned Interventions:  -No formal ST needs for dysphagia indicated at this time. SLP educated pt on role of speech therapist in current setting with re: speech/swallow; verbalized comprehension. SLP available for re-evaluation if indicated by MD.   -Patient requested a softer diet d/t inability to tolerate dentures without N/V and decreased endurance  -Increase upright positioning as tolerated  -Follow general aspiration and GERD precautions. Discharge Recommendations: Home Health     SUBJECTIVE:   Patient stated I just feel so nauseous.     OBJECTIVE:     Past Medical History:   Diagnosis Date    Anemia 6/24/2020    Anxiety 6/24/2020  Arthritis 6/24/2020    Breast cancer ([facility] 6/24/2020    Depression 6/24/2020    GERD (gastroesophageal reflux disease) 6/24/2020    Heart disease 6/24/2020    Hyperlipidemia 6/24/2020    Hypertension 6/24/2020    Pulmonary disease 6/24/2020    Sleep disorder 6/24/2020    Stroke (cerebrum) ([facility] 6/24/2020 2012     Past Surgical History:   Procedure Laterality Date    HX BREAST LUMPECTOMY  1996    HX TUBAL LIGATION      FL REVISION AMBROSE-IMPLANT CAPSULE BREAST       Home Situation:   Home Situation  Home Environment: Private residence  One/Two Story Residence: One story  Living Alone: Yes  Support Systems: Family member(s)  Patient Expects to be Discharged to[de-identified] Private residence  Current DME Used/Available at Home: None    Diet prior to admission: Patient reports she eats \"everything\" she wants at home  Current Diet:  GI soft     Cognitive and Communication Status:  Neurologic State: Alert  Orientation Level: Oriented X4  Cognition: Follows commands     Perseveration: No perseveration noted  Safety/Judgement: Good awareness of safety precautions  Oral Assessment:  Oral Assessment  Labial: No impairment  Dentition: Edentulous  Oral Hygiene: Good  Lingual: No impairment  Velum: Unable to visualize  Mandible: No impairment  P.O. Trials:  Patient Position: HOB adjusted to 15 degreees, tilt to -9 degrees  Vocal quality prior to P.O.: No impairment  Consistency Presented: Mechanical soft;Puree; Thin liquid  How Presented: Self-fed/presented     Bolus Acceptance: No impairment  Bolus Formation/Control: No impairment     Propulsion: No impairment  Oral Residue: None  Initiation of Swallow: No impairment  Laryngeal Elevation: Functional  Aspiration Signs/Symptoms: None  Pharyngeal Phase Characteristics: No impairment, issues, or problems   Effective Modifications: None  Cues for Modifications: None       Oral Phase Severity: No impairment  Pharyngeal Phase Severity : No impairment    PAIN:  Pain level pre-treatment: 7/10   Pain level post-treatment: 7/10   Pain Intervention(s): Medication (see MAR); Rest, Ice, Repositioning   Response to intervention: Nurse notified    After evaluation:   []            Patient left in no apparent distress sitting up in chair  [x]            Patient left in no apparent distress in bed  [x]            Call bell left within reach  [x]            Nursing notified  [x]            Family present  []            Caregiver present  []            Bed alarm activated      COMMUNICATION/EDUCATION:   [x]            Aspiration precautions; swallow safety; compensatory techniques. [x]            Patient/family have participated as able in goal setting and plan of care. []            Patient/family agree to work toward stated goals and plan of care. []            Patient understands intent and goals of therapy; neutral about participation. []            Patient unable to participate in goal setting/plan of care; educ ongoing with interdisciplinary staff  []         Posted safety precautions in patient's room. The patient's plan of care including recommendations, planned interventions, and recommended diet changes were discussed with: Registered nurse.      Thank you for this referral.  SKYLER Bateman  Time Calculation: 31 mins

## 2021-04-06 NOTE — ROUTINE PROCESS
The pt have settle some with the nausea. She have been asking for liqs. She is rating the pain 4/10. Will cont to monitor. No further heaving is noted.

## 2021-04-06 NOTE — ROUTINE PROCESS
Bedside shift report received from [facility]. The report consist of using SBAR.

## 2021-04-06 NOTE — ROUTINE PROCESS
The pt have a pure wick in place now for voiding. She is c/o of pain to her back area along with have some nausea. The pt is very uncomfortable with any movement. She is rating the pain 8/10. She was given morphine 2mg Ivp at this time,also she was given zofran 4mg Ivp. Will monitor.

## 2021-04-06 NOTE — ROUTINE PROCESS
The pt is again moaning c/o of back pain. She was given morphine 2mg Ivp for pain of 8/10. She remains in the supine position. Will cont to monitor.

## 2021-04-06 NOTE — ROUTINE PROCESS
The pt was given MS 2mg Ivp for c/o of back pain for pain 8/10. Will cont to monitor. 24 hr chart review was complete.

## 2021-04-06 NOTE — ROUTINE PROCESS
The pt is again awake moaning c/o of having back pain along with having a headache. She was given MS 2mg Ivp for pain 8/10. Will monitor.

## 2021-04-07 PROCEDURE — 74011250636 HC RX REV CODE- 250/636: Performed by: NURSE PRACTITIONER

## 2021-04-07 PROCEDURE — 74011250637 HC RX REV CODE- 250/637: Performed by: HOSPITALIST

## 2021-04-07 PROCEDURE — 74011000250 HC RX REV CODE- 250: Performed by: HOSPITALIST

## 2021-04-07 PROCEDURE — 74011250637 HC RX REV CODE- 250/637: Performed by: NURSE PRACTITIONER

## 2021-04-07 PROCEDURE — 74011250636 HC RX REV CODE- 250/636: Performed by: HOSPITALIST

## 2021-04-07 PROCEDURE — 97530 THERAPEUTIC ACTIVITIES: CPT

## 2021-04-07 PROCEDURE — 65270000029 HC RM PRIVATE

## 2021-04-07 RX ORDER — OXYCODONE HYDROCHLORIDE 5 MG/1
5 TABLET ORAL
Status: DISCONTINUED | OUTPATIENT
Start: 2021-04-07 | End: 2021-04-08 | Stop reason: HOSPADM

## 2021-04-07 RX ORDER — ACETAMINOPHEN 325 MG/1
650 TABLET ORAL
Status: DISCONTINUED | OUTPATIENT
Start: 2021-04-07 | End: 2021-04-08 | Stop reason: HOSPADM

## 2021-04-07 RX ORDER — OXYCODONE HYDROCHLORIDE 10 MG/1
10 TABLET ORAL EVERY 6 HOURS
Status: DISCONTINUED | OUTPATIENT
Start: 2021-04-07 | End: 2021-04-07

## 2021-04-07 RX ORDER — METHOCARBAMOL 500 MG/1
500 TABLET, FILM COATED ORAL 3 TIMES DAILY
Status: DISCONTINUED | OUTPATIENT
Start: 2021-04-07 | End: 2021-04-08 | Stop reason: HOSPADM

## 2021-04-07 RX ORDER — ALPRAZOLAM 0.5 MG/1
1 TABLET ORAL
Status: DISCONTINUED | OUTPATIENT
Start: 2021-04-07 | End: 2021-04-08 | Stop reason: HOSPADM

## 2021-04-07 RX ORDER — MAG HYDROX/ALUMINUM HYD/SIMETH 200-200-20
30 SUSPENSION, ORAL (FINAL DOSE FORM) ORAL
Status: COMPLETED | OUTPATIENT
Start: 2021-04-07 | End: 2021-04-07

## 2021-04-07 RX ORDER — AMLODIPINE BESYLATE 10 MG/1
10 TABLET ORAL DAILY
Status: DISCONTINUED | OUTPATIENT
Start: 2021-04-07 | End: 2021-04-08 | Stop reason: HOSPADM

## 2021-04-07 RX ORDER — AMLODIPINE BESYLATE 5 MG/1
5 TABLET ORAL DAILY
Status: DISCONTINUED | OUTPATIENT
Start: 2021-04-07 | End: 2021-04-07

## 2021-04-07 RX ORDER — MORPHINE SULFATE 2 MG/ML
2 INJECTION, SOLUTION INTRAMUSCULAR; INTRAVENOUS
Status: DISCONTINUED | OUTPATIENT
Start: 2021-04-07 | End: 2021-04-08 | Stop reason: HOSPADM

## 2021-04-07 RX ADMIN — METHOCARBAMOL TABLETS 500 MG: 500 TABLET, COATED ORAL at 21:21

## 2021-04-07 RX ADMIN — FAMOTIDINE 20 MG: 10 INJECTION, SOLUTION INTRAVENOUS at 21:20

## 2021-04-07 RX ADMIN — ENOXAPARIN SODIUM 40 MG: 40 INJECTION SUBCUTANEOUS at 17:15

## 2021-04-07 RX ADMIN — ACETAMINOPHEN 650 MG: 325 TABLET ORAL at 05:04

## 2021-04-07 RX ADMIN — MORPHINE SULFATE 2 MG: 2 INJECTION, SOLUTION INTRAMUSCULAR; INTRAVENOUS at 21:20

## 2021-04-07 RX ADMIN — METOPROLOL SUCCINATE 25 MG: 25 TABLET, FILM COATED, EXTENDED RELEASE ORAL at 21:21

## 2021-04-07 RX ADMIN — METHOCARBAMOL TABLETS 500 MG: 500 TABLET, COATED ORAL at 15:40

## 2021-04-07 RX ADMIN — METOPROLOL SUCCINATE 25 MG: 25 TABLET, FILM COATED, EXTENDED RELEASE ORAL at 09:55

## 2021-04-07 RX ADMIN — METHOCARBAMOL TABLETS 750 MG: 500 TABLET, COATED ORAL at 09:55

## 2021-04-07 RX ADMIN — MORPHINE SULFATE 2 MG: 2 INJECTION, SOLUTION INTRAMUSCULAR; INTRAVENOUS at 15:40

## 2021-04-07 RX ADMIN — ALUMINUM HYDROXIDE, MAGNESIUM HYDROXIDE, AND SIMETHICONE 30 ML: 200; 200; 20 SUSPENSION ORAL at 09:55

## 2021-04-07 RX ADMIN — FAMOTIDINE 20 MG: 10 INJECTION, SOLUTION INTRAVENOUS at 09:55

## 2021-04-07 RX ADMIN — Medication 10 ML: at 21:21

## 2021-04-07 RX ADMIN — ACETAMINOPHEN 650 MG: 325 TABLET ORAL at 13:17

## 2021-04-07 RX ADMIN — AMLODIPINE BESYLATE 10 MG: 10 TABLET ORAL at 09:55

## 2021-04-07 RX ADMIN — ENOXAPARIN SODIUM 40 MG: 40 INJECTION SUBCUTANEOUS at 05:04

## 2021-04-07 RX ADMIN — SIMETHICONE 80 MG: 80 TABLET, CHEWABLE ORAL at 15:40

## 2021-04-07 RX ADMIN — ENOXAPARIN SODIUM 40 MG: 40 INJECTION SUBCUTANEOUS at 17:14

## 2021-04-07 RX ADMIN — Medication 10 ML: at 13:15

## 2021-04-07 RX ADMIN — Medication 5 MG: at 21:21

## 2021-04-07 RX ADMIN — Medication 10 ML: at 05:03

## 2021-04-07 RX ADMIN — OXYCODONE 5 MG: 5 TABLET ORAL at 05:03

## 2021-04-07 RX ADMIN — ATORVASTATIN CALCIUM 40 MG: 40 TABLET, FILM COATED ORAL at 09:55

## 2021-04-07 NOTE — PROGRESS NOTES
PHYSICAL THERAPY TREATMENT  Patient: Claudia Webster (35 y.o. female)  Date: 4/7/2021  Diagnosis: Fracture of L1 vertebra (HCC) [S32.019A]  Intractable pain [R52]  L1 vertebral fracture (HCC) [S32.019A] Fracture of L1 vertebra (HCC)       Precautions: Fall risk    Chart, physical therapy assessment, plan of care and goals were reviewed. ASSESSMENT  Patient continues with skilled PT services and is progressing towards goals. Pt in increased pain throughout treatment. Pt able to transfer supine to sit with supervision and set up of bed in position to allow ease of movement. Pt sat EOB for 8 minutes, demonstrating progress. Current Level of Function Impacting Discharge (mobility/balance): decreased mobility and strength     Other factors to consider for discharge: Pt in a lot of pain and nauseous          PLAN :  Patient continues to benefit from skilled intervention to address the above impairments. Continue treatment per established plan of care. to address goals. Recommendation for discharge: (in order for the patient to meet his/her long term goals)  Therapy up to 5 days/week in SNF setting or intensive home health therapy program    This discharge recommendation:  Has been made in collaboration with the attending provider and/or case management    IF patient discharges home will need the following DME: to be determined (TBD)       SUBJECTIVE:   Patient stated i'm in a lot of pain right now.     OBJECTIVE DATA SUMMARY:   Critical Behavior:  Neurologic State: Alert  Orientation Level: Oriented X4  Cognition: Appropriate decision making  Safety/Judgement: Good awareness of safety precautions  Functional Mobility Training:  Bed Mobility:  Rolling: Modified independent  Supine to Sit: Setup;Geni  Sit to Supine: Modified independent;Setup  Scooting: Modified independent        Transfers:                                   Balance:  Sitting: With support; Intact  Ambulation/Gait Training: Stairs:              Treatment Session:   Pt was pleasant throughout treatment, but in a lot of pain. Pt required set up of bed prior to supine to sit transfer to assist in transfer. Pt sat EOB for 8 minutes requiring reminders to breathe, since she frequently held her breath due to pain. Pt required minimal assistance for sit to supine transfer to assist with LE. Pain Rating:  10/10    Activity Tolerance:   Fair    After treatment patient left in no apparent distress:   Supine in bed and Call bell within reach    COMMUNICATION/COLLABORATION:   The patients plan of care was discussed with: Registered nurse. Nick Leroy, PT, DPT   Time Calculation: 25 mins           Problem: Mobility Impaired (Adult and Pediatric)  Goal: *Acute Goals and Plan of Care (Insert Text)  Description: FUNCTIONAL STATUS PRIOR TO ADMISSION: Patient was independent for basic and instrumental ADLs. HOME SUPPORT PRIOR TO ADMISSION: The patient lived alone with family to provide assistance. Physical Therapy Goals  Initiated 4/6/2021  1. Patient will move from supine to sit and sit to supine  and roll side to side in bed with independence within 7 day(s). 2.  Patient will transfer from bed to chair and chair to bed with independence using the least restrictive device within 7 day(s). 3.  Patient will perform sit to stand with independence within 7 day(s). 4.  Patient will ambulate with independence for 50 feet with the least restrictive device within 7 day(s). 5.  Patient will ascend/descend 4 stairs with  handrail(s) with modified independence within 7 day(s).        Outcome: Progressing towards

## 2021-04-07 NOTE — PROGRESS NOTES
Pt given mylicon tab for gi pain overnight and zofran for nausea with vomiting , pt abdomen remains distended, pt passing flatus, pain medication administered per mar,

## 2021-04-07 NOTE — PROGRESS NOTES
Progress Note  Date:4/7/2021       HAOS:428/73  Patient Name:Genny Elizabeth     YOB: 1940     Age:80 y.o. Subjective    HPI:  Lucian Washington is a [de-identified] y.o. female with PMH of Anemia, Anxiety, Breast CA s/p lumpectomy, Depression, GERD, COPD, HLD, HTN, CVA, and Mild Mitral Regurgitation who presented to ED this morning after GLF @ home. Patient reports was standing up in living room when she lost balance and fell backwards/sideways into a wooden rack and then fell to floor striking her left temple on the way down. Immediately had severe back pain and mild headache. Called EMS and was transported to ED for further workup and treatment. Pain remained intractable in ED and CT ABD/PELV found L1 fracture. Dr. Bobbi Gamez was consulted and recommendations given and patient accepted by [facility] team for treatment of intractable pain, bracing, and PT referral.  Upon my exam patient complains of mild headache, and moderate lower back pain. She reports that she had dizziness after fall but no other focal deficits. She is neurovascularly intact in BLE    Patient seen and evaluated resting in bed with marked improvement in overall pain patient work with therapy today was able on her own to get to side of bed they do recommend skilled rehab as patient does have history of falls over the last 1 year. Continue pain management adjustments tolerating oxycodone and did not require any IV pain medication overnight. Review of Systems   Constitutional: Negative. HENT: Negative. Eyes: Negative. Respiratory: Negative. Cardiovascular: Negative. Endocrine: Negative. Genitourinary: Negative. Musculoskeletal: Positive for back pain. Skin: Negative. Allergic/Immunologic: Negative. Neurological: Negative. Hematological: Negative. Psychiatric/Behavioral: Negative.         Objective           Vitals Last 24 Hours:  Patient Vitals for the past 24 hrs:   Temp Pulse Resp BP SpO2   04/07/21 1114 97.9 °F (36.6 °C) 73 16 (!) 155/72 93 %   04/07/21 0702 98.3 °F (36.8 °C) 80 17 139/69 90 %   04/06/21 2326 98.6 °F (37 °C) 85 16 (!) 160/79 --   04/06/21 2011 99.2 °F (37.3 °C) 89 20 (!) 161/78 --   04/06/21 1622 98.4 °F (36.9 °C) 85 19 (!) 155/77 --   04/06/21 1600 -- 94 -- -- --        I/O (24Hr): Intake/Output Summary (Last 24 hours) at 4/7/2021 1226  Last data filed at 4/6/2021 1800  Gross per 24 hour   Intake 218 ml   Output 1000 ml   Net -782 ml       Physical Exam:  General: Alert, cooperative, no distress, appears stated age. Head:  Normocephalic, without obvious abnormality, atraumatic. Eyes:  Conjunctivae/corneas clear. Pupils equal, round, reactive to light. Extraocular movements intact. Lungs:  Clear to auscultation bilaterally. no wheeze, rales, crackles, rhonchi   Chest wall: No tenderness or deformity. Heart:  Regular rate and rhythm, S1, S2 normal, no murmur, click, rub or gallop. Abdomen:  Soft, non-tender. Bowel sounds normal. No masses,  No organomegaly. Extremities: Extremities normal, atraumatic, no cyanosis or edema. Pulses: 2+ and symmetric all extremities. Skin: Skin color, texture, turgor normal. No rashes or lesions  Neurologic: Awake, Alert, oriented.  No obvious gross sensory or motor deficits      Medications           Current Facility-Administered Medications   Medication Dose Route Frequency    oxyCODONE IR (ROXICODONE) tablet 5 mg  5 mg Oral Q4H PRN    amLODIPine (NORVASC) tablet 10 mg  10 mg Oral DAILY    morphine injection 2 mg  2 mg IntraVENous Q4H PRN    albuterol (PROVENTIL VENTOLIN) nebulizer solution 2.5 mg  2.5 mg Nebulization Q6H PRN    famotidine (PF) (PEPCID) 20 mg in 0.9% sodium chloride 10 mL injection  20 mg IntraVENous Q12H    fentaNYL (DURAGESIC) 25 mcg/hr patch 1 Patch  1 Patch TransDERmal Q72H    melatonin (rapid dissolve) tablet 5 mg  5 mg Oral QHS    methocarbamoL (ROBAXIN) tablet 750 mg  750 mg Oral TID    simethicone (MYLICON) tablet 80 mg  80 mg Oral QID PRN    sodium chloride (NS) flush 5-40 mL  5-40 mL IntraVENous Q8H    sodium chloride (NS) flush 5-40 mL  5-40 mL IntraVENous PRN    polyethylene glycol (MIRALAX) packet 17 g  17 g Oral DAILY PRN    promethazine (PHENERGAN) tablet 12.5 mg  12.5 mg Oral Q6H PRN    Or    ondansetron (ZOFRAN) injection 4 mg  4 mg IntraVENous Q6H PRN    metoprolol succinate (TOPROL-XL) XL tablet 25 mg  25 mg Oral BID    hydrALAZINE (APRESOLINE) 20 mg/mL injection 10 mg  10 mg IntraVENous Q6H PRN    enoxaparin (LOVENOX) injection 40 mg  40 mg SubCUTAneous Q12H    acetaminophen (TYLENOL) tablet 650 mg  650 mg Oral Q6H    atorvastatin (LIPITOR) tablet 40 mg  40 mg Oral DAILY    [Held by provider] pantoprazole (PROTONIX) tablet 40 mg  40 mg Oral DAILY         Allergies         Incruse ellipta [umeclidinium] and Pcn [penicillins]       Labs/Imaging/Diagnostics      Labs:  Recent Results (from the past 48 hour(s))   CBC WITH AUTOMATED DIFF    Collection Time: 04/05/21 12:50 PM   Result Value Ref Range    WBC 7.0 4.4 - 11.3 K/uL    RBC 3.94 (L) 4.50 - 5.90 M/uL    HGB 11.5 (L) 13.5 - 17.5 g/dL    HCT 35.3 (L) 36 - 46 %    MCV 89.5 80 - 100 FL    MCH 29.2 (L) 31 - 34 PG    MCHC 32.7 31.0 - 36.0 g/dL    RDW 14.2 11.5 - 14.5 %    PLATELET 271 321 - 693 K/uL    MPV 7.8 6.5 - 11.5 FL    NRBC 0.1  WBC    ABSOLUTE NRBC 0.00 K/uL    NEUTROPHILS 78 (H) 42 - 75 %    LYMPHOCYTES 14 (L) 20.5 - 51.1 %    MONOCYTES 6 1.7 - 9.3 %    EOSINOPHILS 1 0.9 - 2.9 %    BASOPHILS 1 0.0 - 2.5 %    ABS. NEUTROPHILS 5.5 1.8 - 7.7 K/UL    ABS. LYMPHOCYTES 1.0 1.0 - 4.8 K/UL    ABS. MONOCYTES 0.4 0.2 - 2.4 K/UL    ABS. EOSINOPHILS 0.1 0.0 - 0.7 K/UL    ABS.  BASOPHILS 0.0 0.0 - 0.2 K/UL   METABOLIC PANEL, COMPREHENSIVE    Collection Time: 04/05/21 12:50 PM   Result Value Ref Range    Sodium 141 136 - 145 mmol/L    Potassium 4.0 3.5 - 5.1 mmol/L    Chloride 106 97 - 108 mmol/L    CO2 28 21 - 32 mmol/L    Anion gap 7 5 - 15 mmol/L    Glucose 97 65 - 100 mg/dL    BUN 15 6 - 20 mg/dL    Creatinine 0.86 0.55 - 1.02 mg/dL    BUN/Creatinine ratio 17 12 - 20      GFR est AA >60 >60 ml/min/1.73m2    GFR est non-AA >60 >60 ml/min/1.73m2    Calcium 8.7 8.5 - 10.1 mg/dL    Bilirubin, total 0.4 0.2 - 1.0 mg/dL    AST (SGOT) 26 15 - 37 U/L    ALT (SGPT) 18 12 - 78 U/L    Alk. phosphatase 99 45 - 117 U/L    Protein, total 6.6 6.4 - 8.2 g/dL    Albumin 3.2 (L) 3.5 - 5.0 g/dL    Globulin 3.4 2.0 - 4.0 g/dL    A-G Ratio 0.9 (L) 1.1 - 2.2     TROPONIN I    Collection Time: 04/05/21 12:50 PM   Result Value Ref Range    Troponin-I, Qt. <0.05 <0.05 ng/mL   TROPONIN I    Collection Time: 04/05/21  6:50 PM   Result Value Ref Range    Troponin-I, Qt. <0.05 <0.05 ng/mL   TROPONIN I    Collection Time: 04/06/21 12:45 AM   Result Value Ref Range    Troponin-I, Qt. <0.05 <0.05 ng/mL        Imaging:  Ct Head Wo Cont    Result Date: 4/5/2021  Age-appropriate atrophy. No acute findings. Ct Abd Pelv Wo Cont    Result Date: 4/5/2021  Fracture of L1 with minimal loss of height. Colonic diverticulosis without acute diverticulitis. Other findings as above.        Assessment//Plan           Problem List:  Hospital Problems  Date Reviewed: 2/15/2021          Codes Class Noted POA    * (Principal) Fracture of L1 vertebra ([facility] ICD-10-CM: S32.019A  ICD-9-CM: 805.4  4/5/2021 Unknown        Intractable pain ICD-10-CM: R52  ICD-9-CM: 780.96  4/5/2021 Unknown        L1 vertebral fracture ([facility] ICD-10-CM: S32.019A  ICD-9-CM: 805.4  4/5/2021 Unknown              Acute L1 fracture   -  noted transverse fracture in the midportion of the L1 vertebral body with minimal loss of height   -   Dr. Leatha Graves (ortho) has been consulted and discussed with him via [employer] on 4/6 and stated discharge and follow-up with him on outpatient  -Continue scheduled Tylenol and Robaxin at increased dose of  750 mg 3 times daily.    -We will reevaluate in a.m. about possibly decreasing dosing  -4/7 patient was able to sit on the side of the bed PT evaluated patient and recommending skilled rehab and case management is aware and patient is okay with going to rehab for short period time    Intractable pain  -Secondary to L1 vertebral body fracture  -Initial pain management ineffective and on 4/6 changed to oxycodone 5 mg oral every 6 hours along with Tylenol 650 mg oral every 6 hours and Robaxin increased to 750 mg oral 3 times a day and fentanyl 25 mcg patch initiated and continues on as needed IV morphine 2 mg every 3 hours as needed for severe pain  -4/7: Patient's pain is markedly improved will continue Robaxin as well as change Tylenol to as needed and will also change oxycodone to every 4 hours as needed and continue IV as needed dosing for severe pain    Nausea and vomiting  -Patient had nausea and vomiting episode this morning secondary to intractable pain but also patient has underlying gastritis symptoms as patient takes pantoprazole and sacral fate and patient has improvement in symptoms after dose of simethicone which him proves her symptoms still no bowel movement so we will start bowel regimen today  -Started on Pepcid 20 mg IV twice daily  -Increased diet to GI soft cardiac  -Continue antiemetics as needed    Status post fall  -Mechanical fall no loss of consciousness but did hit her head and CT scan of the head was done after arrival to acute care and found to be negative also CTs of the cervical spine and thoracic spine were done and shows moderate degenerative disc disease but no further fractures noted  -Evaluation and treatment    Hypertension:  -Continue patient's home metoprolol succinate 25 mg twice daily and as needed IV hydralazine  -Vitals every 4 hours    Hyperlipidemia  -New patient home statin    GERD:  -Continue patient on Pepcid IV twice daily    DVT prophylaxis  -Therapeutic dose of Lovenox 40 mg subcu twice daily    Full Code   Designated that kathi Miranda ([phone] and [place] -[phone]) would make decisions for Lucian Washington if she would become unable to. Spent 30 minutes evaluting and coordinating patient care of which >50% was spent coordinating and counseling.   Electronically signed by Corina Beavers MD on 4/7/2021 at 1:20 PM

## 2021-04-07 NOTE — ROUTINE PROCESS
Spoke to NIKOLAY Andrade regarding pt anxiety & at home prescription for anti-anxiety medication at night.new to my practice orders given.

## 2021-04-07 NOTE — PROGRESS NOTES
Spiritual Care Assessment/Progress Note  [facility]      NAME: Chilo Wells      MRN: [id number]  AGE: [de-identified] y.o.  SEX: female  Pentecostal Affiliation: No preference   Language: English     4/7/2021     Total Time (in minutes): 15     Spiritual Assessment begun in [facility] [address] through conversation with:         [x]Patient        [] Family    [] Friend(s)        Reason for Consult: Initial/Spiritual assessment, patient floor     Spiritual beliefs: (Please include comment if needed)     [x] Identifies with a sarah tradition:         [] Supported by a sarah community:            [] Claims no spiritual orientation:           [] Seeking spiritual identity:                [] Adheres to an individual form of spirituality:           [] Not able to assess:                           Identified resources for coping:      [x] Prayer                               [] Music                  [] Guided Imagery     [x] Family/friends                 [] Pet visits     [] Devotional reading                         [] Unknown     [] Other:                                             Interventions offered during this visit: (See comments for more details)    Patient Interventions: Affirmation of sarah, Iconic (affirming the presence of God/Higher Power), Prayer (actual), Initial/Spiritual assessment, patient floor, Catharsis/review of pertinent events in supportive environment, Affirmation of emotions/emotional suffering, Normalization of emotional/spiritual concerns, Pentecostal beliefs/image of God discussed           Plan of Care:     [] Support spiritual and/or cultural needs    [] Support AMD and/or advance care planning process      [] Support grieving process   [] Coordinate Rites and/or Rituals    [] Coordination with community clergy   [] No spiritual needs identified at this time   [] Detailed Plan of Care below (See Comments)  [] Make referral to Music Therapy  [] Make referral to Pet Therapy     [] Make referral to Addiction services  [] Make referral to [facility]  [] Make referral to Spiritual Care Partner  [] No future visits requested        [x] Follow up upon further referrals     Comments:  visited pt, Miss Meryle Sparks on 2 Acute unit, for initial spiritual assessment. Pt was on the phone with her sister in law out of state. She however ended her call and welcomed  warmly. She became tearful when  introduced himself, sharing her struggles with her medical condition. She stated that was doing a little better, compared to a few days ago. When she inquired and got to know  was [facility], she seemed to settle down a little bit more. She shared with a smile that she too is [address] is very important for coping, though she hasn't been to [facility] for a while.  offered active listening presence and affirmation. Pt was also encouraged to focus on the present.  offered to pray with pt at the end of the visit. She accepted the offer with a warm smile. Pt thanked  for the visit and prayer. Visited by: Surinder Quinonez.    can be reached by calling the  at [facility]  [phone]

## 2021-04-07 NOTE — PROGRESS NOTES
Problem: Falls - Risk of  Goal: *Absence of Falls  Description: Document Aide Ruts Fall Risk and appropriate interventions in the flowsheet. Outcome: Progressing Towards Goal  Note: Fall Risk Interventions:            Medication Interventions: Evaluate medications/consider consulting pharmacy    Elimination Interventions: Call light in reach, Toilet paper/wipes in reach    History of Falls Interventions: Door open when patient unattended         Problem: Patient Education: Go to Patient Education Activity  Goal: Patient/Family Education  Outcome: Progressing Towards Goal     Problem: Pressure Injury - Risk of  Goal: *Prevention of pressure injury  Description: Document Jordan Scale and appropriate interventions in the flowsheet.   Outcome: Progressing Towards Goal  Note: Pressure Injury Interventions:  Sensory Interventions: Keep linens dry and wrinkle-free    Moisture Interventions: Internal/External urinary devices    Activity Interventions: PT/OT evaluation    Mobility Interventions: PT/OT evaluation    Nutrition Interventions: Document food/fluid/supplement intake    Friction and Shear Interventions: Minimize layers, Transferring/repositioning devices                Problem: Patient Education: Go to Patient Education Activity  Goal: Patient/Family Education  Outcome: Not Progressing Towards Goal     Problem: Patient Education: Go to Patient Education Activity  Goal: Patient/Family Education  Outcome: Not Progressing Towards Goal

## 2021-04-07 NOTE — PROGRESS NOTES
PHYSICAL THERAPY TREATMENT  Patient: Dahlia Cartwright (16 y.o. female)  Date: 4/7/2021  Diagnosis: Fracture of L1 vertebra (HCC) [S32.019A]  Intractable pain [R52]  L1 vertebral fracture (HCC) [S32.019A] Fracture of L1 vertebra (HCC)       Precautions:  Fall risk  Chart, physical therapy assessment, plan of care and goals were reviewed. ASSESSMENT  Patient continues with skilled PT services and is progressing towards goals. Pt able to get EOB with stand-by assistance for cueing and set-up of bed to allow for ease during transfer. Pt sat EOB for 5 minutes and required minimum assistance to lift LE up onto bed. Pt nauseous throughout treatment. Current Level of Function Impacting Discharge (mobility/balance): Decreased mobility and strength     Other factors to consider for discharge: Pt limited by pain          PLAN :  Patient continues to benefit from skilled intervention to address the above impairments. Continue treatment per established plan of care. to address goals. Recommendation for discharge: (in order for the patient to meet his/her long term goals)  Therapy up to 5 days/week in SNF setting or intensive home health therapy program    This discharge recommendation:  Has been made in collaboration with the attending provider and/or case management    IF patient discharges home will need the following DME: to be determined (TBD)       SUBJECTIVE:   Patient stated I want to try to get to the side of the bed.     OBJECTIVE DATA SUMMARY:   Critical Behavior:  Neurologic State: Alert  Orientation Level: Oriented X4  Cognition: Appropriate decision making  Safety/Judgement: Good awareness of safety precautions  Functional Mobility Training:  Bed Mobility:  Rolling: Independent  Supine to Sit: Stand-by assistance;Setup  Sit to Supine: Minimum assistance;Setup  Scooting: Independent        Transfers:                                   Balance:  Sitting: With support; Intact  Ambulation/Gait Training: Stairs:              Treatment Session:   Pt was pleasant throughout treatment and continued to be nauseous with change of positions. Pt required stand-by assistance and set up of bed for supine to sit transfer. Pt cautious during transfer due to apprehension of movement increasing back pain. Pt able to sit EOB for 5 minutes with good static sitting balance noted. Pt required increased assistance (min A) for sit to supine transfer to help move LE onto bed. Pt able to scoot independently into comfortable position. Pain Rating:  Initially 3/10, increased to 4/10 after treatment. Activity Tolerance:   Fair    After treatment patient left in no apparent distress:   Supine in bed and Call bell within reach    COMMUNICATION/COLLABORATION:   The patients plan of care was discussed with: Registered nurse, Physician, and Case management. Tania Gaines, PT, DPT   Time Calculation: 24 mins         Problem: Mobility Impaired (Adult and Pediatric)  Goal: *Acute Goals and Plan of Care (Insert Text)  Outcome: Progressing Towards Goal    Problem: Mobility Impaired (Adult and Pediatric)  Goal: *Acute Goals and Plan of Care (Insert Text)  Description: FUNCTIONAL STATUS PRIOR TO ADMISSION: Patient was independent for basic and instrumental ADLs. HOME SUPPORT PRIOR TO ADMISSION: The patient lived alone with family to provide assistance. Physical Therapy Goals  Initiated 4/6/2021  1. Patient will move from supine to sit and sit to supine  and roll side to side in bed with independence within 7 day(s). 2.  Patient will transfer from bed to chair and chair to bed with independence using the least restrictive device within 7 day(s). 3.  Patient will perform sit to stand with independence within 7 day(s). 4.  Patient will ambulate with independence for 50 feet with the least restrictive device within 7 day(s).    5.  Patient will ascend/descend 4 stairs with handrail(s) with modified independence within 7 day(s).

## 2021-04-07 NOTE — PROGRESS NOTES
Pt requested to go to [facility]. Information called and faxed to Ronna at [facility] for review.

## 2021-04-08 VITALS
DIASTOLIC BLOOD PRESSURE: 71 MMHG | SYSTOLIC BLOOD PRESSURE: 153 MMHG | TEMPERATURE: 97.7 F | HEIGHT: 68 IN | HEART RATE: 89 BPM | RESPIRATION RATE: 18 BRPM | WEIGHT: 198 LBS | BODY MASS INDEX: 30.01 KG/M2 | OXYGEN SATURATION: 89 %

## 2021-04-08 LAB
ANION GAP SERPL CALC-SCNC: 6 MMOL/L (ref 5–15)
BASOPHILS # BLD: 0 K/UL (ref 0–0.2)
BASOPHILS NFR BLD: 1 % (ref 0–2.5)
BUN SERPL-MCNC: 34 MG/DL (ref 6–20)
BUN/CREAT SERPL: 39 (ref 12–20)
CA-I BLD-MCNC: 8.6 MG/DL (ref 8.5–10.1)
CHLORIDE SERPL-SCNC: 103 MMOL/L (ref 97–108)
CO2 SERPL-SCNC: 31 MMOL/L (ref 21–32)
COVID-19 RAPID TEST, COVR: NOT DETECTED
CREAT SERPL-MCNC: 0.88 MG/DL (ref 0.55–1.02)
EOSINOPHIL # BLD: 0.1 K/UL (ref 0–0.7)
EOSINOPHIL NFR BLD: 2 % (ref 0.9–2.9)
ERYTHROCYTE [DISTWIDTH] IN BLOOD BY AUTOMATED COUNT: 14.2 % (ref 11.5–14.5)
GLUCOSE SERPL-MCNC: 111 MG/DL (ref 65–100)
HCT VFR BLD AUTO: 38.3 % (ref 36–46)
HGB BLD-MCNC: 12.4 G/DL (ref 13.5–17.5)
LYMPHOCYTES # BLD: 1.3 K/UL (ref 1–4.8)
LYMPHOCYTES NFR BLD: 21 % (ref 20.5–51.1)
MCH RBC QN AUTO: 29 PG (ref 31–34)
MCHC RBC AUTO-ENTMCNC: 32.3 G/DL (ref 31–36)
MCV RBC AUTO: 89.8 FL (ref 80–100)
MONOCYTES # BLD: 0.5 K/UL (ref 0.2–2.4)
MONOCYTES NFR BLD: 8 % (ref 1.7–9.3)
NEUTS SEG # BLD: 4.1 K/UL (ref 1.8–7.7)
NEUTS SEG NFR BLD: 68 % (ref 42–75)
NRBC # BLD: 0.01 K/UL
NRBC BLD-RTO: 0.1 PER 100 WBC
PLATELET # BLD AUTO: 165 K/UL (ref 150–400)
PMV BLD AUTO: 8.5 FL (ref 6.5–11.5)
POTASSIUM SERPL-SCNC: 3.4 MMOL/L (ref 3.5–5.1)
RBC # BLD AUTO: 4.27 M/UL (ref 4.5–5.9)
SODIUM SERPL-SCNC: 140 MMOL/L (ref 136–145)
SPECIMEN SOURCE: NORMAL
WBC # BLD AUTO: 6.1 K/UL (ref 4.4–11.3)

## 2021-04-08 PROCEDURE — 74011250636 HC RX REV CODE- 250/636: Performed by: HOSPITALIST

## 2021-04-08 PROCEDURE — 36415 COLL VENOUS BLD VENIPUNCTURE: CPT

## 2021-04-08 PROCEDURE — 80048 BASIC METABOLIC PNL TOTAL CA: CPT

## 2021-04-08 PROCEDURE — 74011250636 HC RX REV CODE- 250/636: Performed by: NURSE PRACTITIONER

## 2021-04-08 PROCEDURE — 74011250637 HC RX REV CODE- 250/637: Performed by: NURSE PRACTITIONER

## 2021-04-08 PROCEDURE — 85025 COMPLETE CBC W/AUTO DIFF WBC: CPT

## 2021-04-08 PROCEDURE — 87635 SARS-COV-2 COVID-19 AMP PRB: CPT

## 2021-04-08 PROCEDURE — 97530 THERAPEUTIC ACTIVITIES: CPT

## 2021-04-08 PROCEDURE — 74011250637 HC RX REV CODE- 250/637: Performed by: HOSPITALIST

## 2021-04-08 PROCEDURE — 74011000250 HC RX REV CODE- 250: Performed by: HOSPITALIST

## 2021-04-08 RX ORDER — METHOCARBAMOL 500 MG/1
500 TABLET, FILM COATED ORAL 3 TIMES DAILY
Qty: 15 TAB | Refills: 0 | Status: SHIPPED | OUTPATIENT
Start: 2021-04-08 | End: 2021-04-13

## 2021-04-08 RX ORDER — ACETAMINOPHEN 325 MG/1
650 TABLET ORAL
Qty: 30 TAB | Refills: 0 | Status: SHIPPED | OUTPATIENT
Start: 2021-04-08 | End: 2021-09-02 | Stop reason: ALTCHOICE

## 2021-04-08 RX ORDER — OXYCODONE HYDROCHLORIDE 5 MG/1
5 TABLET ORAL
Qty: 18 TAB | Refills: 0 | Status: SHIPPED | OUTPATIENT
Start: 2021-04-08 | End: 2021-04-11

## 2021-04-08 RX ORDER — ALPRAZOLAM 1 MG/1
1 TABLET ORAL
Qty: 10 TAB | Refills: 0 | Status: SHIPPED | OUTPATIENT
Start: 2021-04-08 | End: 2021-08-17

## 2021-04-08 RX ORDER — AMLODIPINE BESYLATE 10 MG/1
10 TABLET ORAL DAILY
Qty: 30 TAB | Refills: 0 | Status: SHIPPED | OUTPATIENT
Start: 2021-04-09 | End: 2022-03-30 | Stop reason: ALTCHOICE

## 2021-04-08 RX ORDER — FACIAL-BODY WIPES
10 EACH TOPICAL
Status: DISCONTINUED | OUTPATIENT
Start: 2021-04-08 | End: 2021-04-08 | Stop reason: HOSPADM

## 2021-04-08 RX ORDER — POTASSIUM CHLORIDE 20 MEQ/1
20 TABLET, EXTENDED RELEASE ORAL DAILY
Status: DISCONTINUED | OUTPATIENT
Start: 2021-04-08 | End: 2021-04-08 | Stop reason: HOSPADM

## 2021-04-08 RX ORDER — ALPRAZOLAM 1 MG/1
1 TABLET ORAL
Status: ON HOLD | COMMUNITY
End: 2021-04-08 | Stop reason: SDUPTHER

## 2021-04-08 RX ORDER — POLYETHYLENE GLYCOL 3350 17 G/17G
17 POWDER, FOR SOLUTION ORAL DAILY
Qty: 5 PACKET | Refills: 0 | Status: SHIPPED | OUTPATIENT
Start: 2021-04-08 | End: 2021-09-02 | Stop reason: ALTCHOICE

## 2021-04-08 RX ORDER — SIMETHICONE 80 MG
80 TABLET,CHEWABLE ORAL
Qty: 24 TAB | Refills: 0 | Status: SHIPPED | OUTPATIENT
Start: 2021-04-08 | End: 2021-09-02 | Stop reason: ALTCHOICE

## 2021-04-08 RX ADMIN — POTASSIUM CHLORIDE 20 MEQ: 1500 TABLET, EXTENDED RELEASE ORAL at 09:39

## 2021-04-08 RX ADMIN — MORPHINE SULFATE 2 MG: 2 INJECTION, SOLUTION INTRAMUSCULAR; INTRAVENOUS at 05:38

## 2021-04-08 RX ADMIN — ENOXAPARIN SODIUM 40 MG: 40 INJECTION SUBCUTANEOUS at 05:38

## 2021-04-08 RX ADMIN — AMLODIPINE BESYLATE 10 MG: 10 TABLET ORAL at 09:39

## 2021-04-08 RX ADMIN — ATORVASTATIN CALCIUM 40 MG: 40 TABLET, FILM COATED ORAL at 09:39

## 2021-04-08 RX ADMIN — METOPROLOL SUCCINATE 25 MG: 25 TABLET, FILM COATED, EXTENDED RELEASE ORAL at 09:39

## 2021-04-08 RX ADMIN — Medication 10 ML: at 05:38

## 2021-04-08 RX ADMIN — METHOCARBAMOL TABLETS 500 MG: 500 TABLET, COATED ORAL at 09:39

## 2021-04-08 RX ADMIN — FAMOTIDINE 20 MG: 10 INJECTION, SOLUTION INTRAVENOUS at 09:39

## 2021-04-08 NOTE — PROGRESS NOTES
PHYSICAL THERAPY TREATMENT  Patient: Cristina Galindo (46 y.o. female)  Date: 4/8/2021  Diagnosis: Fracture of L1 vertebra (HCC) [S32.019A]  Intractable pain [R52]  L1 vertebral fracture (HCC) [S32.019A] Fracture of L1 vertebra (HCC)       Precautions:  Fall risk   Chart, physical therapy assessment, plan of care and goals were reviewed. ASSESSMENT  Patient continues with skilled PT services and is progressing towards goals. Pt mod I with bed mobility due to increased time required. Pt required min assist for supine to sit transfer as well as sit to stand transfer. Pt able to stand for ~ 30 seconds at walker. Attempted to stand for longer, however pt wanted to sit due to stomach pain. Current Level of Function Impacting Discharge (mobility/balance): decreased strength and mobility     Other factors to consider for discharge: Pt's back pain is more tolerable. PLAN :  Patient continues to benefit from skilled intervention to address the above impairments. Continue treatment per established plan of care. to address goals. Recommendation for discharge: (in order for the patient to meet his/her long term goals)  Therapy up to 5 days/week in SNF setting or intensive home health therapy program    This discharge recommendation:  Has been made in collaboration with the attending provider and/or case management    IF patient discharges home will need the following DME: to be determined (TBD)       SUBJECTIVE:   Patient stated I don't want to stand up, but I will try.     OBJECTIVE DATA SUMMARY:   Critical Behavior:  Neurologic State: Alert, Eyes open spontaneously  Orientation Level: Appropriate for age  Cognition: Appropriate decision making  Safety/Judgement: Good awareness of safety precautions  Functional Mobility Training:  Bed Mobility:  Rolling: Modified independent  Supine to Sit: Minimum assistance;Setup  Sit to Supine: Modified independent  Scooting: Modified independent        Transfers:  Sit to Stand: Minimum assistance  Stand to Sit: Stand-by assistance                             Balance:  Sitting: With support; Intact  Standing: With support; Impaired  Ambulation/Gait Training:                                                        Stairs:              Treatment Session:   Pt was pleasant during treatment with less nausea and pain today. Pt mod I with bed mobility due to increased time to perform. Pt required min A for supine to sit and sit to stand transfer. Pt apprehensive while standing with unsteadiness noted on feet. Pt able to stand for 30 seconds before wanting to sit due to stomach pain. Overall improvements noted with mobility. Pain Rating:  Pt states she has back and stomach pain, but unable to give pain rating. Activity Tolerance:   Fair    After treatment patient left in no apparent distress:   Supine in bed and Call bell within reach    COMMUNICATION/COLLABORATION:   The patients plan of care was discussed with: Registered nurse, Physician, and Case management. David Robb, PT, DPT   Time Calculation: 16 mins          Problem: Mobility Impaired (Adult and Pediatric)  Goal: *Acute Goals and Plan of Care (Insert Text)  Description: FUNCTIONAL STATUS PRIOR TO ADMISSION: Patient was independent for basic and instrumental ADLs. HOME SUPPORT PRIOR TO ADMISSION: The patient lived alone with family to provide assistance. Physical Therapy Goals  Initiated 4/6/2021  1. Patient will move from supine to sit and sit to supine  and roll side to side in bed with independence within 7 day(s). 2.  Patient will transfer from bed to chair and chair to bed with independence using the least restrictive device within 7 day(s). 3.  Patient will perform sit to stand with independence within 7 day(s). 4.  Patient will ambulate with independence for 50 feet with the least restrictive device within 7 day(s).    5.  Patient will ascend/descend 4 stairs with  handrail(s) with modified independence within 7 day(s).        Outcome: Progressing towards

## 2021-04-08 NOTE — DISCHARGE SUMMARY
Physician Discharge Summary       Patient: Carol Swasnon MRN: [id number]     YOB: 1940  Age: [de-identified] y.o.   Sex: female    PCP: Geoffrey Pineda MD    Allergies: Incruse ellipta [umeclidinium] and Pcn [penicillins]    Admit date: 4/5/2021  Admitting Provider: Luis M Martinez MD    Discharge date: 4/8/2021  Discharging Provider: Luis M Martinez MD    * Admission Diagnoses:   Fracture of L1 vertebra ([facility] [S32.019A]  Intractable pain [R52]  L1 vertebral fracture ([facility] [S32.019A]      * Discharge Diagnoses:    Hospital Problems as of 4/8/2021 Date Reviewed: 2/15/2021          Codes Class Noted - Resolved POA    * (Principal) Fracture of L1 vertebra ([facility] ICD-10-CM: S32.019A  ICD-9-CM: 805.4  4/5/2021 - Present Unknown        Intractable pain ICD-10-CM: R52  ICD-9-CM: 780.96  4/5/2021 - Present Unknown        L1 vertebral fracture ([facility] ICD-10-CM: S32.019A  ICD-9-CM: 805.4  4/5/2021 - Present Unknown                * Hospital Course:   HPI:  Bozena Adan a [de-identified] y.o. female with PMH of Anemia, Anxiety, Breast CA s/p lumpectomy, Depression, GERD, COPD, HLD, HTN, CVA, and Mild Mitral Regurgitation who presented to ED this morning after GLF @ home.  Patient reports was standing up in living room when she lost balance and fell backwards/sideways into a wooden rack and then fell to floor striking her left temple on the way down.  Immediately had severe back pain and mild headache.  Called EMS and was transported to ED for further workup and treatment.  Pain remained intractable in ED and CT ABD/PELV found L1 fracture.  Dr. Paris Cruz was consulted and recommendations given and patient accepted by [facility] team for treatment of intractable pain, bracing, and PT referral. Ingrid Shipman my exam patient complains of mild headache, and moderate lower back pain.  She reports that she had dizziness after fall but no other focal deficits.  She is neurovascularly intact in BLE    Acute L1 fracture   -  noted transverse fracture in the midportion of the L1 vertebral body with minimal loss of height   -   Dr. Maryjane Rubinstein (ortho) has been consulted and discussed with him via [employer] on 4/6 and stated discharge and follow-up with him on outpatient  -Continue scheduled Tylenol and Robaxin at increased dose of  750 mg 3 times but then was decreased to 500 mg 3 times daily and was placed on as needed oxycodone rather than scheduled. Patient has done quite well work with therapy today and was able to get to standing position. Patient has not been requiring lumbar support and it was ordered only for comfort patient does not feel she needs it and there is no restrictions from orthopedic standpoint on physical therapy and the support is only for reasons of comfort and can be discontinued if patient does not wish to use.   Patient will be discharged on oxycodone 5 mg oral every 4 hours as needed for moderate to severe pain     Intractable pain  -Secondary to L1 vertebral body fracture  -Initial pain management ineffective and on 4/6 changed to oxycodone 5 mg oral every 6 hours along with Tylenol 650 mg oral every 6 hours and Robaxin increased to 750 mg oral 3 times a day and fentanyl 25 mcg patch initiated and continues on as needed IV morphine 2 mg every 3 hours as needed for severe pain  - patient doing well and will continue on oxycodone 5 mg every 4 hours as needed for moderate to severe pain has as needed Tylenol as well as well and continue Robaxin 500 mg oral 3 times daily and can be reevaluated for need in 1 week    Nausea and vomiting  -Patient had nausea and vomiting episode this morning secondary to intractable pain but also patient has underlying gastritis symptoms as patient takes pantoprazole and sacral fate and patient has improvement in symptoms after dose of simethicone which him proves her symptoms still no bowel movement so we will start bowel regimen today  -Patient still has constipation issues and did give rectal Dulcolax therapy prior to will continue MiraLAX daily as well as Colace twice daily and should continue to monitor as patient is having increased bloating secondary to the constipation and has had some relief with simethicone as needed tablets    Status post fall  -Mechanical fall no loss of consciousness but did hit her head and CT scan of the head was done after arrival to acute care and found to be negative also CTs of the cervical spine and thoracic spine were done and shows moderate degenerative disc disease but no further fractures noted  -Physical therapy evaluate the patient and continue to evaluate during inpatient stay and continues to recommend skilled rehab patient on day of discharge was able to get up to standing position will need continued therapy prior to being discharged home     Hypertension:  -Continue patient's home metoprolol succinate 25 mg twice daily      Hyperlipidemia  -Continue home atorvastatin. Patient is not allergic to atorvastatin and previously was on Crestor but patient is unsure of what or if any reaction occurred     GERD:  -Continue patient's home PPI    * Procedures:   * No surgery found *        Consults:   Ortho : Dr. Boubacar Bailey consulted by the ER and recommended  pain control and no restrictions and lumbar support as needed for comfort but not required    Physical Therapy   ASSESSMENT  Patient continues with skilled PT services and is progressing towards goals. Pt mod I with bed mobility due to increased time required. Pt required min assist for supine to sit transfer as well as sit to stand transfer. Pt able to stand for ~ 30 seconds at walker.  Attempted to stand for longer, however pt wanted to sit due to stomach pain.       Current Level of Function Impacting Discharge (mobility/balance): decreased strength and mobility      Other factors to consider for discharge: Pt's back pain is more tolerable.           PLAN :  Patient continues to benefit from skilled intervention to address the above impairments. Continue treatment per established plan of care. to address goals.     Recommendation for discharge: (in order for the patient to meet his/her long term goals)  Therapy up to 5 days/week in SNF setting or intensive home health therapy program     This discharge recommendation:  Has been made in collaboration with the attending provider and/or case management     IF patient discharges home will need the following DME: to be determined (TBD)         Vitals Last 24 Hours:  Patient Vitals for the past 24 hrs:   Temp Pulse Resp BP SpO2   04/08/21 0955 -- -- -- -- 94 %   04/08/21 0746 98.2 °F (36.8 °C) 73 18 133/61 --   04/08/21 0712 98.2 °F (36.8 °C) 73 18 133/61 94 %   04/08/21 0508 98.2 °F (36.8 °C) 78 18 130/78 94 %   04/08/21 0030 98.3 °F (36.8 °C) 79 18 (!) 143/67 94 %   04/07/21 2110 98.2 °F (36.8 °C) 88 18 (!) 156/74 93 %   04/07/21 1547 98.4 °F (36.9 °C) 89 17 (!) 157/79 93 %   04/07/21 1114 97.9 °F (36.6 °C) 73 16 (!) 155/72 93 %        Discharge Exam:  General: Alert, cooperative, no distress, appears stated age. Head:  Normocephalic, without obvious abnormality, atraumatic. Eyes:  Conjunctivae/corneas clear. Pupils equal, round, reactive to light. Extraocular movements intact. Lungs:  Clear to auscultation bilaterally. no wheeze, rales, crackles, rhonchi   Chest wall: No tenderness or deformity. Heart:  Regular rate and rhythm, S1, S2 normal, no murmur, click, rub or gallop. Abdomen:  Soft, non-tender. Bowel sounds normal. No masses,  No organomegaly. Extremities: Extremities normal, atraumatic, no cyanosis or edema. Pulses: 2+ and symmetric all extremities. Skin: Skin color, texture, turgor normal. No rashes or lesions  Neurologic: Awake, Alert, oriented.  No obvious gross sensory or motor deficits    Labs:  Recent Results (from the past 24 hour(s))   CBC WITH AUTOMATED DIFF    Collection Time: 04/08/21  5:22 AM   Result Value Ref Range    WBC 6.1 4.4 - 11.3 K/uL    RBC 4.27 (L) 4.50 - 5.90 M/uL    HGB 12.4 (L) 13.5 - 17.5 g/dL    HCT 38.3 36 - 46 %    MCV 89.8 80 - 100 FL    MCH 29.0 (L) 31 - 34 PG    MCHC 32.3 31.0 - 36.0 g/dL    RDW 14.2 11.5 - 14.5 %    PLATELET 456 320 - 677 K/uL    MPV 8.5 6.5 - 11.5 FL    NRBC 0.1  WBC    ABSOLUTE NRBC 0.01 K/uL    NEUTROPHILS 68 42 - 75 %    LYMPHOCYTES 21 20.5 - 51.1 %    MONOCYTES 8 1.7 - 9.3 %    EOSINOPHILS 2 0.9 - 2.9 %    BASOPHILS 1 0.0 - 2.5 %    ABS. NEUTROPHILS 4.1 1.8 - 7.7 K/UL    ABS. LYMPHOCYTES 1.3 1.0 - 4.8 K/UL    ABS. MONOCYTES 0.5 0.2 - 2.4 K/UL    ABS. EOSINOPHILS 0.1 0.0 - 0.7 K/UL    ABS. BASOPHILS 0.0 0.0 - 0.2 K/UL   METABOLIC PANEL, BASIC    Collection Time: 04/08/21  5:22 AM   Result Value Ref Range    Sodium 140 136 - 145 mmol/L    Potassium 3.4 (L) 3.5 - 5.1 mmol/L    Chloride 103 97 - 108 mmol/L    CO2 31 21 - 32 mmol/L    Anion gap 6 5 - 15 mmol/L    Glucose 111 (H) 65 - 100 mg/dL    BUN 34 (H) 6 - 20 mg/dL    Creatinine 0.88 0.55 - 1.02 mg/dL    BUN/Creatinine ratio 39 (H) 12 - 20      GFR est AA >60 >60 ml/min/1.73m2    GFR est non-AA >60 >60 ml/min/1.73m2    Calcium 8.6 8.5 - 10.1 mg/dL   COVID-19 RAPID TEST    Collection Time: 04/08/21  9:48 AM   Result Value Ref Range    Specimen source Nasopharyngeal      COVID-19 rapid test Not Detected Not Detected           Imaging:  Ct Head Wo Cont    Result Date: 4/5/2021  Age-appropriate atrophy. No acute findings. Ct Abd Pelv Wo Cont    Result Date: 4/5/2021  Fracture of L1 with minimal loss of height. Colonic diverticulosis without acute diverticulitis. Other findings as above.          * Discharge Condition: good  * Disposition: SNF/LTC   PT outpatient treatment with fall precautions  Lumbar support is only needed for patient comfort  -Follow-up with orthopedics in 1 week      Discharge Medications:  Current Discharge Medication List      START taking these medications    Details   acetaminophen (TYLENOL) 325 mg tablet Take 2 Tabs by mouth every four (4) hours as needed for Fever. Qty: 30 Tab, Refills: 0      oxyCODONE IR (ROXICODONE) 5 mg immediate release tablet Take 1 Tab by mouth every four (4) hours as needed for Pain (moderate to severe pain) for up to 3 days. Max Daily Amount: 30 mg.  Qty: 18 Tab, Refills: 0    Associated Diagnoses: Closed wedge compression fracture of L1 vertebra, initial encounter ([facility])      polyethylene glycol (MIRALAX) 17 gram packet Take 1 Packet by mouth daily. Qty: 5 Packet, Refills: 0      simethicone (MYLICON) 80 mg chewable tablet Take 1 Tab by mouth four (4) times daily as needed for GI Pain. Qty: 24 Tab, Refills: 0      methocarbamoL (ROBAXIN) 500 mg tablet Take 1 Tab by mouth three (3) times daily for 5 days. Qty: 15 Tab, Refills: 0         CONTINUE these medications which have CHANGED    Details   amLODIPine (NORVASC) 10 mg tablet Take 1 Tab by mouth daily. Qty: 30 Tab, Refills: 0      ALPRAZolam (Xanax) 1 mg tablet Take 1 Tab by mouth two (2) times daily as needed for Anxiety. Max Daily Amount: 2 mg. Qty: 10 Tab, Refills: 0    Associated Diagnoses: Anxiety         CONTINUE these medications which have NOT CHANGED    Details   metoprolol succinate (TOPROL-XL) 25 mg XL tablet Take 1 Tab by mouth two (2) times a day. Take two tablets daily  Qty: 180 Tab, Refills: 1    Associated Diagnoses: Essential hypertension      omeprazole (PRILOSEC) 40 mg capsule TAKE 1 CAPSULE BY MOUTH EVERY DAY  Qty: 90 Cap, Refills: 1    Associated Diagnoses: Gastroesophageal reflux disease without esophagitis      atorvastatin (LIPITOR) 40 mg tablet Take 1 Tab by mouth daily. Qty: 90 Tab, Refills: 0    Associated Diagnoses: Hyperlipidemia, unspecified hyperlipidemia type      melatonin 3 mg tablet Take 3 mg by mouth nightly as needed for Insomnia. cholecalciferol (Vitamin D3) 25 mcg (1,000 unit) cap Take 1,000 Units by mouth daily.       albuterol (PROVENTIL HFA, VENTOLIN HFA, PROAIR HFA) 90 mcg/actuation inhaler Take 2 Puffs by inhalation every six (6) hours as needed for Wheezing. Qty: 1 Inhaler, Refills: 3    Associated Diagnoses: Mild intermittent asthma without complication      alendronate (FOSAMAX) 35 mg tablet TAKE 1 TABLET WEEKLY ON AN EMPTY STOMACH 30-60 MIN BEFORE BREAKFAST  Qty: 12 Tab, Refills: 1      aspirin delayed-release 81 mg tablet Take  by mouth daily. * Follow-up Care/Patient Instructions: Activity: Activity as tolerated  Diet: GI soft cardiac diet      Follow-up Information     Follow up With Specialties Details Why Contact Info    Kolleen Simmonds, MD Family Medicine On 4/23/2021 @ 9:30 [address] and [address] [place] [address] In 3 days  [id number] [place] [address]. [address]  [phone]    Ani Ceballos MD Orthopedic Surgery In 1 week  [address] [facility] [phone]          Patient's rapid Covid test was found to be negative  Patient states she had the first COVID-19 vaccine shot and is scheduled to get second around 28 April    Spent 35 minutes evaluting and coordinating patient care and discharging patient to skilled rehab at [facility] and rehab of which >50% was spent coordinating and counseling.      Signed:  Chan Gillette MD  4/8/2021  10:47 AM

## 2021-04-08 NOTE — PROGRESS NOTES
Problem: Falls - Risk of  Goal: *Absence of Falls  Description: Document Burrell Canavan Fall Risk and appropriate interventions in the flowsheet.   4/8/2021 1102 by Yumiko Jordan, RN  Outcome: Resolved/Met  Note: Fall Risk Interventions:            Medication Interventions: Bed/chair exit alarm    Elimination Interventions: Call light in reach    History of Falls Interventions: Door open when patient unattended, Bed/chair exit alarm      4/8/2021 0744 by Yumiko Jordan RN  Outcome: Progressing Towards Goal  Note: Fall Risk Interventions:            Medication Interventions: Bed/chair exit alarm    Elimination Interventions: Call light in reach, Bed/chair exit alarm    History of Falls Interventions: Door open when patient unattended

## 2021-04-08 NOTE — PROGRESS NOTES
Called [facility] to arrange transport for patient. Was told that pt does not have a transport benefit with her Medicaid. Wheelchair transport arranged to [facility]. ETA 1300.

## 2021-04-08 NOTE — PROGRESS NOTES
Problem: Falls - Risk of  Goal: *Absence of Falls  Description: Document Jerman Shove Fall Risk and appropriate interventions in the flowsheet.   Outcome: Progressing Towards Goal  Note: Fall Risk Interventions:            Medication Interventions: Bed/chair exit alarm    Elimination Interventions: Call light in reach, Bed/chair exit alarm    History of Falls Interventions: Door open when patient unattended

## 2021-04-14 ENCOUNTER — OFFICE VISIT (OUTPATIENT)
Dept: ORTHOPEDIC SURGERY | Age: 81
End: 2021-04-14
Payer: MEDICARE

## 2021-04-14 VITALS — WEIGHT: 198 LBS | BODY MASS INDEX: 30.01 KG/M2 | HEIGHT: 68 IN

## 2021-04-14 DIAGNOSIS — M54.50 LOW BACK PAIN, UNSPECIFIED BACK PAIN LATERALITY, UNSPECIFIED CHRONICITY, UNSPECIFIED WHETHER SCIATICA PRESENT: Primary | ICD-10-CM

## 2021-04-14 DIAGNOSIS — S32.010A COMPRESSION FRACTURE OF L1 VERTEBRA, INITIAL ENCOUNTER (HCC): ICD-10-CM

## 2021-04-14 PROCEDURE — 22310 CLOSED TX VERT FX W/O MANJ: CPT | Performed by: ORTHOPAEDIC SURGERY

## 2021-04-14 PROCEDURE — G8399 PT W/DXA RESULTS DOCUMENT: HCPCS | Performed by: ORTHOPAEDIC SURGERY

## 2021-04-14 PROCEDURE — 99203 OFFICE O/P NEW LOW 30 MIN: CPT | Performed by: ORTHOPAEDIC SURGERY

## 2021-04-14 PROCEDURE — 1090F PRES/ABSN URINE INCON ASSESS: CPT | Performed by: ORTHOPAEDIC SURGERY

## 2021-04-14 PROCEDURE — 1100F PTFALLS ASSESS-DOCD GE2>/YR: CPT | Performed by: ORTHOPAEDIC SURGERY

## 2021-04-14 PROCEDURE — 1111F DSCHRG MED/CURRENT MED MERGE: CPT | Performed by: ORTHOPAEDIC SURGERY

## 2021-04-14 PROCEDURE — G8417 CALC BMI ABV UP PARAM F/U: HCPCS | Performed by: ORTHOPAEDIC SURGERY

## 2021-04-14 PROCEDURE — 3288F FALL RISK ASSESSMENT DOCD: CPT | Performed by: ORTHOPAEDIC SURGERY

## 2021-04-14 PROCEDURE — L0627 LO SAG RI AN/POS PNL PRE CST: HCPCS | Performed by: ORTHOPAEDIC SURGERY

## 2021-04-14 PROCEDURE — G9717 DOC PT DX DEP/BP F/U NT REQ: HCPCS | Performed by: ORTHOPAEDIC SURGERY

## 2021-04-14 PROCEDURE — G8427 DOCREV CUR MEDS BY ELIG CLIN: HCPCS | Performed by: ORTHOPAEDIC SURGERY

## 2021-04-14 PROCEDURE — G8536 NO DOC ELDER MAL SCRN: HCPCS | Performed by: ORTHOPAEDIC SURGERY

## 2021-04-14 PROCEDURE — G8756 NO BP MEASURE DOC: HCPCS | Performed by: ORTHOPAEDIC SURGERY

## 2021-04-14 NOTE — PROGRESS NOTES
Name: Kristi Catalan    : 1940     Service Dept: [address] [place] [address] and Sports Medicine    Patient's Pharmacies:    [facility]/pharmacy #[id number] - Jarred Matthews[address] [place] [id number]  Phone: [phone] Fax: [phone]    [facility], [facility]  [address]  [address] [place] [address],[address] [address]  Phone: [phone] Fax: [phone]       Chief Complaint   Patient presents with    Back Pain        Visit Vitals  Ht 5' 8\" (1.727 m)   Wt 198 lb (89.8 kg)   BMI 30.11 kg/m²      Allergies   Allergen Reactions    Incruse Ellipta [Umeclidinium] Other (comments)     Throat felt funny and red on the outside on neck area.  Pcn [Penicillins] Unknown (comments)     States was in hospital a long time ago (in her 19's). Does'nt remember reaction she had. Current Outpatient Medications   Medication Sig Dispense Refill    amLODIPine (NORVASC) 10 mg tablet Take 1 Tab by mouth daily. 30 Tab 0    acetaminophen (TYLENOL) 325 mg tablet Take 2 Tabs by mouth every four (4) hours as needed for Fever. 30 Tab 0    polyethylene glycol (MIRALAX) 17 gram packet Take 1 Packet by mouth daily. 5 Packet 0    simethicone (MYLICON) 80 mg chewable tablet Take 1 Tab by mouth four (4) times daily as needed for GI Pain. 24 Tab 0    ALPRAZolam (Xanax) 1 mg tablet Take 1 Tab by mouth two (2) times daily as needed for Anxiety. Max Daily Amount: 2 mg. 10 Tab 0    metoprolol succinate (TOPROL-XL) 25 mg XL tablet Take 1 Tab by mouth two (2) times a day. Take two tablets daily 180 Tab 1    omeprazole (PRILOSEC) 40 mg capsule TAKE 1 CAPSULE BY MOUTH EVERY DAY 90 Cap 1    atorvastatin (LIPITOR) 40 mg tablet Take 1 Tab by mouth daily. 90 Tab 0    melatonin 3 mg tablet Take 3 mg by mouth nightly as needed for Insomnia.  cholecalciferol (Vitamin D3) 25 mcg (1,000 unit) cap Take 1,000 Units by mouth daily.       albuterol (PROVENTIL HFA, VENTOLIN HFA, PROAIR HFA) 90 mcg/actuation inhaler Take 2 Puffs by inhalation every six (6) hours as needed for Wheezing. 1 Inhaler 3    alendronate (FOSAMAX) 35 mg tablet TAKE 1 TABLET WEEKLY ON AN EMPTY STOMACH 30-60 MIN BEFORE BREAKFAST 12 Tab 1    aspirin delayed-release 81 mg tablet Take  by mouth daily.         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    GERD (gastroesophageal reflux disease) K21.9    Obstructive sleep apnea syndrome G47.33    Fracture of L1 vertebra ([facility]) S32.019A    Intractable pain R52    L1 vertebral fracture ([facility] S32.019A      Family History   Problem Relation Age of Onset    Hypertension Mother     Hypertension Sister     Alzheimer Sister     Cancer Brother       Social History     Socioeconomic History    Marital status:      Spouse name: Not on file    Number of children: 3    Years of education: Not on file    Highest education level: High school graduate   Social Needs    Financial resource strain: Not on file    Food insecurity     Worry: Never true     Inability: Never true    Transportation needs     Medical: No     Non-medical: No   Tobacco Use    Smoking status: Former Smoker    Smokeless tobacco: Never Used   Substance and Sexual Activity    Alcohol use: Not Currently    Drug use: Never    Sexual activity: Not Currently   Other Topics Concern      Past Surgical History:   Procedure Laterality Date    HX BREAST LUMPECTOMY  1996    HX TUBAL LIGATION      OR REVISION AMBROSE-IMPLANT CAPSULE BREAST        Past Medical History:   Diagnosis Date    Anemia 6/24/2020    Anxiety 6/24/2020    Arthritis 6/24/2020    Breast cancer ([facility] 6/24/2020    Depression 6/24/2020    GERD (gastroesophageal reflux disease) 6/24/2020    Heart disease 6/24/2020    Hyperlipidemia 6/24/2020    Hypertension 6/24/2020    Pulmonary disease 6/24/2020    Sleep disorder 6/24/2020    Stroke (cerebrum) ([facility] 6/24/2020 2012        I have reviewed and agree with [facility] and ROS and intake form in chart and the record furthermore I have reviewed prior medical record(s) regarding this patients care during this appointment. Review of Systems:   Patient is a pleasant appearing individual, appropriately dressed, well hydrated, well nourished, who is alert, appropriately oriented for age, and in no acute distress with a wheelchair bound gait and normal affect who does not appear to be in any significant pain. Physical Exam:  The patient's back is neurovascularly intact and appears to have good symmetry on exam with no muscle atrophy noted. Normal curvature of the spine with positive tenderness to palpation. Decreased range of motion in all planes secondary to pain but normal strength. No rashes, echymosis or other skin lesions noted. The patients bilateral lower extremities are grossly neurovascularly intact with good cap refill, full range of motion and strength. No swelling, echymosis or instability noted. Negative straight leg raise, negative cardozo's and negative lachman's. No tenderness to palpation. No foot drop present and patient has a normal gait. Please note that a DME was provided from our office and fitted to an appropriate size. DME provided will help decrease soft tissue swelling, assist with stabilization, and decrease pain with immobilization. Encounter Diagnoses     ICD-10-CM ICD-9-CM   1. Low back pain, unspecified back pain laterality, unspecified chronicity, unspecified whether sciatica present  M54.5 724.2   2.  Compression fracture of L1 vertebra, initial encounter ([facility]  S32.010A 805.4       HPI:  The patient is here with a chief complaint of low back pain, sharp throbbing pain. The patient fell on 4/8/2021. Pain is 6/10. ROS:  10-point review of systems is positive for nighttime pain. CT scan is positive for L1 compression fracture with no significant retropulsion. Assessment/Plan:  Plan at this point, we will get her fitted with lumbar brace today, fracture treatment for L1 compression fracture. We will see her back in 4 weeks and repeat x-rays of the lumbar spine and go from there. As part of continued conservative pain management options the patient was advised to utilize Tylenol or OTC NSAIDS as long as it is not medically contraindicated. Return to Office: Follow-up and Dispositions    · Return in about 4 weeks (around 5/12/2021) for w/ xrays. Scribed by Maria Esther White LPN as dictated by [facility] SUZANNE Renee MD.  Documentation True and Accepted Yaw Renee MD

## 2021-04-14 NOTE — LETTER
RX/DWO/POD 
DOS: 4/14/2021 Jo-Ann Huitron          1940                                                              Reyna Huang NPI# [id number] Wrist                     Foot/Ankle                         Knee Wrist Splint            Cam Boot                         Knee Immobilizer Thumb Spica         Lace Up Ankle Strap       J Sleeve Night Time Splint             T- Scope ROM Brace Short Runner OA Brace SHOULDER Sling Sling w/ Abduction Pillow ELBOW Elbow T-Scope ROM Brace Back Back Brace CRUTCHES Left    Right    __________ Size              IDC 10 Code:____________ I hereby acknowledge receipt of the above listed equipment. I acknowledge that the equipment is in good working order. I have received instructions on safe and proper use of the equipment, including cleaning and maintenance requirements, to my complete satisfaction. I also understand that this product is not able to be returned and is non refundable. I hereby request that payment of authorized Medicare/ third party insurance benefits be made on my behalf of [address] [place] [address] for assigned claims for any authorized equipment/product furnished by [facility]. Having read the foregoing terms and conditions of the agreement on this page , I do hereby agree to be bound thereby.  
 
 
_______________________________________         ____________________________ Patient/Legal Guardian Signature         Date            Representative Name

## 2021-04-14 NOTE — PATIENT INSTRUCTIONS
The patient's back is neurovascularly intact and appears to have good symmetry on exam with no muscle atrophy noted. Normal curvature of the spine with positive tenderness to palpation. Decreased range of motion in all planes secondary to pain but normal strength. No rashes, echymosis or other skin lesions noted. The patients bilateral lower extremities are grossly neurovascularly intact with good cap refill, full range of motion and strength. No swelling, echymosis or instability noted. Negative straight leg raise, negative cardozo's and negative lachman's. No tenderness to palpation. No foot drop present and patient has a normal gait.

## 2021-05-12 DIAGNOSIS — M54.50 LOW BACK PAIN, UNSPECIFIED BACK PAIN LATERALITY, UNSPECIFIED CHRONICITY, UNSPECIFIED WHETHER SCIATICA PRESENT: Primary | ICD-10-CM

## 2021-05-26 ENCOUNTER — OFFICE VISIT (OUTPATIENT)
Dept: ORTHOPEDIC SURGERY | Age: 81
End: 2021-05-26
Payer: MEDICARE

## 2021-05-26 DIAGNOSIS — M54.50 LOW BACK PAIN, UNSPECIFIED BACK PAIN LATERALITY, UNSPECIFIED CHRONICITY, UNSPECIFIED WHETHER SCIATICA PRESENT: Primary | ICD-10-CM

## 2021-05-26 PROCEDURE — 99024 POSTOP FOLLOW-UP VISIT: CPT | Performed by: ORTHOPAEDIC SURGERY

## 2021-05-26 NOTE — PROGRESS NOTES
Name: Claudia Webster    : 1940     Service Dept: [address] [place] [address] and Sports Medicine    Patient's Pharmacies:    [facility]/pharmacy #[id number] - Bridgett Zimmerman  [address] [place] [address] [address] [address]  Phone: [phone] Fax: [address], [address] [place] [address] Road  Ronald [place] [address]  Phone: [phone] Fax: [phone]       Chief Complaint   Patient presents with    Back Pain        There were no vitals taken for this visit. Allergies   Allergen Reactions    Incruse Ellipta [Umeclidinium] Other (comments)     Throat felt funny and red on the outside on neck area.  Pcn [Penicillins] Unknown (comments)     States was in hospital a long time ago (in her 19's). Does'nt remember reaction she had. Current Outpatient Medications   Medication Sig Dispense Refill    amLODIPine (NORVASC) 10 mg tablet Take 1 Tab by mouth daily. 30 Tab 0    acetaminophen (TYLENOL) 325 mg tablet Take 2 Tabs by mouth every four (4) hours as needed for Fever. 30 Tab 0    polyethylene glycol (MIRALAX) 17 gram packet Take 1 Packet by mouth daily. 5 Packet 0    simethicone (MYLICON) 80 mg chewable tablet Take 1 Tab by mouth four (4) times daily as needed for GI Pain. 24 Tab 0    ALPRAZolam (Xanax) 1 mg tablet Take 1 Tab by mouth two (2) times daily as needed for Anxiety. Max Daily Amount: 2 mg. 10 Tab 0    metoprolol succinate (TOPROL-XL) 25 mg XL tablet Take 1 Tab by mouth two (2) times a day. Take two tablets daily 180 Tab 1    omeprazole (PRILOSEC) 40 mg capsule TAKE 1 CAPSULE BY MOUTH EVERY DAY 90 Cap 1    atorvastatin (LIPITOR) 40 mg tablet Take 1 Tab by mouth daily. 90 Tab 0    melatonin 3 mg tablet Take 3 mg by mouth nightly as needed for Insomnia.  cholecalciferol (Vitamin D3) 25 mcg (1,000 unit) cap Take 1,000 Units by mouth daily.       albuterol (PROVENTIL HFA, VENTOLIN HFA, PROAIR HFA) 90 mcg/actuation inhaler Take 2 Puffs by inhalation every six (6) hours as needed for Wheezing. 1 Inhaler 3    alendronate (FOSAMAX) 35 mg tablet TAKE 1 TABLET WEEKLY ON AN EMPTY STOMACH 30-60 MIN BEFORE BREAKFAST 12 Tab 1    aspirin delayed-release 81 mg tablet Take  by mouth daily. 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    GERD (gastroesophageal reflux disease) K21.9    Obstructive sleep apnea syndrome G47.33    Fracture of L1 vertebra ([facility]) S32.019A    Intractable pain R52    L1 vertebral fracture ([facility] S32.019A      Family History   Problem Relation Age of Onset    Hypertension Mother     Hypertension Sister     Alzheimer Sister     Cancer Brother       Social History     Socioeconomic History    Marital status:      Spouse name: Not on file    Number of children: 3    Years of education: Not on file    Highest education level: High school graduate   Tobacco Use    Smoking status: Former Smoker    Smokeless tobacco: Never Used   Substance and Sexual Activity    Alcohol use: Not Currently    Drug use: Never    Sexual activity: Not Currently   Other Topics Concern     Social Determinants of Health     Financial Resource Strain:     Difficulty of Paying Living Expenses:    Food Insecurity: No Food Insecurity    Worried About Running Out of Food in the Last Year: Never true    Elena of Food in the Last Year: Never true   Transportation Needs: No Transportation Needs    Lack of Transportation (Medical): No    Lack of Transportation (Non-Medical):  No   Physical Activity:     Days of Exercise per Week:     Minutes of Exercise per Session:    Stress:     Feeling of Stress :    Social Connections:  Frequency of Communication with Friends and Family:     Frequency of Social Gatherings with Friends and Family:     Attends Restorationism Services:     Active Member of Clubs or Organizations:     Attends Club or Organization Meetings:     Marital Status:       Past Surgical History:   Procedure Laterality Date    HX BREAST LUMPECTOMY  1996    HX TUBAL LIGATION      VA REVISION AMBROSE-IMPLANT CAPSULE BREAST        Past Medical History:   Diagnosis Date    Anemia 6/24/2020    Anxiety 6/24/2020    Arthritis 6/24/2020    [facility] cancer ([facility] 6/24/2020    Depression 6/24/2020    GERD (gastroesophageal reflux disease) 6/24/2020    Heart disease 6/24/2020    Hyperlipidemia 6/24/2020    Hypertension 6/24/2020    Pulmonary disease 6/24/2020    Sleep disorder 6/24/2020    Stroke (cerebrum) ([facility] 6/24/2020 2012        I have reviewed and agree with [address] [place] [address] Nw and ROS and intake form in chart and the record furthermore I have reviewed prior medical record(s) regarding this patients care during this appointment. Review of Systems:   Patient is a pleasant appearing individual, appropriately dressed, well hydrated, well nourished, who is alert, appropriately oriented for age, and in no acute distress with a normal gait and normal affect who does not appear to be in any significant pain. Physical Exam:  The patient's back is neurovascularly intact and appears to have good symmetry on exam with no muscle atrophy noted. Normal curvature of the spine with positive tenderness to palpation. Decreased range of motion in all planes secondary to pain but normal strength. No rashes, echymosis or other skin lesions noted. The patients bilateral lower extremities are grossly neurovascularly intact with good cap refill, full range of motion and strength. No swelling, echymosis or instability noted. Negative straight leg raise, negative cardozo's and negative lachman's. No tenderness to palpation.  No foot drop present and patient has a normal gait. Encounter Diagnoses     ICD-10-CM ICD-9-CM   1. Low back pain, unspecified back pain laterality, unspecified chronicity, unspecified whether sciatica present  M54.5 724.2       HPI:  The patient is here with a chief complaint of low back pain since 4/14/2021, doing well. X-rays show well-aligned compression fracture with good alignment. Assessment/Plan:  Plan at this point, activities as tolerated, weightbearing started. No restrictions from my standpoint. We will see the patient back as needed. If the patient gets worse, she is to give me a call. As part of continued conservative pain management options the patient was advised to utilize Tylenol or OTC NSAIDS as long as it is not medically contraindicated. Return to Office: Follow-up and Dispositions    · Return if symptoms worsen or fail to improve. Scribed by Margo Harris LPN as dictated by [facility] SUZANNE Easton MD.  Documentation True and Accepted [facility] SUZANNE Easton MD

## 2021-05-29 DIAGNOSIS — E78.5 HYPERLIPIDEMIA, UNSPECIFIED HYPERLIPIDEMIA TYPE: ICD-10-CM

## 2021-06-06 RX ORDER — ATORVASTATIN CALCIUM 40 MG/1
TABLET, FILM COATED ORAL
Qty: 90 TABLET | Refills: 0 | Status: SHIPPED | OUTPATIENT
Start: 2021-06-06 | End: 2022-03-30 | Stop reason: SDUPTHER

## 2021-07-28 ENCOUNTER — HOSPITAL ENCOUNTER (OUTPATIENT)
Dept: VASCULAR SURGERY | Age: 81
Discharge: HOME | End: 2021-07-28
Attending: NURSE PRACTITIONER
Payer: MEDICARE

## 2021-07-28 DIAGNOSIS — R01.1 MURMUR: ICD-10-CM

## 2021-07-28 LAB
ECHO AO ROOT DIAM: 3.28 CM
ECHO AV MEAN GRADIENT: 6.63 MMHG
ECHO AV MEAN VELOCITY: 119.28 CM/S
ECHO AV PEAK GRADIENT: 15.34 MMHG
ECHO AV PEAK VELOCITY: 195.84 CM/S
ECHO AV VTI: 35.28 CM
ECHO EST RA PRESSURE: 3 MMHG
ECHO LA VOL 2C: 46.18 ML (ref 22–52)
ECHO LA VOL 4C: 69.88 ML (ref 22–52)
ECHO LA VOL BP: 63.16 ML (ref 22–52)
ECHO LV EDV A2C: 96.6 ML
ECHO LV EDV BP: 64.24 ML (ref 56–104)
ECHO LV EJECTION FRACTION A2C: 69 PERCENT
ECHO LV EJECTION FRACTION A2C: 69 PERCENT
ECHO LV EJECTION FRACTION A4C: 62 PERCENT
ECHO LV EJECTION FRACTION BIPLANE: 65.2 PERCENT (ref 55–100)
ECHO LV ESV A2C: 18.2 ML
ECHO LV ESV BP: 22.33 ML (ref 19–49)
ECHO LV INTERNAL DIMENSION DIASTOLIC: 4.59 CM (ref 3.9–5.3)
ECHO LV INTERNAL DIMENSION SYSTOLIC: 3.26 CM
ECHO LV IVSD: 1.03 CM (ref 0.6–0.9)
ECHO LV MASS 2D: 152.9 G (ref 67–162)
ECHO LV POSTERIOR WALL DIASTOLIC: 0.92 CM (ref 0.6–0.9)
ECHO LVOT PEAK GRADIENT: 7.35 MMHG
ECHO LVOT PEAK VELOCITY: 135.58 CM/S
ECHO LVOT SV: 61.7 ML
ECHO LVOT SV: 61.7 ML
ECHO LVOT VTI: 26.54 CM
ECHO MV A VELOCITY: 85.86 CM/S
ECHO MV AREA PHT: 4.3 CM2
ECHO MV AREA PHT: 4.3 CM2
ECHO MV E DECELERATION TIME (DT): 176.61 MS
ECHO MV E VELOCITY: 88.32 CM/S
ECHO MV E/A RATIO: 1.03
ECHO MV PRESSURE HALF TIME (PHT): 51.22 MS
ECHO RA AREA 4C: 17 CM2
ECHO RIGHT VENTRICULAR SYSTOLIC PRESSURE (RVSP): 36 MMHG
ECHO RV INTERNAL DIMENSION: 2.39 CM
ECHO TV REGURGITANT MAX VELOCITY: 288.84 CM/S
ECHO TV REGURGITANT PEAK GRADIENT: 33.37 MMHG
LVOT MG: 3.53 MMHG

## 2021-07-28 PROCEDURE — 93306 TTE W/DOPPLER COMPLETE: CPT

## 2021-08-17 DIAGNOSIS — F41.9 ANXIETY: ICD-10-CM

## 2021-08-17 RX ORDER — ALPRAZOLAM 1 MG/1
TABLET ORAL
Qty: 60 TABLET | Refills: 0 | Status: SHIPPED | OUTPATIENT
Start: 2021-08-17 | End: 2021-09-21

## 2021-09-02 ENCOUNTER — OFFICE VISIT (OUTPATIENT)
Dept: PRIMARY CARE CLINIC | Age: 81
End: 2021-09-02
Payer: MEDICARE

## 2021-09-02 VITALS
OXYGEN SATURATION: 98 % | RESPIRATION RATE: 16 BRPM | HEART RATE: 76 BPM | HEIGHT: 66 IN | WEIGHT: 195.25 LBS | BODY MASS INDEX: 31.38 KG/M2 | DIASTOLIC BLOOD PRESSURE: 59 MMHG | SYSTOLIC BLOOD PRESSURE: 134 MMHG | TEMPERATURE: 98.8 F

## 2021-09-02 DIAGNOSIS — E78.2 MIXED HYPERLIPIDEMIA: ICD-10-CM

## 2021-09-02 DIAGNOSIS — F41.9 ANXIETY: ICD-10-CM

## 2021-09-02 DIAGNOSIS — Z00.00 ENCOUNTER FOR ANNUAL WELLNESS VISIT (AWV) IN MEDICARE PATIENT: Primary | ICD-10-CM

## 2021-09-02 DIAGNOSIS — K21.9 GASTROESOPHAGEAL REFLUX DISEASE WITHOUT ESOPHAGITIS: ICD-10-CM

## 2021-09-02 DIAGNOSIS — I10 ESSENTIAL HYPERTENSION: ICD-10-CM

## 2021-09-02 DIAGNOSIS — M81.0 AGE-RELATED OSTEOPOROSIS WITHOUT CURRENT PATHOLOGICAL FRACTURE: ICD-10-CM

## 2021-09-02 DIAGNOSIS — J41.0 SIMPLE CHRONIC BRONCHITIS (HCC): ICD-10-CM

## 2021-09-02 PROBLEM — F32.5 MAJOR DEPRESSION IN REMISSION (HCC): Status: ACTIVE | Noted: 2021-09-02

## 2021-09-02 PROCEDURE — G8417 CALC BMI ABV UP PARAM F/U: HCPCS | Performed by: FAMILY MEDICINE

## 2021-09-02 PROCEDURE — G8427 DOCREV CUR MEDS BY ELIG CLIN: HCPCS | Performed by: FAMILY MEDICINE

## 2021-09-02 PROCEDURE — G8752 SYS BP LESS 140: HCPCS | Performed by: FAMILY MEDICINE

## 2021-09-02 PROCEDURE — 1090F PRES/ABSN URINE INCON ASSESS: CPT | Performed by: FAMILY MEDICINE

## 2021-09-02 PROCEDURE — G8536 NO DOC ELDER MAL SCRN: HCPCS | Performed by: FAMILY MEDICINE

## 2021-09-02 PROCEDURE — 99214 OFFICE O/P EST MOD 30 MIN: CPT | Performed by: FAMILY MEDICINE

## 2021-09-02 PROCEDURE — G8510 SCR DEP NEG, NO PLAN REQD: HCPCS | Performed by: FAMILY MEDICINE

## 2021-09-02 PROCEDURE — 3288F FALL RISK ASSESSMENT DOCD: CPT | Performed by: FAMILY MEDICINE

## 2021-09-02 PROCEDURE — G8754 DIAS BP LESS 90: HCPCS | Performed by: FAMILY MEDICINE

## 2021-09-02 PROCEDURE — G0439 PPPS, SUBSEQ VISIT: HCPCS | Performed by: FAMILY MEDICINE

## 2021-09-02 PROCEDURE — 1100F PTFALLS ASSESS-DOCD GE2>/YR: CPT | Performed by: FAMILY MEDICINE

## 2021-09-02 NOTE — PROGRESS NOTES
This is the Subsequent Medicare Annual Wellness Exam, performed 12 months or more after the Initial AWV or the last Subsequent AWV    I have reviewed the patient's medical history in detail and updated the computerized patient record. Assessment/Plan   Education and counseling provided:  Are appropriate based on today's review and evaluation  End-of-Life planning (with patient's consent)    1. Encounter for annual wellness visit (AWV) in Medicare patient  2. Essential hypertension  -     CBC WITH AUTOMATED DIFF  -     METABOLIC PANEL, COMPREHENSIVE  -     URINALYSIS W/ RFLX MICROSCOPIC  3. Gastroesophageal reflux disease without esophagitis  4. Mixed hyperlipidemia  -     LIPID PANEL  5. Anxiety  6. Age-related osteoporosis without current pathological fracture  7. Simple chronic bronchitis (HCC)       Depression Risk Factor Screening     3 most recent PHQ Screens 9/2/2021   Little interest or pleasure in doing things Not at all   Feeling down, depressed, irritable, or hopeless Several days   Total Score PHQ 2 1       Alcohol Risk Screen    Do you average more than 1 drink per night or more than 7 drinks a week:  No    On any one occasion in the past three months have you have had more than 3 drinks containing alcohol:  No        Functional Ability and Level of Safety    Hearing: Hearing is good. Activities of Daily Living: The home contains: Has shower chair and uses a cane to walk  Patient does total self care      Ambulation: with difficulty, uses a cane     Fall Risk:  Fall Risk Assessment, last 12 mths 9/2/2021   Able to walk? Yes   Fall in past 12 months? 1   Do you feel unsteady? 1   Are you worried about falling 1   Is the gait abnormal? 0   Number of falls in past 12 months 2   Fall with injury?  1      Abuse Screen:  Patient is not abused       Cognitive Screening    Has your family/caregiver stated any concerns about your memory: no     Cognitive Screening: Abnormal - Mini Cog Test    Health Maintenance Due     Health Maintenance Due   Topic Date Due    DTaP/Tdap/Td series (1 - Tdap) Never done    Shingrix Vaccine Age 50> (1 of 2) Never done    Pneumococcal 65+ years (1 of 1 - PPSV23) Never done    Flu Vaccine (1) 09/01/2021       Patient Care Team   Patient Care Team:  Urvashi Fontenot MD as PCP - General (Family Medicine)  Urvashi Fontenot MD as PCP - [address] [place] [address] Provider  Pantera Swanson MD as Consulting Provider (Orthopedic Surgery)    History     Patient Active Problem List   Diagnosis Code    Anxiety F41.9    Anemia D64.9    Arthritis M19.90    Breast cancer ([address] C50.919    Heart disease I51.9    Hyperlipidemia E78.5    Hypertension I10    Simple chronic bronchitis ([address] J41.0    Sleep disorder G47.9    Stroke (cerebrum) ([address] I63.9    GERD (gastroesophageal reflux disease) K21.9    Fracture of L1 vertebra ([address] S32.019A    Intractable pain R52    L1 vertebral fracture ([address] S32.019A    Age-related osteoporosis without current pathological fracture M81.0     Past Medical History:   Diagnosis Date    Anemia 6/24/2020    Anxiety 6/24/2020    Arthritis 6/24/2020    Breast cancer ([address] 6/24/2020    GERD (gastroesophageal reflux disease) 6/24/2020    Heart disease 6/24/2020    Hyperlipidemia 6/24/2020    Hypertension 6/24/2020    Sleep disorder 6/24/2020    Stroke (cerebrum) ([facility] 6/24/2020 2012      Past Surgical History:   Procedure Laterality Date    HX BREAST LUMPECTOMY  1996    HX TUBAL LIGATION      PA REVISION AMBROSE-IMPLANT CAPSULE BREAST       Current Outpatient Medications   Medication Sig Dispense Refill    ALPRAZolam (XANAX) 1 mg tablet TAKE 1 TAB BY MOUTH 2 TIMES DAILY AS NEEDED FOR ANXIETY FOR UP TO 30 DAYS. MAX DAILY AMOUNT 2 MG 60 Tablet 0    atorvastatin (LIPITOR) 40 mg tablet TAKE 1 TABLET BY MOUTH EVERY DAY 90 Tablet 0    amLODIPine (NORVASC) 10 mg tablet Take 1 Tab by mouth daily.  30 Tab 0    metoprolol succinate (TOPROL-XL) 25 mg XL tablet Take 1 Tab by mouth two (2) times a day. Take two tablets daily 180 Tab 1    omeprazole (PRILOSEC) 40 mg capsule TAKE 1 CAPSULE BY MOUTH EVERY DAY 90 Cap 1    melatonin 3 mg tablet Take 3 mg by mouth nightly as needed for Insomnia.  cholecalciferol (Vitamin D3) 25 mcg (1,000 unit) cap Take 1,000 Units by mouth daily.  albuterol (PROVENTIL HFA, VENTOLIN HFA, PROAIR HFA) 90 mcg/actuation inhaler Take 2 Puffs by inhalation every six (6) hours as needed for Wheezing. 1 Inhaler 3    aspirin delayed-release 81 mg tablet Take  by mouth daily. Allergies   Allergen Reactions    Incruse Ellipta [Umeclidinium] Other (comments)     Throat felt funny and red on the outside on neck area.  Pcn [Penicillins] Unknown (comments)     States was in hospital a long time ago (in her 19's). Does'nt remember reaction she had. Family History   Problem Relation Age of Onset    Hypertension Mother     Hypertension Sister     Alzheimer Sister     Cancer Brother      Social History     Tobacco Use    Smoking status: Former Smoker    Smokeless tobacco: Never Used   Substance Use Topics    Alcohol use: Not Currently     Duane Spark (: 1940) is a [de-identified] y.o. female, established patient, here for evaluation of the following chief complaint(s):  Hypertension, Anemia, Cholesterol Problem, Medication Evaluation (Wants to discuss Prolia injection.), Labs, and Annual Wellness Visit [facility] (Subsequent). )       ASSESSMENT/PLAN:  Below is the assessment and plan developed based on review of pertinent history, physical exam, labs, studies, and medications. 1. Encounter for annual wellness visit (AWV) in Medicare patient  2. Essential hypertension  -     CBC WITH AUTOMATED DIFF  -     METABOLIC PANEL, COMPREHENSIVE  -     URINALYSIS W/ RFLX MICROSCOPIC  3. Gastroesophageal reflux disease without esophagitis  4. Mixed hyperlipidemia  -     LIPID PANEL  5. Anxiety  6.  Age-related osteoporosis without current pathological fracture  7. Simple chronic bronchitis ([address]      Return in about 3 months (around 12/2/2021) for Follow up of chronic medical conditions, Fasting Lab Appointment. SUBJECTIVE/OBJECTIVE:  Patient comes in for an annual Medicare wellness visit and follow-up of her hypertension, GERD, hyperlipidemia and osteoporosis. She would like a prescription for Prolia and needs lab work before we can do this. She continues to see her [facility] for her anxiety medication. She says that she is never really had depression and does not know why it is on her chart. She has COPD and using her inhalers and does have shortness of breath at times. Review of Systems   Constitutional: Negative. Respiratory: Positive for shortness of breath and wheezing. Cardiovascular: Negative. Gastrointestinal: Positive for abdominal pain (Heart burn controlled with medication). Endocrine: Negative. Genitourinary: Negative. Musculoskeletal: Negative. Allergic/Immunologic: Negative. Neurological: Negative. Hematological: Negative. Psychiatric/Behavioral: Negative. Physical Exam  Vitals and nursing note reviewed. Constitutional:       Appearance: Normal appearance. She is normal weight. HENT:      Head: Normocephalic and atraumatic. Cardiovascular:      Rate and Rhythm: Normal rate and regular rhythm. Heart sounds: Normal heart sounds. Pulmonary:      Effort: Pulmonary effort is normal.      Breath sounds: Normal breath sounds. Abdominal:      General: Abdomen is flat. Bowel sounds are normal.      Palpations: Abdomen is soft. Musculoskeletal:         General: Normal range of motion. Neurological:      General: No focal deficit present. Mental Status: She is alert. Psychiatric:         Mood and Affect: Mood normal.         Behavior: Behavior normal.         Thought Content:  Thought content normal.         Judgment: Judgment normal. Overall she is doing well and we will do lab work to see if her kidney function is okay. Her last bone density test was in 2018 and this showed severe osteoporosis with the T score of -3.6. Her blood pressure today was okay and her weight is stable. She was on alendronate but wants to try Prolia. An electronic signature was used to authenticate this note.   -- MD Obi Dyer MD

## 2021-09-02 NOTE — PATIENT INSTRUCTIONS
Medicare Wellness Visit, Female    The best way to improve and maintain good health is to have a healthy lifestyle by eating a well-balanced diet, exercising regularly, limiting alcohol and stopping smoking. Regular visits with your physician or non-physician health care provider also support your good health. Preventive screening tests can find health problems before they become diseases or illnesses. Here is a list of your current Health Maintenance items with a due date:  Health Maintenance   Topic Date Due    DTaP/Tdap/Td series (1 - Tdap) Never done    Shingrix Vaccine Age 50> (1 of 2) Never done    Pneumococcal 65+ years (1 of 1 - PPSV23) Never done    Medicare Yearly Exam  Never done    Flu Vaccine (1) 09/01/2021    Lipid Screen  11/05/2021    Bone Densitometry (Dexa) Screening  Completed    COVID-19 Vaccine  Completed       Preventive services such as immunizations prevent serious infections. All people over age 72 should have a Pneumovax and a Prevnar-13 shot to prevent potentially life threatening infections with the pneumococcus bacteria, a common cause of pneumonia. These are once in a lifetime unless you and your provider decide differently. All people over 65 should have a yearly influenza vaccine or \"flu\" shot. This does not prevent infection with cold viruses but has been proven to prevent hospitalization and death from influenza. Although Medicare part B \"regular Medicare\" currently only covers tetanus vaccination in the context of an injury, a tetanus vaccine (Tdap or Td) is recommended every 10 years. A new 2 shot shingles vaccine series (Shingrix) is recommended after age 48 even for people who have already received Zostavax (the old vaccine). It is also not covered by Medicare part B. Note, however, that both the Shingles vaccine and Tdap/Td are generally covered by secondary carriers. Please check your coverage and out of pocket expenses.  Consider contacting your local health department because it may stock these vaccines for a reasonable charge. We currently have documentation of the following immunization history for you:  Immunization History   Administered Date(s) Administered    Covid-19, MODERNA, Mrna, Lnp-s, Pf, 100mcg/0.5mL 03/19/2021, 04/23/2021    Influenza Vaccine (Mdck Quadrivalent)(>2 Yrs Flucelvax Quad vial [id number]) 10/19/2020           Screening for infection with Hepatitis C is recommended for anyone born between 80 through [address]. The table at the top of this document indicates the status of this and other preventive services. A bone mass density test (DEXA) to screen for osteoporosis or thinning of the bones should be done at least once after age 72 and may be done up to every 2 years as determined by you and your health care provider. The most recent DEXA we have on file for you is:  DEXA Results (most recent):  Results from Orders Only encounter on 03/29/21    DEXA BONE DENSITY STUDY AXIAL      Screening for diabetes mellitus with a blood sugar test (glucose) should be done at least every 3 years until age 79. You and your health care provider may decide whether to continue screening after age 79. The most recent blood glucose we have on file for you is:   Lab Results   Component Value Date/Time    Glucose 111 (H) 04/08/2021 05:22 AM       Fasting sugars >126 on 2 separate occassions are consistent with diabetes. Random sugars >200 on 2 separate occassions are consistent with diabetes    Glaucoma is a disease of the eye due to increased ocular pressure that can lead to blindness. People with risk factors for glaucoma ( race, diabetes, family history) should consider screening at least every 2 years by an eye professional.     Cardiovascular screening tests that check for elevated lipids or cholesterol (fatty part of blood) which can lead to heart disease and strokes should be done every 4-6 years through age 79.  You and your health care provider may decide whether to continue screening after age 79. The most recent lipid panel we have on file for you is:   Lab Results   Component Value Date/Time    Cholesterol, total 153 11/05/2020 03:46 PM    HDL Cholesterol 65 11/05/2020 03:46 PM    LDL, calculated 71 11/05/2020 03:46 PM    VLDL, calculated 17 11/05/2020 03:46 PM    Triglyceride 90 11/05/2020 03:46 PM       Colorectal cancer screening that evaluates for blood or polyps in your colon for people with average risk should be done yearly as a stool test, every five years as a flexible sigmoidoscope or every 10 years as a colonoscopy up to age 76. You and your health care provider may decide whether to continue screening after age 76. Breast cancer screening with a mammogram is recommended at least once every 2 years  for women age 54-69. You and your health care provider may decide whether to continue screening after age 76. The most recent mammogram we have on file for you is:   VIKASH Results (most recent):  No results found for this or any previous visit. Screening for cervical cancer with a pap smear is recommended for all women with a cervix until age 72. The frequency of this test is based on the details of her prior pap smear testing. You and your health care provider may decide whether to continue screening after age 72. Your Medicare Wellness Exam is recommended annually. Well Visit, Over 72: Care Instructions  Overview     Well visits can help you stay healthy. Your doctor has checked your overall health and may have suggested ways to take good care of yourself. Your doctor also may have recommended tests. At home, you can help prevent illness with healthy eating, regular exercise, and other steps. Follow-up care is a key part of your treatment and safety. Be sure to make and go to all appointments, and call your doctor if you are having problems.  It's also a good idea to know your test results and keep a list of the medicines you take. How can you care for yourself at home? · Get screening tests that you and your doctor decide on. Screening helps find diseases before any symptoms appear. · Eat healthy foods. Choose fruits, vegetables, whole grains, protein, and low-fat dairy foods. Limit fat, especially saturated fat. Reduce salt in your diet. · Limit alcohol. If you are a man, have no more than 2 drinks a day or 14 drinks a week. If you are a woman, have no more than 1 drink a day or 7 drinks a week. Since alcohol affects older adults differently, you may want to limit alcohol even more. Or you may not want to drink at all. · Get at least 30 minutes of exercise on most days of the week. Walking is a good choice. You also may want to do other activities, such as running, swimming, cycling, or playing tennis or team sports. · Reach and stay at a healthy weight. This will lower your risk for many problems, such as obesity, diabetes, heart disease, and high blood pressure. · Do not smoke. Smoking can make health problems worse. If you need help quitting, talk to your doctor about stop-smoking programs and medicines. These can increase your chances of quitting for good. · Care for your mental health. It is easy to get weighed down by worry and stress. Learn strategies to manage stress, like deep breathing and mindfulness, and stay connected with your family and community. If you find you often feel sad or hopeless, talk with your doctor. Treatment can help. · Talk to your doctor about whether you have any risk factors for sexually transmitted infections (STIs). You can help prevent STIs if you wait to have sex with a new partner (or partners) until you've each been tested for STIs. It also helps if you use condoms (male or female condoms) and if you limit your sex partners to one person who only has sex with you. Vaccines are available for some STIs.   · If you think you may have a problem with alcohol or drug use, talk to your doctor. This includes prescription medicines (such as amphetamines and opioids) and illegal drugs (such as cocaine and methamphetamine). Your doctor can help you figure out what type of treatment is best for you. · Protect your skin from too much sun. When you're outdoors from 10 a.m. to 4 p.m., stay in the shade or cover up with clothing and a hat with a wide brim. Wear sunglasses that block UV rays. Even when it's cloudy, put broad-spectrum sunscreen (SPF 30 or higher) on any exposed skin. · See a dentist one or two times a year for checkups and to have your teeth cleaned. · Wear a seat belt in the car. When should you call for help? Watch closely for changes in your health, and be sure to contact your doctor if you have any problems or symptoms that concern you. Where can you learn more? Go to http://www.gray.com/  Enter [id number] in the search box to learn more about \"Well Visit, Over 65: Care Instructions. \"  Current as of: May 27, 2020               Content Version: 12.8  © 2646-0110 [employer]. Care instructions adapted under license by [employer] (which disclaims liability or warranty for this information). If you have questions about a medical condition or this instruction, always ask your healthcare professional. [address] any warranty or liability for your use of this information. Osteoporosis: Care Instructions  Overview     Osteoporosis causes bones to become thin and weak. It is much more common in women than in men. Your chances of getting this disease depend on several things. These factors include the thickness of your bones (bone density), as well as health, diet, and physical activity. This disease may be very advanced before you know you have it. Sometimes the first sign is a broken bone in the hip, spine, or wrist. Or you may have sudden pain in your middle or lower back.   Follow-up care is a key part of your treatment and safety. Be sure to make and go to all appointments, and call your doctor if you are having problems. It's also a good idea to know your test results and keep a list of the medicines you take. How can you care for yourself at home? · Your doctor may prescribe a bisphosphonate, such as risedronate (Actonel) or alendronate (Fosamax), for osteoporosis. If you are taking one of these medicines by mouth:  ? Take your medicine with a full glass of water when you first get up in the morning. ? Do not lie down, eat, drink a beverage, or take any other medicine for at least 30 minutes after taking the drug. This helps prevent stomach problems. ? Do not take your medicine late in the day if you forgot to take it in the morning. Skip it, and take the usual dose the next morning. ? If you have side effects, tell your doctor. Your doctor may prescribe another medicine. · Get enough calcium and vitamin D. The recommended daily allowances (RDAs) for adults younger than age 46 are 1,000 mg of calcium and 600 IU of vitamin D each day. Women ages 46 to 79 need 1,200 mg of calcium and 600 IU of vitamin D each day. Men ages 46 to 79 need 1,000 mg of calcium and 600 IU of vitamin D each day. Adults 71 and older need 1,200 mg of calcium and 800 IU of vitamin D each day. It's not clear if people who already have osteoporosis need more calcium and vitamin D than this. Talk to your doctor about what's right for you.  ? Eat foods rich in calcium, like yogurt, cheese, milk, and dark green vegetables. This is a good way to get the calcium you need. You can get vitamin D from eggs, fatty fish, cereal, and milk. ? Ask your doctor if you need to take a calcium plus vitamin D supplement. You may be able to get enough calcium and vitamin D through your diet. Be careful with supplements.  Adults ages 23 to 48 should not get more than 2,500 mg of calcium and 4,000 IU of vitamin D each day, whether it is from supplements and/or food. Adults ages 46 and older should not get more than 2,000 mg of calcium and 4,000 IU of vitamin D each day from supplements and/or food. · Limit alcohol to 2 drinks a day for men and 1 drink a day for women. Too much alcohol can cause health problems. · Do not smoke. Smoking puts you at a much higher risk for osteoporosis. If you need help quitting, talk to your doctor about stop-smoking programs and medicines. These can increase your chances of quitting for good. · Get regular bone-building exercise. Weight-bearing and resistance exercises keep bones healthy by working the muscles and bones against gravity. Start out at an exercise level that feels right for you. Add a little at a time until you can do the following:  ? Do 30 minutes of weight-bearing exercise on most days of the week. Walking, jogging, stair climbing, and dancing are good choices. ? Do resistance exercises with weights or elastic bands 2 to 3 days a week. · Reduce your risk of falls:  ? Wear supportive shoes with low heels and nonslip soles. ? Use a cane or walker, if you need it. Use shower chairs and bath benches. Put in handrails on stairways, around your shower or tub area, and near the toilet. ? Keep stairs, porches, and walkways well lit. Use night-lights. ? Remove throw rugs and other objects that are in the way. ? Avoid icy, wet, or slippery surfaces. ? Keep a cordless phone and a flashlight with new batteries by your bed. When should you call for help? Watch closely for changes in your health, and be sure to contact your doctor if you have any problems. Where can you learn more? Go to http://www.gray.com/  Enter K100 in the search box to learn more about \"Osteoporosis: Care Instructions. \"  Current as of: December 7, 2020               Content Version: 12.8  © 5641-2303 [employer].    Care instructions adapted under license by [employer] (which disclaims liability or warranty for this information). If you have questions about a medical condition or this instruction, always ask your healthcare professional. [place] [address] any warranty or liability for your use of this information.

## 2021-09-03 LAB
ALBUMIN SERPL-MCNC: 4.3 G/DL (ref 3.7–4.7)
ALBUMIN/GLOB SERPL: 1.8 {RATIO} (ref 1.2–2.2)
ALP SERPL-CCNC: 101 IU/L (ref 48–121)
ALT SERPL-CCNC: 12 IU/L (ref 0–32)
APPEARANCE UR: CLEAR
AST SERPL-CCNC: 19 IU/L (ref 0–40)
BASOPHILS # BLD AUTO: 0 X10E3/UL (ref 0–0.2)
BASOPHILS NFR BLD AUTO: 1 %
BILIRUB SERPL-MCNC: 0.3 MG/DL (ref 0–1.2)
BILIRUB UR QL STRIP: NEGATIVE
BUN SERPL-MCNC: 14 MG/DL (ref 8–27)
BUN/CREAT SERPL: 18 (ref 12–28)
CALCIUM SERPL-MCNC: 9.3 MG/DL (ref 8.7–10.3)
CHLORIDE SERPL-SCNC: 106 MMOL/L (ref 96–106)
CHOLEST SERPL-MCNC: 174 MG/DL (ref 100–199)
CO2 SERPL-SCNC: 25 MMOL/L (ref 20–29)
COLOR UR: YELLOW
CREAT SERPL-MCNC: 0.8 MG/DL (ref 0.57–1)
EOSINOPHIL # BLD AUTO: 0.2 X10E3/UL (ref 0–0.4)
EOSINOPHIL NFR BLD AUTO: 4 %
ERYTHROCYTE [DISTWIDTH] IN BLOOD BY AUTOMATED COUNT: 13 % (ref 11.7–15.4)
GLOBULIN SER CALC-MCNC: 2.4 G/DL (ref 1.5–4.5)
GLUCOSE SERPL-MCNC: 104 MG/DL (ref 65–99)
GLUCOSE UR QL: NEGATIVE
HCT VFR BLD AUTO: 38.2 % (ref 34–46.6)
HDLC SERPL-MCNC: 50 MG/DL
HGB BLD-MCNC: 12 G/DL (ref 11.1–15.9)
HGB UR QL STRIP: NEGATIVE
IMM GRANULOCYTES # BLD AUTO: 0 X10E3/UL (ref 0–0.1)
IMM GRANULOCYTES NFR BLD AUTO: 0 %
KETONES UR QL STRIP: NEGATIVE
LDLC SERPL CALC-MCNC: 93 MG/DL (ref 0–99)
LEUKOCYTE ESTERASE UR QL STRIP: NEGATIVE
LYMPHOCYTES # BLD AUTO: 2 X10E3/UL (ref 0.7–3.1)
LYMPHOCYTES NFR BLD AUTO: 32 %
MCH RBC QN AUTO: 28 PG (ref 26.6–33)
MCHC RBC AUTO-ENTMCNC: 31.4 G/DL (ref 31.5–35.7)
MCV RBC AUTO: 89 FL (ref 79–97)
MICRO URNS: NORMAL
MONOCYTES # BLD AUTO: 0.4 X10E3/UL (ref 0.1–0.9)
MONOCYTES NFR BLD AUTO: 7 %
NEUTROPHILS # BLD AUTO: 3.4 X10E3/UL (ref 1.4–7)
NEUTROPHILS NFR BLD AUTO: 56 %
NITRITE UR QL STRIP: NEGATIVE
PH UR STRIP: 6 [PH] (ref 5–7.5)
PLATELET # BLD AUTO: 187 X10E3/UL (ref 150–450)
POTASSIUM SERPL-SCNC: 4.4 MMOL/L (ref 3.5–5.2)
PROT SERPL-MCNC: 6.7 G/DL (ref 6–8.5)
PROT UR QL STRIP: NEGATIVE
RBC # BLD AUTO: 4.28 X10E6/UL (ref 3.77–5.28)
SODIUM SERPL-SCNC: 142 MMOL/L (ref 134–144)
SP GR UR: 1.02 (ref 1–1.03)
TRIGL SERPL-MCNC: 181 MG/DL (ref 0–149)
UROBILINOGEN UR STRIP-MCNC: 0.2 MG/DL (ref 0.2–1)
VLDLC SERPL CALC-MCNC: 31 MG/DL (ref 5–40)
WBC # BLD AUTO: 6.1 X10E3/UL (ref 3.4–10.8)

## 2021-09-21 DIAGNOSIS — F41.9 ANXIETY: ICD-10-CM

## 2021-09-21 RX ORDER — ALPRAZOLAM 1 MG/1
TABLET ORAL
Qty: 60 TABLET | Refills: 0 | Status: SHIPPED | OUTPATIENT
Start: 2021-09-21 | End: 2021-10-28

## 2021-09-27 ENCOUNTER — TELEPHONE (OUTPATIENT)
Dept: PRIMARY CARE CLINIC | Age: 81
End: 2021-09-27

## 2021-10-01 DIAGNOSIS — M81.0 AGE-RELATED OSTEOPOROSIS WITHOUT CURRENT PATHOLOGICAL FRACTURE: Primary | ICD-10-CM

## 2021-10-01 NOTE — PROGRESS NOTES
I spoke with patient and informed her about the Prolia injection. She stated she uses [facility].  Now she wants to check with her insurance before you order it for her because if they do not pay the full amount for it, she does not want it. She is going to call them and then let me know whether to order it or not and I will let you know.

## 2021-10-03 DIAGNOSIS — K21.9 GASTROESOPHAGEAL REFLUX DISEASE WITHOUT ESOPHAGITIS: ICD-10-CM

## 2021-10-03 DIAGNOSIS — I10 ESSENTIAL HYPERTENSION: ICD-10-CM

## 2021-10-03 RX ORDER — OMEPRAZOLE 40 MG/1
CAPSULE, DELAYED RELEASE ORAL
Qty: 90 CAPSULE | Refills: 1 | Status: SHIPPED | OUTPATIENT
Start: 2021-10-03 | End: 2022-03-30 | Stop reason: SDUPTHER

## 2021-10-03 RX ORDER — METOPROLOL SUCCINATE 25 MG/1
TABLET, EXTENDED RELEASE ORAL
Qty: 180 TABLET | Refills: 1 | Status: SHIPPED | OUTPATIENT
Start: 2021-10-03 | End: 2022-04-01 | Stop reason: SDUPTHER

## 2021-10-15 ENCOUNTER — OFFICE VISIT (OUTPATIENT)
Dept: BEHAVIORAL/MENTAL HEALTH CLINIC | Age: 81
End: 2021-10-15
Payer: MEDICARE

## 2021-10-15 VITALS — BODY MASS INDEX: 31.44 KG/M2 | WEIGHT: 194.8 LBS

## 2021-10-15 DIAGNOSIS — F41.1 GENERALIZED ANXIETY DISORDER: Primary | ICD-10-CM

## 2021-10-15 PROCEDURE — G8427 DOCREV CUR MEDS BY ELIG CLIN: HCPCS | Performed by: NURSE PRACTITIONER

## 2021-10-15 PROCEDURE — G8417 CALC BMI ABV UP PARAM F/U: HCPCS | Performed by: NURSE PRACTITIONER

## 2021-10-15 PROCEDURE — G8432 DEP SCR NOT DOC, RNG: HCPCS | Performed by: NURSE PRACTITIONER

## 2021-10-15 PROCEDURE — 1090F PRES/ABSN URINE INCON ASSESS: CPT | Performed by: NURSE PRACTITIONER

## 2021-10-15 PROCEDURE — 3288F FALL RISK ASSESSMENT DOCD: CPT | Performed by: NURSE PRACTITIONER

## 2021-10-15 PROCEDURE — 1100F PTFALLS ASSESS-DOCD GE2>/YR: CPT | Performed by: NURSE PRACTITIONER

## 2021-10-15 PROCEDURE — G8536 NO DOC ELDER MAL SCRN: HCPCS | Performed by: NURSE PRACTITIONER

## 2021-10-15 PROCEDURE — 99213 OFFICE O/P EST LOW 20 MIN: CPT | Performed by: NURSE PRACTITIONER

## 2021-10-15 PROCEDURE — G8756 NO BP MEASURE DOC: HCPCS | Performed by: NURSE PRACTITIONER

## 2021-10-15 NOTE — PROGRESS NOTES
Terese Willingham is a [de-identified] y.o. female who presents today for the following:  Chief Complaint   Patient presents with    Follow-up     \"I'm up and down. \"    Anxiety       Allergies   Allergen Reactions    Incruse Ellipta [Umeclidinium] Other (comments)     Throat felt funny and red on the outside on neck area.  Pcn [Penicillins] Unknown (comments)     States was in hospital a long time ago (in her 19's). Does'nt remember reaction she had. Current Outpatient Medications   Medication Sig    omeprazole (PRILOSEC) 40 mg capsule TAKE 1 CAPSULE BY MOUTH EVERY DAY    metoprolol succinate (TOPROL-XL) 25 mg XL tablet TAKE 1 TAB BY MOUTH TWO (2) TIMES A DAY.  denosumab (PROLIA) 60 mg/mL injection 1 mL by SubCUTAneous route every 6 months.  ALPRAZolam (XANAX) 1 mg tablet TAKE 1 TAB BY MOUTH 2 TIMES DAILY AS NEEDED FOR ANXIETY FOR UP TO 30 DAYS. MAX DAILY AMOUNT 2 MG    atorvastatin (LIPITOR) 40 mg tablet TAKE 1 TABLET BY MOUTH EVERY DAY    amLODIPine (NORVASC) 10 mg tablet Take 1 Tab by mouth daily.  melatonin 3 mg tablet Take 3 mg by mouth nightly as needed for Insomnia.  cholecalciferol (Vitamin D3) 25 mcg (1,000 unit) cap Take 1,000 Units by mouth daily.  albuterol (PROVENTIL HFA, VENTOLIN HFA, PROAIR HFA) 90 mcg/actuation inhaler Take 2 Puffs by inhalation every six (6) hours as needed for Wheezing.  aspirin delayed-release 81 mg tablet Take  by mouth daily. No current facility-administered medications for this visit.        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       Past Surgical History:   Procedure Laterality Date    HX BREAST LUMPECTOMY  1996    HX TUBAL LIGATION      [place] REVISION AMBROSE-IMPLANT CAPSULE BREAST         Family History   Problem Relation Age of Onset    Hypertension Mother     Hypertension Sister     Alzheimer Sister     Cancer Brother        Social History     Socioeconomic History    Marital status:      Spouse name: Not on file    Number of children: 3    Years of education: Not on file    Highest education level: High school graduate   Occupational History    Not on file   Tobacco Use    Smoking status: Former Smoker    Smokeless tobacco: Never Used   Substance and Sexual Activity    Alcohol use: Not Currently    Drug use: Never    Sexual activity: Not Currently   Other Topics Concern     Service Not Asked    Blood Transfusions Not Asked    Caffeine Concern Not Asked    Occupational Exposure Not Asked    Hobby Hazards Not Asked    Sleep Concern Not Asked    Stress Concern Not Asked    Weight Concern Not Asked    Special Diet Not Asked    Back Care Not Asked    Exercise Not Asked    Bike Helmet Not Asked   [address] Not Asked    Self-Exams Not Asked   Social History Narrative    Not on file     Social Determinants of Health     Financial Resource Strain:     Difficulty of Paying Living Expenses:    Food Insecurity:     Worried About Running Out of Food in the Last Year:     Ran Out of Food in the Last Year:    Transportation Needs:     Lack of Transportation (Medical):      Lack of Transportation (Non-Medical):    Physical Activity:     Days of Exercise per Week:     Minutes of Exercise per Session:    Stress:     Feeling of Stress :    Social Connections:     Frequency of Communication with Friends and Family:     Frequency of Social Gatherings with Friends and Family:     Attends [employer] Services:     Active Member of Clubs or Organizations:     Attends Club or Organization Meetings:     Marital Status:    Intimate Partner Violence:     Fear of Current or Ex-Partner:     Emotionally Abused:     Physically Abused:     Sexually Abused:          Ms. Angel Villafuerte follows up in the clinic for generalized anxiety disorder. She is prescribed Xanax 1 mg take 1 tablet twice daily as needed for anxiety. Patient last visited the clinic via telephone visit February 15, 2021. She reports since last visit she had a fall April 2021 and was hospitalized, rehabilitated and was discharged with home health. Patient presents to the clinic unaccompanied, clean fully alert and oriented. Gait is stable and slow. Mood is mildly anxious. Patient denies feeling depressed on today's visit. No psychotic symptoms observed or reported. No suicidal/homicidal thinking, risk for suicide is low, protective factor-family. She reports anxiety is under control with Xanax. She mentions having some mild fluctuations in mood which she describes as being \"up-and-down. \"  Patient reports that she has low energy which she relates to chronic health conditions such as COPD. Patient denies having any falls since April. She is fully aware of risks associated with benzodiazepine use in elderly such as increased risk for falls and confusion. Patient is reluctant to trial dose reduction and is requesting to continue medication as prescribed. She denies issues with appetite. Sleep is stable with melatonin. Patient reports that she follows up with medical as appropriate. Lab work reviewed from Sept 2021, showed mildly elevated glucose and MCHC and elevated triglyceride level and the rest of it was normal.  Patient lives independently with good family and social support. She reports her son plans to come home for Thanksgiving. No alcohol or drug use reported. No issues with judgment or memory. Review of Systems   Constitutional: Positive for malaise/fatigue. Musculoskeletal: Positive for back pain, falls (last fall-April) and joint pain. Psychiatric/Behavioral: The patient is nervous/anxious.     All other systems reviewed and are negative. Visit Vitals  Wt 88.4 kg (194 lb 12.8 oz)   BMI 31.44 kg/m²     Physical Exam  Psychiatric:         Attention and Perception: Attention and perception normal.         Mood and Affect: Mood is anxious (mild). Speech: Speech normal.         Behavior: Behavior normal. Behavior is cooperative. Thought Content: Thought content normal.         Cognition and Memory: Cognition and memory normal.         Judgment: Judgment normal.        Plan:    Continue Xanax as prescribed. Follow-up with medical provider as appropriate. For emergencies-call 911 or go to the emergency department. Advised patient to avoid smoking, alcohol and drug use. Patient may return to the clinic in 6 months or earlier if needed.

## 2021-10-28 DIAGNOSIS — F41.9 ANXIETY: ICD-10-CM

## 2021-10-28 RX ORDER — ALPRAZOLAM 1 MG/1
TABLET ORAL
Qty: 60 TABLET | Refills: 2 | Status: SHIPPED | OUTPATIENT
Start: 2021-10-28 | End: 2022-01-26

## 2021-11-01 ENCOUNTER — OFFICE VISIT (OUTPATIENT)
Dept: PRIMARY CARE CLINIC | Age: 81
End: 2021-11-01
Payer: MEDICARE

## 2021-11-01 DIAGNOSIS — Z23 NEEDS FLU SHOT: Primary | ICD-10-CM

## 2021-11-01 PROCEDURE — 90694 VACC AIIV4 NO PRSRV 0.5ML IM: CPT | Performed by: NURSE PRACTITIONER

## 2021-11-01 PROCEDURE — G0008 ADMIN INFLUENZA VIRUS VAC: HCPCS | Performed by: NURSE PRACTITIONER

## 2022-01-26 DIAGNOSIS — F41.9 ANXIETY: ICD-10-CM

## 2022-01-26 RX ORDER — ALPRAZOLAM 1 MG/1
TABLET ORAL
Qty: 60 TABLET | Refills: 2 | Status: SHIPPED | OUTPATIENT
Start: 2022-01-26 | End: 2022-02-25

## 2022-03-18 PROBLEM — S32.019A L1 VERTEBRAL FRACTURE (HCC): Status: ACTIVE | Noted: 2021-04-05

## 2022-03-18 PROBLEM — J41.0 SIMPLE CHRONIC BRONCHITIS (HCC): Status: ACTIVE | Noted: 2020-06-24

## 2022-03-19 PROBLEM — I63.9 STROKE (CEREBRUM) (HCC): Status: ACTIVE | Noted: 2020-06-24

## 2022-03-19 PROBLEM — E78.5 HYPERLIPIDEMIA: Status: ACTIVE | Noted: 2020-06-24

## 2022-03-19 PROBLEM — C50.919 BREAST CANCER (HCC): Status: ACTIVE | Noted: 2020-06-24

## 2022-03-19 PROBLEM — G47.9 SLEEP DISORDER: Status: ACTIVE | Noted: 2020-06-24

## 2022-03-19 PROBLEM — F41.9 ANXIETY: Status: ACTIVE | Noted: 2020-06-24

## 2022-03-19 PROBLEM — D64.9 ANEMIA: Status: ACTIVE | Noted: 2020-06-24

## 2022-03-19 PROBLEM — R52 INTRACTABLE PAIN: Status: ACTIVE | Noted: 2021-04-05

## 2022-03-19 PROBLEM — M81.0 AGE-RELATED OSTEOPOROSIS WITHOUT CURRENT PATHOLOGICAL FRACTURE: Status: ACTIVE | Noted: 2021-09-02

## 2022-03-19 PROBLEM — M19.90 ARTHRITIS: Status: ACTIVE | Noted: 2020-06-24

## 2022-03-19 PROBLEM — I10 HYPERTENSION: Status: ACTIVE | Noted: 2020-06-24

## 2022-03-19 PROBLEM — S32.019A FRACTURE OF L1 VERTEBRA (HCC): Status: ACTIVE | Noted: 2021-04-05

## 2022-03-20 PROBLEM — K21.9 GERD (GASTROESOPHAGEAL REFLUX DISEASE): Status: ACTIVE | Noted: 2020-06-24

## 2022-03-20 PROBLEM — I51.9 HEART DISEASE: Status: ACTIVE | Noted: 2020-06-24

## 2022-03-30 ENCOUNTER — HOSPITAL ENCOUNTER (OUTPATIENT)
Dept: GENERAL RADIOLOGY | Age: 82
Discharge: HOME | End: 2022-03-30
Payer: MEDICARE

## 2022-03-30 ENCOUNTER — OFFICE VISIT (OUTPATIENT)
Dept: PRIMARY CARE CLINIC | Age: 82
End: 2022-03-30
Payer: MEDICARE

## 2022-03-30 VITALS
WEIGHT: 186.6 LBS | DIASTOLIC BLOOD PRESSURE: 85 MMHG | OXYGEN SATURATION: 98 % | SYSTOLIC BLOOD PRESSURE: 139 MMHG | BODY MASS INDEX: 29.99 KG/M2 | HEART RATE: 78 BPM | TEMPERATURE: 98.3 F | RESPIRATION RATE: 18 BRPM | HEIGHT: 66 IN

## 2022-03-30 DIAGNOSIS — M81.0 AGE-RELATED OSTEOPOROSIS WITHOUT CURRENT PATHOLOGICAL FRACTURE: ICD-10-CM

## 2022-03-30 DIAGNOSIS — Z85.3 HISTORY OF BREAST CANCER: ICD-10-CM

## 2022-03-30 DIAGNOSIS — I34.0 NONRHEUMATIC MITRAL VALVE REGURGITATION: ICD-10-CM

## 2022-03-30 DIAGNOSIS — M54.50 ACUTE MIDLINE LOW BACK PAIN WITHOUT SCIATICA: ICD-10-CM

## 2022-03-30 DIAGNOSIS — M54.50 CHRONIC MIDLINE LOW BACK PAIN, UNSPECIFIED WHETHER SCIATICA PRESENT: ICD-10-CM

## 2022-03-30 DIAGNOSIS — S30.0XXA CONTUSION OF SACRUM, INITIAL ENCOUNTER: ICD-10-CM

## 2022-03-30 DIAGNOSIS — I10 ESSENTIAL HYPERTENSION: Primary | ICD-10-CM

## 2022-03-30 DIAGNOSIS — I25.10 CORONARY ARTERY DISEASE INVOLVING NATIVE HEART WITHOUT ANGINA PECTORIS, UNSPECIFIED VESSEL OR LESION TYPE: ICD-10-CM

## 2022-03-30 DIAGNOSIS — S33.8XXA SACRUM SPRAIN, INITIAL ENCOUNTER: ICD-10-CM

## 2022-03-30 DIAGNOSIS — Z86.73 HISTORY OF CVA (CEREBROVASCULAR ACCIDENT): ICD-10-CM

## 2022-03-30 DIAGNOSIS — G89.29 CHRONIC MIDLINE LOW BACK PAIN, UNSPECIFIED WHETHER SCIATICA PRESENT: ICD-10-CM

## 2022-03-30 DIAGNOSIS — K21.9 GASTROESOPHAGEAL REFLUX DISEASE WITHOUT ESOPHAGITIS: ICD-10-CM

## 2022-03-30 DIAGNOSIS — J41.0 SIMPLE CHRONIC BRONCHITIS (HCC): ICD-10-CM
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
## 2022-03-30 DIAGNOSIS — E55.9 VITAMIN D DEFICIENCY: ICD-10-CM

## 2022-03-30 DIAGNOSIS — F41.9 ANXIETY: ICD-10-CM

## 2022-03-30 DIAGNOSIS — E78.5 HYPERLIPIDEMIA, UNSPECIFIED HYPERLIPIDEMIA TYPE: ICD-10-CM

## 2022-03-30 PROBLEM — C50.919 BREAST CANCER (HCC): Status: RESOLVED | Noted: 2020-06-24 | Resolved: 2022-03-30

## 2022-03-30 PROCEDURE — 72100 X-RAY EXAM L-S SPINE 2/3 VWS: CPT

## 2022-03-30 PROCEDURE — G8752 SYS BP LESS 140: HCPCS | Performed by: NURSE PRACTITIONER

## 2022-03-30 PROCEDURE — G8536 NO DOC ELDER MAL SCRN: HCPCS | Performed by: NURSE PRACTITIONER

## 2022-03-30 PROCEDURE — G8432 DEP SCR NOT DOC, RNG: HCPCS | Performed by: NURSE PRACTITIONER

## 2022-03-30 PROCEDURE — G8754 DIAS BP LESS 90: HCPCS | Performed by: NURSE PRACTITIONER

## 2022-03-30 PROCEDURE — G8417 CALC BMI ABV UP PARAM F/U: HCPCS | Performed by: NURSE PRACTITIONER

## 2022-03-30 PROCEDURE — 1101F PT FALLS ASSESS-DOCD LE1/YR: CPT | Performed by: NURSE PRACTITIONER

## 2022-03-30 PROCEDURE — 1090F PRES/ABSN URINE INCON ASSESS: CPT | Performed by: NURSE PRACTITIONER

## 2022-03-30 PROCEDURE — 99214 OFFICE O/P EST MOD 30 MIN: CPT | Performed by: NURSE PRACTITIONER

## 2022-03-30 PROCEDURE — G8427 DOCREV CUR MEDS BY ELIG CLIN: HCPCS | Performed by: NURSE PRACTITIONER

## 2022-03-30 PROCEDURE — 72220 X-RAY EXAM SACRUM TAILBONE: CPT

## 2022-03-30 RX ORDER — OMEPRAZOLE 40 MG/1
40 CAPSULE, DELAYED RELEASE ORAL DAILY
Qty: 90 CAPSULE | Refills: 1 | Status: SHIPPED | OUTPATIENT
Start: 2022-03-30

## 2022-03-30 RX ORDER — AMLODIPINE BESYLATE 2.5 MG/1
TABLET ORAL
COMMUNITY
End: 2022-09-13 | Stop reason: ALTCHOICE

## 2022-03-30 RX ORDER — ATORVASTATIN CALCIUM 40 MG/1
40 TABLET, FILM COATED ORAL DAILY
Qty: 90 TABLET | Refills: 0 | Status: SHIPPED | OUTPATIENT
Start: 2022-03-30 | End: 2022-06-27

## 2022-03-30 RX ORDER — ALPRAZOLAM 1 MG/1
1 TABLET ORAL
COMMUNITY
End: 2022-06-16

## 2022-03-30 NOTE — PROGRESS NOTES
Chief Complaint   Patient presents with    Hypertension    GERD    Fall     Establish care and pt is having  a lot of falls but its not from dizziness or any symptoms. She said its from her shoes or rain or clumsiness. 1. \"Have you been to the ER, urgent care clinic since your last visit? Hospitalized since your last visit? \" No    2. \"Have you seen or consulted any other health care providers outside of the [address] [place] [address] since your last visit? \" No     3. For patients aged 39-70: Has the patient had a colonoscopy / FIT/ Cologuard? NA - based on age      If the patient is female:    4. For patients aged 41-77: Has the patient had a mammogram within the past 2 years? NA - based on age or sex      11. For patients aged 21-65: Has the patient had a pap smear?  NA - based on age or sex

## 2022-03-30 NOTE — PROGRESS NOTES
Arthur Gillis is a 80 y.o. female who presents to the office today for the following:    Chief Complaint   Patient presents with    Hypertension    GERD    Fall       Past Medical History:   Diagnosis Date    Anemia 6/24/2020    Anxiety 6/24/2020    Arthritis 6/24/2020    Breast cancer ([facility] [address] 6/24/2020    GERD (gastroesophageal reflux disease) 6/24/2020    Heart disease 6/24/2020    Hyperlipidemia 6/24/2020    Hypertension 6/24/2020    Sleep disorder 6/24/2020    Stroke (cerebrum) ([facility] [address] 6/24/2020 2012       Past Surgical History:   Procedure Laterality Date    HX BREAST LUMPECTOMY  1996    HX TUBAL LIGATION      [place] REVISION AMBROSE-IMPLANT CAPSULE BREAST          Family History   Problem Relation Age of Onset    Hypertension Mother     Hypertension Sister     Alzheimer's Disease Sister     Cancer Brother         Social History     Tobacco Use    Smoking status: Former Smoker    Smokeless tobacco: Never Used   Substance Use Topics    Alcohol use: Not Currently    Drug use: Never        HPI  Patient here today as new patient to provider with PMH of hyperlipidemia, GERD, hypertension, cva, anxiety, CAD, MV regurgitation, osteoporosis,vitamin d deficiency and L1 fracture. Was following with Dr. Jan Aguilar prior to group home. Does see cardiology at [facility]. States that she tripped over feet while changing sheets and fell backwards onto carpeted floor. Was able to get up on her own but has been having pain in low back since then. Been unable to do many of her ADL's due to pain and laying around more. Has fractured her L1 in the past from a fall. Denies any numbness, weakness or loss of bowel/bladder. Current Outpatient Medications on File Prior to Visit   Medication Sig    amLODIPine (NORVASC) 2.5 mg tablet Take  by mouth daily as needed. Suzanne shearer    ALPRAZolam Jailyn Smart) 1 mg tablet Take 1 mg by mouth nightly as needed for Anxiety.  Patrizia daniels    metoprolol succinate (TOPROL-XL) 25 mg XL tablet TAKE 1 TAB BY MOUTH TWO (2) TIMES A DAY.  melatonin 3 mg tablet Take 9 mg by mouth nightly as needed for Insomnia.  albuterol (PROVENTIL HFA, VENTOLIN HFA, PROAIR HFA) 90 mcg/actuation inhaler Take 2 Puffs by inhalation every six (6) hours as needed for Wheezing.  aspirin delayed-release 81 mg tablet Take  by mouth daily.  [DISCONTINUED] omeprazole (PRILOSEC) 40 mg capsule TAKE 1 CAPSULE BY MOUTH EVERY DAY    denosumab (PROLIA) 60 mg/mL injection 1 mL by SubCUTAneous route every 6 months. (Patient not taking: Reported on 3/30/2022)    [DISCONTINUED] atorvastatin (LIPITOR) 40 mg tablet TAKE 1 TABLET BY MOUTH EVERY DAY    [DISCONTINUED] amLODIPine (NORVASC) 10 mg tablet Take 1 Tab by mouth daily.  cholecalciferol (Vitamin D3) 25 mcg (1,000 unit) cap Take 1,000 Units by mouth daily. (Patient not taking: Reported on 3/30/2022)     No current facility-administered medications on file prior to visit. Medications Ordered Today   Medications    atorvastatin (LIPITOR) 40 mg tablet     Sig: Take 1 Tablet by mouth daily. Dispense:  90 Tablet     Refill:  0    omeprazole (PRILOSEC) 40 mg capsule     Sig: Take 1 Capsule by mouth daily. Dispense:  90 Capsule     Refill:  1        Review of Systems   Constitutional: Negative. HENT: Negative. Eyes: Negative. Respiratory: Negative. Cardiovascular: Negative. Gastrointestinal: Negative. Musculoskeletal: Positive for back pain, falls and myalgias. Skin: Negative. Neurological: Negative for dizziness, tingling, sensory change, speech change, focal weakness, seizures, loss of consciousness and headaches. Visit Vitals  /85 (BP 1 Location: Left upper arm, BP Patient Position: Sitting, BP Cuff Size: Adult)   Pulse 78   Temp 98.3 °F (36.8 °C) (Temporal)   Resp 18   Ht 5' 6\" (1.676 m)   Wt 186 lb 9.6 oz (84.6 kg)   SpO2 98%   BMI 30.12 kg/m²       Physical Exam  Vitals and nursing note reviewed. Constitutional:       Appearance: Normal appearance. Cardiovascular:      Rate and Rhythm: Normal rate and regular rhythm. Pulses: Normal pulses. Heart sounds: Murmur heard. Pulmonary:      Effort: Pulmonary effort is normal.      Breath sounds: Normal breath sounds. Abdominal:      General: Bowel sounds are normal.      Palpations: Abdomen is soft. Tenderness: There is no abdominal tenderness. There is no guarding. Musculoskeletal:      Cervical back: Normal.      Thoracic back: Normal.      Lumbar back: Tenderness present. Decreased range of motion. Legs:    Skin:     General: Skin is warm and dry. Neurological:      Mental Status: She is alert. Cranial Nerves: Cranial nerves are intact. Sensory: Sensation is intact. Motor: Motor function is intact. Coordination: Coordination is intact. Gait: Gait abnormal.      Deep Tendon Reflexes: Abnormal reflex: unable to assess. Comments: Uses cane   Psychiatric:         Mood and Affect: Mood normal.         Behavior: Behavior normal.            1. Hyperlipidemia, unspecified hyperlipidemia type  Lab Results   Component Value Date/Time    LDL, calculated 93 09/02/2021 02:53 PM     - atorvastatin (LIPITOR) 40 mg tablet; Take 1 Tablet by mouth daily. Dispense: 90 Tablet; Refill: 0    2. Gastroesophageal reflux disease without esophagitis  Controlled and on prilosec as directed  - omeprazole (PRILOSEC) 40 mg capsule; Take 1 Capsule by mouth daily. Dispense: 90 Capsule; Refill: 1    3. Essential hypertension  Blood pressure is controlled and continue medication as directed  Monitor readings at home and notify provider if > 140/90    4. Simple chronic bronchitis ([facility] [address]  Controlled and reports infrequent use of albuterol    5. Vitamin D deficiency  Lab Results   Component Value Date/Time    VITAMIN D, 25-HYDROXY 48.0 11/05/2020 03:46 PM   Continues vitamin d and calcium supplement     6.  Anxiety  Uses alprazolam prn and follows with Patrizia Domingo    7. History of CVA (cerebrovascular accident)  On ASA 81mg     8. Age-related osteoporosis without current pathological fracture  On prolia injections every 6 months    9. Coronary artery disease involving native heart without angina pectoris, unspecified vessel or lesion type  No hx of stents  On ASA and atorvastatin  Follows with [facility]    10. Nonrheumatic mitral valve regurgitation  07/28/21    ECHO ADULT COMPLETE 07/28/2021 7/28/2021    Interpretation Summary  · LV: Estimated LVEF is 60 - 65%. Normal cavity size, wall thickness and systolic function (ejection fraction normal). Wall motion: normal. Age-appropriate left ventricular diastolic function. · LA: Mildly dilated left atrium. Left Atrium volume index is 31 mL/m2. · AV: Mild aortic valve leaflet calcification present without reduced excursion. · MV: Mild mitral valve regurgitation is present. · TV: Right Ventricular Arterial Pressure (RVSP) is 36 mmHg. Signed by: Cydney Dunn MD on 7/28/2021  3:21 PM  Follows with [facility]    11. Fall, initial encounter  Continue using cane     12. Sacrum contusion, initial encounter  Tenderness on exam but no significant radicular symptoms  Check xray  Sit on donut pillow  Avoid activity that may strain or aggravate affected area  May use tylenol prn for pain  Discuss further recommendations pending xray results  - XR SACRUM AND COCCYX; Future    13. Acute midline low back pain without sciatica  Tenderness on exam and does have h/o L1 fracture  No significant radicular symptoms  Check xray   Avoid activity that may strain or aggravate affected area  May use tylenol prn for pain  Discuss further recommendations pending xray results  - XR SPINE LUMB 2 OR 3 V; Future    14.  History of breast cancer  Not continuing monitoring for this        Patient verbalizes understanding of plan of care as discussed above    Follow-up and Dispositions · Return in about 4 weeks (around 4/27/2022) for or sooner for worsening symptoms.

## 2022-04-01 DIAGNOSIS — I10 ESSENTIAL HYPERTENSION: ICD-10-CM

## 2022-04-01 RX ORDER — METOPROLOL SUCCINATE 25 MG/1
25 TABLET, EXTENDED RELEASE ORAL 2 TIMES DAILY
Qty: 180 TABLET | Refills: 1 | Status: SHIPPED | OUTPATIENT
Start: 2022-04-01 | End: 2022-09-25

## 2022-04-05 NOTE — PROGRESS NOTES
Please let patient know that xrays do not show evidence of acute fracture of sacrum or coccyx. Known deformity at L1 has worsened since prior imaging. Given osteoporosis history,is possible she may warrant additional imaging to clarify. I am sending referral to spine specialist to evaluate further. If she has any changes or worsening, please let me know.

## 2022-04-08 NOTE — PROGRESS NOTES
I have sent the prolia. As long as she verbalizes understanding that given pain and worsening noted on imaging that this can mean condition could worsen  or lead to worsening outcome without evaluation from orthopedic.

## 2022-04-08 NOTE — PROGRESS NOTES
She is on prolia per her chart already.  Did she not take the shot when dr. Mustapha Nails ordered 10/1/22

## 2022-04-08 NOTE — PROGRESS NOTES
Informed patient  that xrays do not show evidence of acute fracture of sacrum or coccyx. Known deformity at L1 has worsened since prior imaging. Given osteoporosis history,is possible she may warrant additional imaging to clarify. I am sending referral to spine specialist to evaluate further. If she has any changes or worsening, please let me know per SUZANNE Streeter NP. Patient stated she could not go to anyone outside of \[facility]\"" and she thinks she is too old to start anything new now. So, she wants to try the Prolia injection. This was ordered before but patient stated she was in Rehab and could not get the injection and it was returned. She was also on Fosamax but has not taken any since 2020. Right now all she wants is to try the Prolia shot please.

## 2022-04-08 NOTE — PROGRESS NOTES
Dr. Ballesteros Hopes ordered it and then the patient ended up in rehab from falling and did not receive it. It was returned back to the Pharmacy. She has never had it before and wants to try it now. The last time the RX went to [facility] and the forwarded it to the [address]. They mailed the injection to the patient and she was to bring it in for us to administer which never happened.

## 2022-04-11 NOTE — PROGRESS NOTES
Spoke with patient and she stated the pain is getting better. So at this time, she wants to wait a little while longer before she makes a final decision to see an Orthopedic doctor per patient. I also informed patient that as long as she verbalizes understanding that given pain and worsening noted on imaging that this can mean condition could worsen  or lead to worsening outcome without evaluation from orthopedic per SUZANNE Engle NP. Patient still wants to wait.

## 2022-04-15 ENCOUNTER — OFFICE VISIT (OUTPATIENT)
Dept: BEHAVIORAL/MENTAL HEALTH CLINIC | Age: 82
End: 2022-04-15
Payer: MEDICARE

## 2022-04-15 VITALS — BODY MASS INDEX: 30.67 KG/M2 | WEIGHT: 190 LBS

## 2022-04-15 DIAGNOSIS — F41.1 GENERALIZED ANXIETY DISORDER: Primary | ICD-10-CM

## 2022-04-15 PROCEDURE — G8427 DOCREV CUR MEDS BY ELIG CLIN: HCPCS | Performed by: NURSE PRACTITIONER

## 2022-04-15 PROCEDURE — G8417 CALC BMI ABV UP PARAM F/U: HCPCS | Performed by: NURSE PRACTITIONER

## 2022-04-15 PROCEDURE — 99213 OFFICE O/P EST LOW 20 MIN: CPT | Performed by: NURSE PRACTITIONER

## 2022-04-15 PROCEDURE — G8432 DEP SCR NOT DOC, RNG: HCPCS | Performed by: NURSE PRACTITIONER

## 2022-04-15 PROCEDURE — 1101F PT FALLS ASSESS-DOCD LE1/YR: CPT | Performed by: NURSE PRACTITIONER

## 2022-04-15 PROCEDURE — 1090F PRES/ABSN URINE INCON ASSESS: CPT | Performed by: NURSE PRACTITIONER

## 2022-04-15 PROCEDURE — G8756 NO BP MEASURE DOC: HCPCS | Performed by: NURSE PRACTITIONER

## 2022-04-15 PROCEDURE — G8536 NO DOC ELDER MAL SCRN: HCPCS | Performed by: NURSE PRACTITIONER

## 2022-04-15 NOTE — PROGRESS NOTES
Samara Brandt is a 80 y.o. female who presents today for the following:  Chief Complaint   Patient presents with    Follow-up     \"Nothing really bothers me except the weather. \"    Anxiety    Medication Management       Allergies   Allergen Reactions    Incruse Ellipta [Umeclidinium] Other (comments)     Throat felt funny and red on the outside on neck area.  Pcn [Penicillins] Unknown (comments)     States was in hospital a long time ago (in her 19's). Does'nt remember reaction she had. Current Outpatient Medications   Medication Sig    metoprolol succinate (TOPROL-XL) 25 mg XL tablet Take 1 Tablet by mouth two (2) times a day.  denosumab (PROLIA) 60 mg/mL injection 1 mL by SubCUTAneous route every 6 months.  amLODIPine (NORVASC) 2.5 mg tablet Take  by mouth daily as needed. Edra Ser page    ALPRAZolam Darcie Riling) 1 mg tablet Take 1 mg by mouth nightly as needed for Anxiety. Patrizia daniels    atorvastatin (LIPITOR) 40 mg tablet Take 1 Tablet by mouth daily.  omeprazole (PRILOSEC) 40 mg capsule Take 1 Capsule by mouth daily.  melatonin 3 mg tablet Take 9 mg by mouth nightly as needed for Insomnia.  cholecalciferol (Vitamin D3) 25 mcg (1,000 unit) cap Take 1,000 Units by mouth daily. (Patient not taking: Reported on 3/30/2022)    albuterol (PROVENTIL HFA, VENTOLIN HFA, PROAIR HFA) 90 mcg/actuation inhaler Take 2 Puffs by inhalation every six (6) hours as needed for Wheezing.  aspirin delayed-release 81 mg tablet Take  by mouth daily. No current facility-administered medications for this visit.        Past Medical History:   Diagnosis Date    Anemia 6/24/2020    Anxiety 6/24/2020    Arthritis 6/24/2020    Breast cancer ([address] 6/24/2020    GERD (gastroesophageal reflux disease) 6/24/2020    Heart disease 6/24/2020    Hyperlipidemia 6/24/2020    Hypertension 6/24/2020    Sleep disorder 6/24/2020    Stroke (cerebrum) ([facility] [address] 6/24/2020 2012       Past Surgical History: Procedure Laterality Date    HX BREAST LUMPECTOMY  1996    HX TUBAL LIGATION      AL REVISION AMBROSE-IMPLANT CAPSULE BREAST         Family History   Problem Relation Age of Onset    Hypertension Mother     Hypertension Sister     Alzheimer's Disease Sister     Cancer Brother        Social History     Socioeconomic History    Marital status:      Spouse name: Not on file    Number of children: 3    Years of education: Not on file    Highest education level: High school graduate   Occupational History    Not on file   Tobacco Use    Smoking status: Former Smoker    Smokeless tobacco: Never Used   Substance and Sexual Activity    Alcohol use: Not Currently    Drug use: Never    Sexual activity: Not Currently   Other Topics Concern     Service Not Asked    Blood Transfusions Not Asked    Caffeine Concern Not Asked    Occupational Exposure Not Asked    Hobby Hazards Not Asked    Sleep Concern Not Asked    Stress Concern Not Asked    Weight Concern Not Asked    Special Diet Not Asked    Back Care Not Asked    Exercise Not Asked    Bike Helmet Not Asked   [address] Not Asked    Self-Exams Not Asked   Social History Narrative    Not on file     Social Determinants of Health     Financial Resource Strain:     Difficulty of Paying Living Expenses: Not on file   Food Insecurity:     Worried About Running Out of Food in the Last Year: Not on file    Elena of Food in the Last Year: Not on file   Transportation Needs:     Lack of Transportation (Medical): Not on file    Lack of Transportation (Non-Medical):  Not on file   Physical Activity:     Days of Exercise per Week: Not on file    Minutes of Exercise per Session: Not on file   Stress:     Feeling of Stress : Not on file   Social Connections:     Frequency of Communication with Friends and Family: Not on file    Frequency of Social Gatherings with Friends and Family: Not on file    Attends Sikhism Services: Not on file   [address] [facility] Game Interaction or Organizations: Not on file    Attends Club or Organization Meetings: Not on file    Marital Status: Not on file   Intimate Partner Violence:     Fear of Current or Ex-Partner: Not on file    Emotionally Abused: Not on file    Physically Abused: Not on file    Sexually Abused: Not on file   Housing Stability:     Unable to Pay for Housing in the Last Year: Not on file    Number of [place] in the Last Year: Not on file    Unstable Housing in the Last Year: Not on file         Ms. Elizabeth follows up in the clinic for generalized anxiety disorder. She is prescribed Xanax 1 mg take 1 tablet twice daily as needed for anxiety. Patient last visited the clinic October 15, 2021. Patient presents to the clinic unaccompanied, clean fully alert and oriented. Gait is stable with assistance of a cane. It is noted patient has history of falls. Last fall 1 month ago which she relates to wearing slippers around the house. Patient reports that she does not wear slippers around the house anymore. She was seen by her medical provider and sent for x-ray which she reports she was negative for fractures. She reports stable mood, sleep and appetite. No psychotic symptoms observed or reported. No suicidal/homicidal thinking noted, risk for suicide is low, protective factor-family. She reports overall she has been doing well and is requesting to continue medications as prescribed. Patient reports she is not interested in Xanax trial dose reduction. She is aware of risks associated with Xanax use. Patient reports good family and social support. Review of Systems   Musculoskeletal: Positive for falls (One month ago-last fall), joint pain and myalgias. Ambulates with assistance of cane   All other systems reviewed and are negative.         Visit Vitals  Wt 86.2 kg (190 lb)   BMI 30.67 kg/m²     Physical Exam  Psychiatric:         Attention and Perception: Attention and perception normal.         Mood and Affect: Mood and affect normal.         Speech: Speech normal.         Behavior: Behavior normal. Behavior is cooperative. Thought Content: Thought content normal.         Cognition and Memory: Cognition and memory normal.         Judgment: Judgment normal.        Plan:    Continue Xanax as prescribed. Follow-up with medical provider as appropriate. For emergencies-call 911 or go to the emergency department.

## 2022-04-27 ENCOUNTER — OFFICE VISIT (OUTPATIENT)
Dept: PRIMARY CARE CLINIC | Age: 82
End: 2022-04-27
Payer: MEDICARE

## 2022-04-27 DIAGNOSIS — M81.0 AGE-RELATED OSTEOPOROSIS WITHOUT CURRENT PATHOLOGICAL FRACTURE: Primary | ICD-10-CM

## 2022-04-27 PROCEDURE — 96401 CHEMO ANTI-NEOPL SQ/IM: CPT | Performed by: NURSE PRACTITIONER

## 2022-04-27 PROCEDURE — 96372 THER/PROPH/DIAG INJ SC/IM: CPT | Performed by: NURSE PRACTITIONER

## 2022-06-15 DIAGNOSIS — F41.9 ANXIETY DISORDER, UNSPECIFIED: ICD-10-CM

## 2022-06-16 RX ORDER — ALPRAZOLAM 1 MG/1
TABLET ORAL
Qty: 60 TABLET | Refills: 2 | Status: SHIPPED | OUTPATIENT
Start: 2022-06-16 | End: 2022-09-06 | Stop reason: SDUPTHER

## 2022-06-27 DIAGNOSIS — E78.5 HYPERLIPIDEMIA, UNSPECIFIED HYPERLIPIDEMIA TYPE: ICD-10-CM

## 2022-06-27 RX ORDER — ATORVASTATIN CALCIUM 40 MG/1
TABLET, FILM COATED ORAL
Qty: 90 TABLET | Refills: 0 | Status: SHIPPED | OUTPATIENT
Start: 2022-06-27 | End: 2022-09-13 | Stop reason: ALTCHOICE

## 2022-07-14 DIAGNOSIS — J45.20 MILD INTERMITTENT ASTHMA WITHOUT COMPLICATION: ICD-10-CM

## 2022-07-14 RX ORDER — ALBUTEROL SULFATE 90 UG/1
2 AEROSOL, METERED RESPIRATORY (INHALATION)
Qty: 1 EACH | Refills: 3 | Status: SHIPPED | OUTPATIENT
Start: 2022-07-14

## 2022-09-06 ENCOUNTER — NURSE TRIAGE (OUTPATIENT)
Dept: OTHER | Facility: CLINIC | Age: 82
End: 2022-09-06

## 2022-09-06 ENCOUNTER — TELEPHONE (OUTPATIENT)
Dept: BEHAVIORAL/MENTAL HEALTH CLINIC | Age: 82
End: 2022-09-06

## 2022-09-06 ENCOUNTER — TELEPHONE (OUTPATIENT)
Dept: PRIMARY CARE CLINIC | Age: 82
End: 2022-09-06

## 2022-09-06 DIAGNOSIS — F41.1 GENERALIZED ANXIETY DISORDER: ICD-10-CM

## 2022-09-06 RX ORDER — ALPRAZOLAM 1 MG/1
TABLET ORAL
Qty: 60 TABLET | Refills: 2 | Status: SHIPPED | OUTPATIENT
Start: 2022-09-06

## 2022-09-06 NOTE — TELEPHONE ENCOUNTER
Received call from Mario at [facility] with Red Flag Complaint. Subjective: Caller states \"I feel like I have no energy. A lot of times I have to lay down. Every morning I have a headache. After I eat breakfast and take my pills I take Ibuprofen and that takes care of the headache. My left hip or rear end hurts. I have had 4 falls in the last 2 years. \"     Current Symptoms: loss of energy and fatigue.good appetite. intermittent heaviness in chest and SOB, history of COPD. Intermittent episodes of dizziness that last a few seconds that resolve when sit down. Onset: a few months ago; unchanged    Associated Symptoms: headaches in the morning. Left temple, forehead. Pain Severity: 0/10; at this time. When she does have pain, \"right I the middle\"--\"a steady hurting\"; intermittent in the morning    Temperature: denies     What has been tried: Ibuprofen for headaches. Calcium with vit D 1-2 times a daily. LMP: NA Pregnant: NA    Recommended disposition: See PCP within 3 Days    Care advice provided, patient verbalizes understanding; denies any other questions or concerns; instructed to call back for any new or worsening symptoms. Patient/Caller agrees with recommended disposition; writer provided warm transfer to Trina Caro at [facility] for appointment scheduling    Attention Provider: Thank you for allowing me to participate in the care of your patient. The patient was connected to triage in response to information provided to the [facility]. Please do not respond through this encounter as the response is not directed to a shared pool.         Reason for Disposition   MILD weakness (i.e., does not interfere with ability to work, go to school, normal activities) and persists > 1 week    Protocols used: Weakness (Generalized) and Fatigue-ADULT-[place]

## 2022-09-13 ENCOUNTER — OFFICE VISIT (OUTPATIENT)
Dept: PRIMARY CARE CLINIC | Age: 82
End: 2022-09-13
Payer: MEDICARE

## 2022-09-13 VITALS
DIASTOLIC BLOOD PRESSURE: 75 MMHG | SYSTOLIC BLOOD PRESSURE: 139 MMHG | BODY MASS INDEX: 30.82 KG/M2 | HEIGHT: 66 IN | RESPIRATION RATE: 18 BRPM | OXYGEN SATURATION: 98 % | WEIGHT: 191.8 LBS | HEART RATE: 72 BPM | TEMPERATURE: 97.2 F

## 2022-09-13 DIAGNOSIS — R53.83 FATIGUE, UNSPECIFIED TYPE: ICD-10-CM

## 2022-09-13 DIAGNOSIS — Z86.73 HISTORY OF CVA (CEREBROVASCULAR ACCIDENT): ICD-10-CM

## 2022-09-13 DIAGNOSIS — K21.9 GASTROESOPHAGEAL REFLUX DISEASE WITHOUT ESOPHAGITIS: ICD-10-CM

## 2022-09-13 DIAGNOSIS — M81.0 AGE-RELATED OSTEOPOROSIS WITHOUT CURRENT PATHOLOGICAL FRACTURE: ICD-10-CM

## 2022-09-13 DIAGNOSIS — E53.8 VITAMIN B12 DEFICIENCY: ICD-10-CM

## 2022-09-13 DIAGNOSIS — I25.10 CORONARY ARTERY DISEASE INVOLVING NATIVE HEART WITHOUT ANGINA PECTORIS, UNSPECIFIED VESSEL OR LESION TYPE: ICD-10-CM

## 2022-09-13 DIAGNOSIS — E78.5 HYPERLIPIDEMIA, UNSPECIFIED HYPERLIPIDEMIA TYPE: Primary | ICD-10-CM

## 2022-09-13 DIAGNOSIS — H60.8X1 CHRONIC ECZEMATOUS OTITIS EXTERNA OF RIGHT EAR: ICD-10-CM

## 2022-09-13 DIAGNOSIS — G47.00 INSOMNIA, UNSPECIFIED TYPE: ICD-10-CM

## 2022-09-13 DIAGNOSIS — M54.50 CHRONIC MIDLINE LOW BACK PAIN, UNSPECIFIED WHETHER SCIATICA PRESENT: ICD-10-CM

## 2022-09-13 DIAGNOSIS — E55.9 VITAMIN D DEFICIENCY: ICD-10-CM

## 2022-09-13 DIAGNOSIS — J44.9 CHRONIC OBSTRUCTIVE PULMONARY DISEASE, UNSPECIFIED COPD TYPE (HCC): ICD-10-CM

## 2022-09-13 DIAGNOSIS — R06.09 DYSPNEA ON EXERTION: ICD-10-CM

## 2022-09-13 DIAGNOSIS — Z12.31 SCREENING MAMMOGRAM, ENCOUNTER FOR: ICD-10-CM

## 2022-09-13 DIAGNOSIS — F41.9 ANXIETY: ICD-10-CM

## 2022-09-13 DIAGNOSIS — Z85.3 HISTORY OF BREAST CANCER: ICD-10-CM
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
## 2022-09-13 DIAGNOSIS — I34.0 NONRHEUMATIC MITRAL VALVE REGURGITATION: ICD-10-CM

## 2022-09-13 DIAGNOSIS — I10 ESSENTIAL HYPERTENSION: ICD-10-CM

## 2022-09-13 DIAGNOSIS — G89.29 CHRONIC MIDLINE LOW BACK PAIN, UNSPECIFIED WHETHER SCIATICA PRESENT: ICD-10-CM

## 2022-09-13 PROCEDURE — 1090F PRES/ABSN URINE INCON ASSESS: CPT | Performed by: NURSE PRACTITIONER

## 2022-09-13 PROCEDURE — G8417 CALC BMI ABV UP PARAM F/U: HCPCS | Performed by: NURSE PRACTITIONER

## 2022-09-13 PROCEDURE — G8432 DEP SCR NOT DOC, RNG: HCPCS | Performed by: NURSE PRACTITIONER

## 2022-09-13 PROCEDURE — 1123F ACP DISCUSS/DSCN MKR DOCD: CPT | Performed by: NURSE PRACTITIONER

## 2022-09-13 PROCEDURE — 99214 OFFICE O/P EST MOD 30 MIN: CPT | Performed by: NURSE PRACTITIONER

## 2022-09-13 PROCEDURE — G8427 DOCREV CUR MEDS BY ELIG CLIN: HCPCS | Performed by: NURSE PRACTITIONER

## 2022-09-13 PROCEDURE — G8754 DIAS BP LESS 90: HCPCS | Performed by: NURSE PRACTITIONER

## 2022-09-13 PROCEDURE — G8536 NO DOC ELDER MAL SCRN: HCPCS | Performed by: NURSE PRACTITIONER

## 2022-09-13 PROCEDURE — G8752 SYS BP LESS 140: HCPCS | Performed by: NURSE PRACTITIONER

## 2022-09-13 PROCEDURE — 1101F PT FALLS ASSESS-DOCD LE1/YR: CPT | Performed by: NURSE PRACTITIONER

## 2022-09-13 RX ORDER — MIRTAZAPINE 15 MG/1
15 TABLET, FILM COATED ORAL
Qty: 90 TABLET | Refills: 1 | Status: SHIPPED | OUTPATIENT
Start: 2022-09-13 | End: 2022-10-12 | Stop reason: SINTOL

## 2022-09-13 RX ORDER — FLUOCINOLONE ACETONIDE 0.11 MG/ML
2 OIL AURICULAR (OTIC) DAILY
Qty: 20 ML | Refills: 0 | Status: SHIPPED | OUTPATIENT
Start: 2022-09-13 | End: 2022-09-20

## 2022-09-13 RX ORDER — ATORVASTATIN CALCIUM 20 MG/1
20 TABLET, FILM COATED ORAL DAILY
Qty: 90 TABLET | Refills: 1 | Status: SHIPPED | OUTPATIENT
Start: 2022-09-13

## 2022-09-13 NOTE — PROGRESS NOTES
Rosa Kingston is a 80 y.o. female who presents to the office today for the following:    Chief Complaint   Patient presents with    Fatigue    COPD       Past Medical History:   Diagnosis Date    Anemia 6/24/2020    Anxiety 6/24/2020    Arthritis 6/24/2020    Breast cancer ([facility] [address] 6/24/2020    GERD (gastroesophageal reflux disease) 6/24/2020    Heart disease 6/24/2020    Hyperlipidemia 6/24/2020    Hypertension 6/24/2020    Sleep disorder 6/24/2020    Stroke (cerebrum) ([facility] [address] 6/24/2020 2012       Past Surgical History:   Procedure Laterality Date    HX BREAST LUMPECTOMY  1996    HX TUBAL LIGATION      ND REVISION AMBROSE-IMPLANT CAPSULE BREAST          Family History   Problem Relation Age of Onset    Hypertension Mother     Hypertension Sister     Alzheimer's Disease Sister     Cancer Brother         Social History     Tobacco Use    Smoking status: Former    Smokeless tobacco: Never   Substance Use Topics    Alcohol use: Not Currently    Drug use: Never        HPI  Patient here today with several concerns with PMH of hyperlipidemia, GERD, hypertension, cva, anxiety, CAD, MV regurgitation, osteoporosis,vitamin d deficiency , copd, recurrent falls,breast cancer and L1 fracture. Does see cardiology at [facility] and had last follow up 1 mo ago. Also has seen Amisha Marr for anxiety. Reports concerns with fatigue and shortness of breath which have been ongoing problem since original fall in 4/21. Was recommended last month by cardiologist to see pulmonologist due to history of copd but has not been able to get transportation out of town. Feels that she gets tired within few minutes of exerting self with normal ADL's. Denies any associated chest pain however. Has had some back pain which has led to some weakness in legs but no numbness, giving out of legs or bowel/bladder changes. Appetite has been ok. Not sleeping well and tosses and turns a lot during not. Does not wake up feeling rested.  No known snoring but does wake up trying to catch breath at times. Never evaluated for sleep apnea in past.  Experiences anxiety for which she takes alprazolam as needed. Reports some intermittent symptoms during the day and takes this at least once per day. Is using cane with ambulating as she does feel off balance. Denies anymore falls since her last visit. Current Outpatient Medications on File Prior to Visit   Medication Sig    ALPRAZolam (XANAX) 1 mg tablet TAKE 1 TAB BY MOUTH 2 TIMES DAILY AS NEEDED FOR ANXIETY FOR UP TO 30 DAYS. MAX DAILY AMOUNT 2 MG    albuterol (PROVENTIL HFA, VENTOLIN HFA, PROAIR HFA) 90 mcg/actuation inhaler Take 2 Puffs by inhalation every six (6) hours as needed for Wheezing. denosumab (PROLIA) 60 mg/mL injection 1 mL by SubCUTAneous route every 6 months. omeprazole (PRILOSEC) 40 mg capsule Take 1 Capsule by mouth daily. melatonin 3 mg tablet Take 9 mg by mouth nightly as needed for Insomnia. cholecalciferol (Vitamin D3) 25 mcg (1,000 unit) cap Take 1,000 Units by mouth daily. (Patient not taking: Reported on 3/30/2022)    aspirin delayed-release 81 mg tablet Take  by mouth daily. No current facility-administered medications on file prior to visit. Medications Ordered Today   Medications    mirtazapine (REMERON) 15 mg tablet     Sig: Take 1 Tablet by mouth nightly. Dispense:  90 Tablet     Refill:  1    atorvastatin (LIPITOR) 20 mg tablet     Sig: Take 1 Tablet by mouth daily. Dispense:  90 Tablet     Refill:  1    fluocinolone acetonide oiL 0.01 % drop     Sig: Administer 2 Drops in right ear daily for 7 days. Dispense:  20 mL     Refill:  0        Review of Systems   Constitutional:  Positive for malaise/fatigue. Negative for chills, fever and weight loss. HENT: Negative. Eyes: Negative. Respiratory:  Positive for shortness of breath. Negative for cough, hemoptysis, sputum production and wheezing. Cardiovascular: Negative. Gastrointestinal: Negative. Genitourinary: Negative. Musculoskeletal:  Positive for back pain and myalgias. Skin: Negative. Neurological:  Positive for weakness. Negative for dizziness, tingling, sensory change, speech change, focal weakness, seizures, loss of consciousness and headaches. Psychiatric/Behavioral:  Negative for depression, memory loss, substance abuse and suicidal ideas. The patient is nervous/anxious and has insomnia. Visit Vitals  /75 (BP 1 Location: Left upper arm, BP Patient Position: Sitting, BP Cuff Size: Small adult)   Pulse 72   Temp 97.2 °F (36.2 °C) (Temporal)   Resp 18   Ht 5' 6\" (1.676 m)   Wt 191 lb 12.8 oz (87 kg)   SpO2 98%   BMI 30.96 kg/m²       Physical Exam  Vitals and nursing note reviewed. Constitutional:       Appearance: Normal appearance. Cardiovascular:      Rate and Rhythm: Normal rate and regular rhythm. Pulses: Normal pulses. Heart sounds: Murmur heard. Pulmonary:      Effort: Pulmonary effort is normal.   Abdominal:      General: Bowel sounds are normal.      Palpations: Abdomen is soft. Tenderness: There is no abdominal tenderness. There is no guarding. Musculoskeletal:      Cervical back: Normal.      Thoracic back: Normal.      Lumbar back: Tenderness present. Decreased range of motion. Legs:    Skin:     General: Skin is warm and dry. Neurological:      Mental Status: She is alert. Cranial Nerves: Cranial nerves are intact. Sensory: Sensation is intact. Motor: Motor function is intact. Coordination: Coordination is intact. Gait: Gait abnormal.      Deep Tendon Reflexes: Abnormal reflex: unable to assess.       Comments: Uses cane   Psychiatric:         Mood and Affect: Mood normal.         Behavior: Behavior normal.          1. Hyperlipidemia, unspecified hyperlipidemia type  Lab Results   Component Value Date/Time    LDL, calculated 84 09/13/2022 09:30 AM   Stable but going to decrease to 20mg daily as this was a change for her possibly since the fatigue started  - atorvastatin (LIPITOR) 20 mg tablet; Take 1 Tablet by mouth daily. Dispense: 90 Tablet; Refill: 0    2. Gastroesophageal reflux disease without esophagitis  Controlled and on prilosec as directed    3. Essential hypertension  Blood pressure is controlled and continue medication as directed  Monitor readings at home and notify provider if > 140/90      4. COPD ([address]  Does report sob but no wheezing or cough  Has albuterol inhaler she uses prn   Recommended by cardiology to follow up with pulmonology  She has had difficulty with transportation but may be able to go to provider in [place] so referral sent    5. Vitamin D deficiency  Lab Results   Component Value Date/Time    VITAMIN D, 25-HYDROXY 54.1 09/13/2022 09:30 AM   Continues vitamin d and calcium supplement     6. Anxiety  Uses alprazolam prn and follows with Patrizia Domingo    7. History of CVA (cerebrovascular accident)  On ASA 81mg     8. Age-related osteoporosis without current pathological fracture  On prolia injections every 6 months    9. Coronary artery disease involving native heart without angina pectoris, unspecified vessel or lesion type  No hx of stents  On ASA and atorvastatin  Follows with [facility]    10. Nonrheumatic mitral valve regurgitation  07/28/21    ECHO ADULT COMPLETE 07/28/2021 7/28/2021    Interpretation Summary  · LV: Estimated LVEF is 60 - 65%. Normal cavity size, wall thickness and systolic function (ejection fraction normal). Wall motion: normal. Age-appropriate left ventricular diastolic function. · LA: Mildly dilated left atrium. Left Atrium volume index is 31 mL/m2. · AV: Mild aortic valve leaflet calcification present without reduced excursion. · MV: Mild mitral valve regurgitation is present. · TV: Right Ventricular Arterial Pressure (RVSP) is 36 mmHg. Signed by: Basil Callejas MD on 7/28/2021  3:21 PM  Follows with [facility]    11. Fall, initial encounter  Continue using cane     12. Chronic  low back pain without sciatica  XR Results (most recent):  Results from Hospital Encounter encounter on 09/15/22    XR CHEST PA LAT    Narrative  Indication:  Dyspnea. Exam: PA and lateral views of the chest.    There is no prior chest x-ray for direct comparison. Comparison is made to prior  CT of the abdomen and pelvis, including the lower chest, dated April 2021. Findings: Cardiomediastinal silhouette is within normal limits. There is mild  biapical pleural thickening. Lungs are otherwise clear. There is no pleural  fluid. There is no pneumothorax. Osseous structures are diffusely demineralized. There is an upper lumbar moderate compression deformity, new compared to prior  CT dated April 2021. Impression  No acute cardiopulmonary disease. Tenderness on exam and does have h/o L1 fracture for which she was evaluated by Dr. Thornton Knows  No significant radicular symptoms  Avoid activity that may strain or aggravate affected area  May use tylenol prn for pain      13. History of breast cancer  Initially did not want to continue monitoring and mammograms  Today states that she would like to have mammogram ordered and will send    14. Fatigue, unspecified type  Likely multifactorial due to multiple chronic conditions likely contributing  Checking labwork  She does report difficulty sleeping,daytime fatigue and possible snoring  Referring to pulmonology to evaluate further for dyspnea and sleep study    15. Insomnia, unspecified type  Encouraged other  sleep hygiene measures such as warm baths, avoiding afternoon caffeine, reading versus tv/electronic use before bed and getting out of bed if not asleep within 30 minutes     16. Chronic eczematous otitis externa of right ear  Use as directed x 7 days   - fluocinolone acetonide oiL 0.01 % drop; Administer 2 Drops in right ear daily for 7 days. Dispense: 20 mL; Refill: 0    17.  Dyspnea on exertion  Seems this is not cardiac related and was recommended for pulmonary eval  Will check BNP and CXR  Also sending referral to pulmonology   - BNP  - XR CHEST PA LAT; Future  - REFERRAL TO PULMONARY DISEASE    18. Vitamin B12 deficiency  Continue b12 supplement  Check b12 level  - VITAMIN B12 & FOLATE    19. Screening mammogram, encounter for  Hx of breast cancer as noted above  Check mammogram  - [facility] MAMMO BI SCREENING INCL CAD; Future     Patient verbalizes understanding of plan of care as discussed above and informed if develops worsening symptoms such as chest pain  to follow up immediately with provider or seek emergency care if needed. Follow-up and Dispositions    Return in about 4 weeks (around 10/11/2022) for or sooner for worsening symptoms.

## 2022-09-13 NOTE — PROGRESS NOTES
Chief Complaint   Patient presents with    Fatigue    COPD       1. Have you been to the ER, urgent care clinic since your last visit? Hospitalized since your last visit? No    2. Have you seen or consulted any other health care providers outside of the [address] [place] [address] since your last visit? Include any pap smears or colon screening.   In chart

## 2022-09-14 LAB
25(OH)D3+25(OH)D2 SERPL-MCNC: 54.1 NG/ML (ref 30–100)
ALBUMIN SERPL-MCNC: 4.2 G/DL (ref 3.6–4.6)
ALBUMIN/GLOB SERPL: 1.8 {RATIO} (ref 1.2–2.2)
ALP SERPL-CCNC: 80 IU/L (ref 44–121)
ALT SERPL-CCNC: 11 IU/L (ref 0–32)
AST SERPL-CCNC: 17 IU/L (ref 0–40)
BASOPHILS # BLD AUTO: 0 X10E3/UL (ref 0–0.2)
BASOPHILS NFR BLD AUTO: 1 %
BILIRUB SERPL-MCNC: 0.3 MG/DL (ref 0–1.2)
BNP SERPL-MCNC: 170.9 PG/ML (ref 0–100)
BUN SERPL-MCNC: 24 MG/DL (ref 8–27)
BUN/CREAT SERPL: 28 (ref 12–28)
CALCIUM SERPL-MCNC: 9.3 MG/DL (ref 8.7–10.3)
CHLORIDE SERPL-SCNC: 104 MMOL/L (ref 96–106)
CHOLEST SERPL-MCNC: 167 MG/DL (ref 100–199)
CO2 SERPL-SCNC: 23 MMOL/L (ref 20–29)
CREAT SERPL-MCNC: 0.87 MG/DL (ref 0.57–1)
EGFR: 67 ML/MIN/1.73
EOSINOPHIL # BLD AUTO: 0.3 X10E3/UL (ref 0–0.4)
EOSINOPHIL NFR BLD AUTO: 5 %
ERYTHROCYTE [DISTWIDTH] IN BLOOD BY AUTOMATED COUNT: 13.6 % (ref 11.7–15.4)
FOLATE SERPL-MCNC: 18.6 NG/ML
GLOBULIN SER CALC-MCNC: 2.3 G/DL (ref 1.5–4.5)
GLUCOSE SERPL-MCNC: 97 MG/DL (ref 65–99)
HCT VFR BLD AUTO: 35.4 % (ref 34–46.6)
HDLC SERPL-MCNC: 58 MG/DL
HGB BLD-MCNC: 11.3 G/DL (ref 11.1–15.9)
IMM GRANULOCYTES # BLD AUTO: 0 X10E3/UL (ref 0–0.1)
IMM GRANULOCYTES NFR BLD AUTO: 0 %
LDLC SERPL CALC-MCNC: 84 MG/DL (ref 0–99)
LYMPHOCYTES # BLD AUTO: 1.6 X10E3/UL (ref 0.7–3.1)
LYMPHOCYTES NFR BLD AUTO: 33 %
MCH RBC QN AUTO: 28.3 PG (ref 26.6–33)
MCHC RBC AUTO-ENTMCNC: 31.9 G/DL (ref 31.5–35.7)
MCV RBC AUTO: 89 FL (ref 79–97)
MONOCYTES # BLD AUTO: 0.4 X10E3/UL (ref 0.1–0.9)
MONOCYTES NFR BLD AUTO: 9 %
NEUTROPHILS # BLD AUTO: 2.5 X10E3/UL (ref 1.4–7)
NEUTROPHILS NFR BLD AUTO: 52 %
PLATELET # BLD AUTO: 172 X10E3/UL (ref 150–450)
POTASSIUM SERPL-SCNC: 4.2 MMOL/L (ref 3.5–5.2)
PROT SERPL-MCNC: 6.5 G/DL (ref 6–8.5)
RBC # BLD AUTO: 3.99 X10E6/UL (ref 3.77–5.28)
SODIUM SERPL-SCNC: 142 MMOL/L (ref 134–144)
SPECIMEN STATUS REPORT, ROLRST: NORMAL
TRIGL SERPL-MCNC: 146 MG/DL (ref 0–149)
TSH SERPL DL<=0.005 MIU/L-ACNC: 3.26 UIU/ML (ref 0.45–4.5)
VIT B12 SERPL-MCNC: 325 PG/ML (ref 232–1245)
VLDLC SERPL CALC-MCNC: 25 MG/DL (ref 5–40)
WBC # BLD AUTO: 4.9 X10E3/UL (ref 3.4–10.8)

## 2022-09-15 ENCOUNTER — HOSPITAL ENCOUNTER (OUTPATIENT)
Dept: GENERAL RADIOLOGY | Age: 82
Discharge: HOME | End: 2022-09-15
Payer: MEDICARE

## 2022-09-15 DIAGNOSIS — R06.09 DYSPNEA ON EXERTION: ICD-10-CM

## 2022-09-15 PROCEDURE — 71046 X-RAY EXAM CHEST 2 VIEWS: CPT

## 2022-09-16 LAB
APPEARANCE UR: ABNORMAL
BACTERIA #/AREA URNS HPF: ABNORMAL /[HPF]
BILIRUB UR QL STRIP: NEGATIVE
CASTS URNS QL MICRO: ABNORMAL /LPF
COLOR UR: YELLOW
CRYSTALS URNS MICRO: ABNORMAL
EPI CELLS #/AREA URNS HPF: >10 /HPF (ref 0–10)
GLUCOSE UR QL STRIP: NEGATIVE
HGB UR QL STRIP: NEGATIVE
KETONES UR QL STRIP: ABNORMAL
LEUKOCYTE ESTERASE UR QL STRIP: ABNORMAL
MICRO URNS: ABNORMAL
NITRITE UR QL STRIP: NEGATIVE
PH UR STRIP: 6 [PH] (ref 5–7.5)
PROT UR QL STRIP: ABNORMAL
RBC #/AREA URNS HPF: ABNORMAL /HPF (ref 0–2)
SP GR UR STRIP: 1.02 (ref 1–1.03)
UNIDENT CRYS URNS QL MICRO: PRESENT
UROBILINOGEN UR STRIP-MCNC: 0.2 MG/DL (ref 0.2–1)
WBC #/AREA URNS HPF: ABNORMAL /HPF (ref 0–5)

## 2022-09-24 DIAGNOSIS — I10 ESSENTIAL HYPERTENSION: ICD-10-CM

## 2022-09-25 RX ORDER — METOPROLOL SUCCINATE 25 MG/1
TABLET, EXTENDED RELEASE ORAL
Qty: 180 TABLET | Refills: 1 | Status: SHIPPED | OUTPATIENT
Start: 2022-09-25

## 2022-10-03 NOTE — PROGRESS NOTES
Informed patient that Cardiologist also recommended the Pulmonology referral but it was her choice per SUZANNE Horan NP. Patient stated she was still going to cancel it because she could not handle it right now.

## 2022-10-03 NOTE — PROGRESS NOTES
Patient informed of x ray result. She stated she was going to cancel the referral to the Pulmonary Specialist.  She did not know you were going to do that per patient.

## 2022-10-12 ENCOUNTER — OFFICE VISIT (OUTPATIENT)
Dept: PRIMARY CARE CLINIC | Age: 82
End: 2022-10-12
Payer: MEDICARE

## 2022-10-12 VITALS
RESPIRATION RATE: 18 BRPM | WEIGHT: 194.4 LBS | HEART RATE: 80 BPM | TEMPERATURE: 97.4 F | HEIGHT: 66 IN | BODY MASS INDEX: 31.24 KG/M2 | DIASTOLIC BLOOD PRESSURE: 56 MMHG | SYSTOLIC BLOOD PRESSURE: 134 MMHG | OXYGEN SATURATION: 97 %

## 2022-10-12 DIAGNOSIS — Z00.00 MEDICARE ANNUAL WELLNESS VISIT, SUBSEQUENT: ICD-10-CM

## 2022-10-12 DIAGNOSIS — J44.9 CHRONIC OBSTRUCTIVE PULMONARY DISEASE, UNSPECIFIED COPD TYPE (HCC): ICD-10-CM

## 2022-10-12 DIAGNOSIS — F41.9 ANXIETY: ICD-10-CM

## 2022-10-12 DIAGNOSIS — R53.83 FATIGUE, UNSPECIFIED TYPE: Primary | ICD-10-CM

## 2022-10-12 DIAGNOSIS — G47.00 INSOMNIA, UNSPECIFIED TYPE: ICD-10-CM

## 2022-10-12 PROCEDURE — 99213 OFFICE O/P EST LOW 20 MIN: CPT | Performed by: NURSE PRACTITIONER

## 2022-10-12 PROCEDURE — G8417 CALC BMI ABV UP PARAM F/U: HCPCS | Performed by: NURSE PRACTITIONER

## 2022-10-12 PROCEDURE — G8432 DEP SCR NOT DOC, RNG: HCPCS | Performed by: NURSE PRACTITIONER

## 2022-10-12 PROCEDURE — G8752 SYS BP LESS 140: HCPCS | Performed by: NURSE PRACTITIONER

## 2022-10-12 PROCEDURE — 1101F PT FALLS ASSESS-DOCD LE1/YR: CPT | Performed by: NURSE PRACTITIONER

## 2022-10-12 PROCEDURE — G0439 PPPS, SUBSEQ VISIT: HCPCS | Performed by: NURSE PRACTITIONER

## 2022-10-12 PROCEDURE — G8536 NO DOC ELDER MAL SCRN: HCPCS | Performed by: NURSE PRACTITIONER

## 2022-10-12 PROCEDURE — 1123F ACP DISCUSS/DSCN MKR DOCD: CPT | Performed by: NURSE PRACTITIONER

## 2022-10-12 PROCEDURE — 1090F PRES/ABSN URINE INCON ASSESS: CPT | Performed by: NURSE PRACTITIONER

## 2022-10-12 PROCEDURE — G8427 DOCREV CUR MEDS BY ELIG CLIN: HCPCS | Performed by: NURSE PRACTITIONER

## 2022-10-12 PROCEDURE — G8754 DIAS BP LESS 90: HCPCS | Performed by: NURSE PRACTITIONER

## 2022-10-12 RX ORDER — MIRTAZAPINE 7.5 MG/1
7.5 TABLET, FILM COATED ORAL
COMMUNITY

## 2022-10-12 NOTE — PROGRESS NOTES
Kailyn aPge is a 80 y.o. female who presents to the office today for the following:    Chief Complaint   Patient presents with    Medication Evaluation    Sleep Problem       Past Medical History:   Diagnosis Date    Anemia 6/24/2020    Anxiety 6/24/2020    Arthritis 6/24/2020    Breast cancer ([facility] [address] 6/24/2020    GERD (gastroesophageal reflux disease) 6/24/2020    Heart disease 6/24/2020    Hyperlipidemia 6/24/2020    Hypertension 6/24/2020    Sleep disorder 6/24/2020    Stroke (cerebrum) ([facility] [address] 6/24/2020 2012       Past Surgical History:   Procedure Laterality Date    HX BREAST LUMPECTOMY  1996    HX TUBAL LIGATION      [place] REVISION AMBROSE-IMPLANT CAPSULE BREAST          Family History   Problem Relation Age of Onset    Hypertension Mother     Hypertension Sister     Alzheimer's Disease Sister     Cancer Brother         Social History     Tobacco Use    Smoking status: Former    Smokeless tobacco: Never   Substance Use Topics    Alcohol use: Not Currently    Drug use: Never        HPI  Patient here today with several concerns with PMH of hyperlipidemia, GERD, hypertension, cva, anxiety, CAD, MV regurgitation, osteoporosis,vitamin d deficiency , copd, recurrent falls,breast cancer and L1 fracture. Does see cardiology at [facility] and had last follow up 1 mo ago. Also has seen Jelly Marshall for anxiety. Reports concerns with fatigue and shortness of breath which have been ongoing problem since original fall in 4/21. Was recommended last month by cardiologist to see pulmonologist due to history of copd but has not been able to get transportation out of town. Feels that she gets tired within few minutes of exerting self with normal ADL's. Denies any associated chest pain however. Has had some back pain which has led to some weakness in legs but no numbness, giving out of legs or bowel/bladder changes. Appetite has been ok. Not sleeping well and tosses and turns a lot during not.  Does not wake up feeling rested. No known snoring but does wake up trying to catch breath at times. Never evaluated for sleep apnea in past.  Experiences anxiety for which she takes alprazolam as needed. Reports some intermittent symptoms during the day and takes this at least once per day. Is using cane with ambulating as she does feel off balance. Denies anymore falls since her last visit. Current Outpatient Medications on File Prior to Visit   Medication Sig    metoprolol succinate (TOPROL-XL) 25 mg XL tablet TAKE 1 TABLET BY MOUTH TWO TIMES A DAY. atorvastatin (LIPITOR) 20 mg tablet Take 1 Tablet by mouth daily. [DISCONTINUED] mirtazapine (REMERON) 15 mg tablet Take 1 Tablet by mouth nightly. ALPRAZolam (XANAX) 1 mg tablet TAKE 1 TAB BY MOUTH 2 TIMES DAILY AS NEEDED FOR ANXIETY FOR UP TO 30 DAYS. MAX DAILY AMOUNT 2 MG    albuterol (PROVENTIL HFA, VENTOLIN HFA, PROAIR HFA) 90 mcg/actuation inhaler Take 2 Puffs by inhalation every six (6) hours as needed for Wheezing. denosumab (PROLIA) 60 mg/mL injection 1 mL by SubCUTAneous route every 6 months. omeprazole (PRILOSEC) 40 mg capsule Take 1 Capsule by mouth daily. melatonin 3 mg tablet Take 9 mg by mouth nightly as needed for Insomnia. cholecalciferol (Vitamin D3) 25 mcg (1,000 unit) cap Take 1,000 Units by mouth daily. (Patient not taking: Reported on 3/30/2022)    aspirin delayed-release 81 mg tablet Take  by mouth daily. No current facility-administered medications on file prior to visit. No orders of the defined types were placed in this encounter. Review of Systems   Constitutional:  Positive for malaise/fatigue. Negative for chills, fever and weight loss. HENT: Negative. Eyes: Negative. Respiratory:  Positive for shortness of breath. Negative for cough, hemoptysis, sputum production and wheezing. Cardiovascular: Negative. Gastrointestinal: Negative. Genitourinary: Negative.     Musculoskeletal:  Positive for back pain and myalgias. Skin: Negative. Neurological:  Positive for weakness. Negative for dizziness, tingling, sensory change, speech change, focal weakness, seizures, loss of consciousness and headaches. Psychiatric/Behavioral:  Negative for depression, memory loss, substance abuse and suicidal ideas. The patient is nervous/anxious and has insomnia. Visit Vitals  BP (!) 134/56 (BP 1 Location: Left upper arm, BP Patient Position: Sitting, BP Cuff Size: Adult)   Pulse 80   Temp 97.4 °F (36.3 °C) (Temporal)   Resp 18   Ht 5' 6\" (1.676 m)   Wt 194 lb 6.4 oz (88.2 kg)   SpO2 97%   BMI 31.38 kg/m²       Physical Exam  Vitals and nursing note reviewed. Constitutional:       Appearance: Normal appearance. Cardiovascular:      Rate and Rhythm: Normal rate and regular rhythm. Pulses: Normal pulses. Heart sounds: Murmur heard. Pulmonary:      Effort: Pulmonary effort is normal.   Abdominal:      General: Bowel sounds are normal.      Palpations: Abdomen is soft. Tenderness: There is no abdominal tenderness. There is no guarding. Musculoskeletal:      Cervical back: Normal.        Legs:    Skin:     General: Skin is warm and dry. Neurological:      Comments: Uses cane   Psychiatric:         Mood and Affect: Mood normal.         Behavior: Behavior normal.            1. COPD ([address]  XR Results (most recent):  Results from Hospital Encounter encounter on 09/15/22    XR CHEST PA LAT    Narrative  Indication:  Dyspnea. Exam: PA and lateral views of the chest.    There is no prior chest x-ray for direct comparison. Comparison is made to prior  CT of the abdomen and pelvis, including the lower chest, dated April 2021. Findings: Cardiomediastinal silhouette is within normal limits. There is mild  biapical pleural thickening. Lungs are otherwise clear. There is no pleural  fluid. There is no pneumothorax. Osseous structures are diffusely demineralized.   There is an upper lumbar moderate compression deformity, new compared to prior  CT dated April 2021. Impression  No acute cardiopulmonary disease. Does report sob but no wheezing or cough  CXR clear  Has albuterol inhaler she uses prn   Recommended by cardiology to follow up with pulmonology but  still difficulty with transportation  She had agreed to go to [facility] possibly but now declines any further eval  She will let us know if changes her mind or if worsening symptoms    2. Fatigue, unspecified type  Still feel multifactorial due to multiple chronic conditions likely contributing  Labs were stable 9/13/22  She does report difficulty sleeping,daytime fatigue and possible snoring  Recommended to see  pulmonology to evaluate further for dyspnea and sleep study but now declines any further eval  She will let us know if changes her mind or if worsening symptoms    3. Insomnia, unspecified type  Had remeron added to help further with anxiety and sleep but states this was too [place]. \"  Is taking 1/2 tablet which has done better and will continue  Encouraged other  sleep hygiene measures such as warm baths, avoiding afternoon caffeine, reading versus tv/electronic use before bed and getting out of bed if not asleep within 30 minutes   Still recommended for sleep study as noted above    4. Anxiety  She is taking 1/2 tablet remeron and does feel anxiety has been improved some  Has alprazolam that she uses occasionally        Patient verbalizes understanding of plan of care as discussed above and informed if develops worsening symptoms such as chest pain  to follow up immediately with provider or seek emergency care if needed. Follow-up and Dispositions    Return in about 3 months (around 1/12/2023) for or sooner for worsening symptoms.         This is the Subsequent Medicare Annual Wellness Exam, performed 12 months or more after the Initial AWV or the last Subsequent AWV    I have reviewed the patient's medical history in detail and updated the computerized patient record. Assessment/Plan   Education and counseling provided:  Are appropriate based on today's review and evaluation  End-of-Life planning (with patient's consent)  Pneumococcal Vaccine  Influenza Vaccine  Hepatitis B Vaccine  Screening Mammography  Screening Pap and pelvic (covered once every 2 years)  Colorectal cancer screening tests  Cardiovascular screening blood test  Bone mass measurement (DEXA)  Screening for glaucoma  Diabetes screening test    1. Fatigue, unspecified type  2. Chronic obstructive pulmonary disease, unspecified COPD type ([address]  3. Insomnia, unspecified type  4. Anxiety  5. Medicare annual wellness visit, subsequent       Depression Risk Factor Screening     3 most recent PHQ Screens 10/12/2022   Little interest or pleasure in doing things Several days   Feeling down, depressed, irritable, or hopeless Not at all   Total Score PHQ 2 1       Alcohol & Drug Abuse Risk Screen    Do you average more than 1 drink per night or more than 7 drinks a week:  No    On any one occasion in the past three months have you have had more than 3 drinks containing alcohol:  No          Functional Ability and Level of Safety    Hearing: Hearing is good. Activities of Daily Living: The home contains: handrails  Patient does total self care      Ambulation: with mild difficulty     Fall Risk:  Fall Risk Assessment, last 12 mths 10/12/2022   Able to walk? Yes   Fall in past 12 months? 1   Do you feel unsteady? 0   Are you worried about falling 1   Is TUG test greater than 12 seconds? 0   Is the gait abnormal? 1   Number of falls in past 12 months 1   Fall with injury?  0      Abuse Screen:  Patient is not abused       Cognitive Screening    Has your family/caregiver stated any concerns about your memory: no     Cognitive Screening: Normal - Mini Cog Test    Health Maintenance Due     Health Maintenance Due   Topic Date Due    Pneumococcal 65+ years (1 - PCV) Never done    DTaP/Tdap/Td series (1 - Tdap) Never done    Shingrix Vaccine Age 50> (1 of 2) Never done    COVID-19 Vaccine (3 - Booster for Moderna series) 09/23/2021     Recommended immunizations and screenings for age discussed. She will obtain vaccinations if desires at local pharmacy. Patient Care Team   Patient Care Team:  Brayan Harman NP as PCP - General (Physician Assistant)  Brayan Harman NP as PCP - [facility]to Story Provider  Dora Ahuja MD as Consulting Provider (Orthopedic Surgery)    History     Patient Active Problem List   Diagnosis Code    Anxiety F41.9    Anemia D64.9    Arthritis M19.90    Heart disease I51.9    Hyperlipidemia E78.5    Hypertension I10    Simple chronic bronchitis ([address] J41.0    Sleep disorder G47.9    Stroke (cerebrum) ([address] I63.9    GERD (gastroesophageal reflux disease) K21.9    Fracture of L1 vertebra ([address] S32.019A    Intractable pain R52    L1 vertebral fracture ([address] S32.019A    Age-related osteoporosis without current pathological fracture M81.0     Past Medical History:   Diagnosis Date    Anemia 6/24/2020    Anxiety 6/24/2020    Arthritis 6/24/2020    Breast cancer ([address] 6/24/2020    GERD (gastroesophageal reflux disease) 6/24/2020    Heart disease 6/24/2020    Hyperlipidemia 6/24/2020    Hypertension 6/24/2020    Sleep disorder 6/24/2020    Stroke (cerebrum) ([address] 6/24/2020 2012      Past Surgical History:   Procedure Laterality Date    HX BREAST LUMPECTOMY  1996    HX TUBAL LIGATION      [place] REVISION AMBROSE-IMPLANT CAPSULE BREAST       Current Outpatient Medications   Medication Sig Dispense Refill    metoprolol succinate (TOPROL-XL) 25 mg XL tablet TAKE 1 TABLET BY MOUTH TWO TIMES A DAY. 180 Tablet 1    atorvastatin (LIPITOR) 20 mg tablet Take 1 Tablet by mouth daily. 90 Tablet 1    ALPRAZolam (XANAX) 1 mg tablet TAKE 1 TAB BY MOUTH 2 TIMES DAILY AS NEEDED FOR ANXIETY FOR UP TO 30 DAYS.  MAX DAILY AMOUNT 2 MG 60 Tablet 2    albuterol (PROVENTIL HFA, VENTOLIN HFA, PROAIR HFA) 90 mcg/actuation inhaler Take 2 Puffs by inhalation every six (6) hours as needed for Wheezing. 1 Each 3    denosumab (PROLIA) 60 mg/mL injection 1 mL by SubCUTAneous route every 6 months. 1 mL 1    omeprazole (PRILOSEC) 40 mg capsule Take 1 Capsule by mouth daily. 90 Capsule 1    melatonin 3 mg tablet Take 9 mg by mouth nightly as needed for Insomnia. cholecalciferol (Vitamin D3) 25 mcg (1,000 unit) cap Take 1,000 Units by mouth daily. (Patient not taking: Reported on 3/30/2022)      aspirin delayed-release 81 mg tablet Take  by mouth daily. Allergies   Allergen Reactions    Incruse Ellipta [Umeclidinium] Other (comments)     Throat felt funny and red on the outside on neck area. Pcn [Penicillins] Unknown (comments)     States was in hospital a long time ago (in her 19's). Does'nt remember reaction she had.        Family History   Problem Relation Age of Onset    Hypertension Mother     Hypertension Sister     Alzheimer's Disease Sister     Cancer Brother      Social History     Tobacco Use    Smoking status: Former    Smokeless tobacco: Never   Substance Use Topics    Alcohol use: Not Currently         Shiloh Sinha NP

## 2022-10-12 NOTE — PROGRESS NOTES
Chief Complaint   Patient presents with    Medication Evaluation    Sleep Problem     Pt stated that the  medication for sleep didn't work with her, she didn't like the way she felt. 1. Have you been to the ER, urgent care clinic since your last visit? Hospitalized since your last visit? No    2. Have you seen or consulted any other health care providers outside of the [address] [place] [address] since your last visit? Include any pap smears or colon screening.  No

## 2022-10-18 ENCOUNTER — TELEPHONE (OUTPATIENT)
Dept: PRIMARY CARE CLINIC | Age: 82
End: 2022-10-18

## 2022-10-18 NOTE — TELEPHONE ENCOUNTER
Can you please call pt to schedule a nurse visit to get her prolia injection, just got delivered today.  Thank you

## 2022-11-15 ENCOUNTER — OFFICE VISIT (OUTPATIENT)
Dept: PRIMARY CARE CLINIC | Age: 82
End: 2022-11-15
Payer: MEDICARE

## 2022-11-15 DIAGNOSIS — M81.0 AGE-RELATED OSTEOPOROSIS WITHOUT CURRENT PATHOLOGICAL FRACTURE: Primary | ICD-10-CM

## 2022-11-15 PROCEDURE — 96401 CHEMO ANTI-NEOPL SQ/IM: CPT | Performed by: NURSE PRACTITIONER

## 2022-12-05 ENCOUNTER — TELEPHONE (OUTPATIENT)
Dept: PRIMARY CARE CLINIC | Age: 82
End: 2022-12-05

## 2022-12-05 ENCOUNTER — NURSE TRIAGE (OUTPATIENT)
Dept: OTHER | Facility: CLINIC | Age: 82
End: 2022-12-05

## 2022-12-05 NOTE — TELEPHONE ENCOUNTER
[facility]    Received call from Wang Tolentino at [facility] with Red Flag Complaint. Subjective: Caller states \"Since thanksgiving, I dont know if I over done it cooking but after my son left I just had to lay down but for 3 days it was hard to get out of bed, I was so weak and tired. But now able to get up a little bit more but the right side of my face is numb\"     Current Symptoms: right sided facial numbness x 12 days. Intermittent dizziness and headaches. Fatigue. Hx of facial numbness in the past    Onset: 12 days ago; improving    Associated Symptoms: NA    Pain Severity: 0/10; Temperature: No     What has been tried: NA    LMP: NA Pregnant: NA    Recommended disposition: Go to [facility]    Care advice provided, patient verbalizes understanding; denies any other questions or concerns; instructed to call back for any new or worsening symptoms. Patient/Caller agrees with recommended disposition; writer provided warm transfer to [facility] at [facility] for appointment scheduling    Attention Provider: Thank you for allowing me to participate in the care of your patient. The patient was connected to triage in response to information provided to the [facility]. Please do not respond through this encounter as the response is not directed to a shared pool.     Reason for Disposition   Neurologic deficit of gradual onset (e.g., days to weeks), ANY of the following: * Weakness of the face, arm, or leg on one side of the body* Numbness of the face, arm, or leg on one side of the body* Loss of speech or garbled speech    Protocols used: Neurologic Deficit-ADULT-[place]

## 2022-12-05 NOTE — TELEPHONE ENCOUNTER
Pt called requesting to be seen-she started feeling really tired Thanksgiving night and just figured she had over done it cooking but she stayed in the bed for the next 3 days and she is having numbness in the right side of her face. She stated she has had it before. She stated that she is feeling a little better each day but she would like to get checked out asap.

## 2022-12-06 ENCOUNTER — OFFICE VISIT (OUTPATIENT)
Dept: PRIMARY CARE CLINIC | Age: 82
End: 2022-12-06
Payer: MEDICARE

## 2022-12-06 VITALS
TEMPERATURE: 97.7 F | HEART RATE: 84 BPM | DIASTOLIC BLOOD PRESSURE: 69 MMHG | BODY MASS INDEX: 30.22 KG/M2 | SYSTOLIC BLOOD PRESSURE: 139 MMHG | HEIGHT: 66 IN | RESPIRATION RATE: 18 BRPM | WEIGHT: 188 LBS | OXYGEN SATURATION: 98 %

## 2022-12-06 DIAGNOSIS — E78.5 HYPERLIPIDEMIA, UNSPECIFIED HYPERLIPIDEMIA TYPE: ICD-10-CM

## 2022-12-06 DIAGNOSIS — R53.83 FATIGUE, UNSPECIFIED TYPE: ICD-10-CM

## 2022-12-06 DIAGNOSIS — I10 ESSENTIAL HYPERTENSION: ICD-10-CM

## 2022-12-06 DIAGNOSIS — Z86.73 HISTORY OF CVA (CEREBROVASCULAR ACCIDENT): ICD-10-CM

## 2022-12-06 DIAGNOSIS — R20.2 FACIAL PARESTHESIA: Primary | ICD-10-CM

## 2022-12-06 DIAGNOSIS — I34.0 NONRHEUMATIC MITRAL VALVE REGURGITATION: ICD-10-CM

## 2022-12-06 DIAGNOSIS — I25.10 CORONARY ARTERY DISEASE INVOLVING NATIVE HEART WITHOUT ANGINA PECTORIS, UNSPECIFIED VESSEL OR LESION TYPE: ICD-10-CM

## 2022-12-06 DIAGNOSIS — R53.1 WEAKNESS GENERALIZED: ICD-10-CM

## 2022-12-06 PROCEDURE — G8427 DOCREV CUR MEDS BY ELIG CLIN: HCPCS | Performed by: NURSE PRACTITIONER

## 2022-12-06 PROCEDURE — G8417 CALC BMI ABV UP PARAM F/U: HCPCS | Performed by: NURSE PRACTITIONER

## 2022-12-06 PROCEDURE — G8754 DIAS BP LESS 90: HCPCS | Performed by: NURSE PRACTITIONER

## 2022-12-06 PROCEDURE — 3075F SYST BP GE 130 - 139MM HG: CPT | Performed by: NURSE PRACTITIONER

## 2022-12-06 PROCEDURE — 3078F DIAST BP <80 MM HG: CPT | Performed by: NURSE PRACTITIONER

## 2022-12-06 PROCEDURE — 1090F PRES/ABSN URINE INCON ASSESS: CPT | Performed by: NURSE PRACTITIONER

## 2022-12-06 PROCEDURE — G8432 DEP SCR NOT DOC, RNG: HCPCS | Performed by: NURSE PRACTITIONER

## 2022-12-06 PROCEDURE — G8536 NO DOC ELDER MAL SCRN: HCPCS | Performed by: NURSE PRACTITIONER

## 2022-12-06 PROCEDURE — 99214 OFFICE O/P EST MOD 30 MIN: CPT | Performed by: NURSE PRACTITIONER

## 2022-12-06 PROCEDURE — G8752 SYS BP LESS 140: HCPCS | Performed by: NURSE PRACTITIONER

## 2022-12-06 PROCEDURE — 1123F ACP DISCUSS/DSCN MKR DOCD: CPT | Performed by: NURSE PRACTITIONER

## 2022-12-06 PROCEDURE — 1101F PT FALLS ASSESS-DOCD LE1/YR: CPT | Performed by: NURSE PRACTITIONER

## 2022-12-06 NOTE — PROGRESS NOTES
Chief Complaint   Patient presents with    Numbness     Right side of face started around thanksgiving, pt stated it has gotten better since then. And ankles having some numbness as well    1. Have you been to the ER, urgent care clinic since your last visit? Hospitalized since your last visit? No    2. Have you seen or consulted any other health care providers outside of the [address] [place] [address] since your last visit? Include any pap smears or colon screening.  No

## 2022-12-06 NOTE — PROGRESS NOTES
Blu Thakur is a 80 y.o. female who presents to the office today for the following:    Chief Complaint   Patient presents with    Numbness       Past Medical History:   Diagnosis Date    Anemia 6/24/2020    Anxiety 6/24/2020    Arthritis 6/24/2020    Breast cancer ([facility] [address] 6/24/2020    GERD (gastroesophageal reflux disease) 6/24/2020    Heart disease 6/24/2020    Hyperlipidemia 6/24/2020    Hypertension 6/24/2020    Sleep disorder 6/24/2020    Stroke (cerebrum) ([facility] [address] 6/24/2020 2012       Past Surgical History:   Procedure Laterality Date    HX BREAST LUMPECTOMY  1996    HX TUBAL LIGATION      SD REVISION AMBROSE-IMPLANT CAPSULE BREAST          Family History   Problem Relation Age of Onset    Hypertension Mother     Hypertension Sister     Alzheimer's Disease Sister     Cancer Brother         Social History     Tobacco Use    Smoking status: Former    Smokeless tobacco: Never   Substance Use Topics    Alcohol use: Not Currently    Drug use: Never        HPI  Patient with PMH of hyperlipidemia, GERD, hypertension, cva, anxiety, CAD, MV regurgitation, osteoporosis,vitamin d deficiency , copd, recurrent falls,breast cancer and L1 fracture who has concerns regarding numbness to right face and sudden generalized weakness that occurred on 11/24/22. States that she had just finished eating with family after thanksgiving and symptoms started at bedtime. Went to sleep and woke up feeling the same. Reports she did not get out of bed much for about 3 days straight. Denied having any fever, vomiting, diarrhea or cough. Also no significant headache, confusion, unilateral weakness or trouble speaking. Does have history of CVA in the past but states that she did have another episode unrelated to stroke where she experienced numbness to face. Has been dealing with persistent fatigue as well as some sob but has declined going to pulmonology for further work up due transportation concerns.  States \"my son would take me but I dont want to ask. \"    Current Outpatient Medications on File Prior to Visit   Medication Sig    mirtazapine (REMERON) 7.5 mg tablet Take 7.5 mg by mouth nightly. metoprolol succinate (TOPROL-XL) 25 mg XL tablet TAKE 1 TABLET BY MOUTH TWO TIMES A DAY. atorvastatin (LIPITOR) 20 mg tablet Take 1 Tablet by mouth daily. ALPRAZolam (XANAX) 1 mg tablet TAKE 1 TAB BY MOUTH 2 TIMES DAILY AS NEEDED FOR ANXIETY FOR UP TO 30 DAYS. MAX DAILY AMOUNT 2 MG    albuterol (PROVENTIL HFA, VENTOLIN HFA, PROAIR HFA) 90 mcg/actuation inhaler Take 2 Puffs by inhalation every six (6) hours as needed for Wheezing. denosumab (PROLIA) 60 mg/mL injection 1 mL by SubCUTAneous route every 6 months. omeprazole (PRILOSEC) 40 mg capsule Take 1 Capsule by mouth daily. melatonin 3 mg tablet Take 9 mg by mouth nightly as needed for Insomnia. cholecalciferol (Vitamin D3) 25 mcg (1,000 unit) cap Take 1,000 Units by mouth daily. (Patient not taking: Reported on 3/30/2022)    aspirin delayed-release 81 mg tablet Take  by mouth daily. No current facility-administered medications on file prior to visit. No orders of the defined types were placed in this encounter. Review of Systems   Constitutional:  Positive for malaise/fatigue. Negative for chills, fever and weight loss. HENT: Negative. Eyes: Negative. Respiratory:  Positive for shortness of breath. Negative for cough, hemoptysis, sputum production and wheezing. Cardiovascular: Negative. Gastrointestinal: Negative. Genitourinary: Negative. Musculoskeletal:  Positive for back pain and myalgias. Skin: Negative. Neurological:  Positive for tingling and weakness. Negative for dizziness, sensory change, speech change, focal weakness, seizures, loss of consciousness and headaches. Psychiatric/Behavioral:  Negative for depression, memory loss, substance abuse and suicidal ideas. The patient is nervous/anxious and has insomnia.          Visit Vitals  BP 139/69 (BP 1 Location: Left upper arm, BP Patient Position: Sitting, BP Cuff Size: Adult)   Pulse 84   Temp 97.7 °F (36.5 °C) (Temporal)   Resp 18   Ht 5' 6\" (1.676 m)   Wt 188 lb (85.3 kg)   SpO2 98%   BMI 30.34 kg/m²       Physical Exam  Vitals and nursing note reviewed. Constitutional:       Appearance: Normal appearance. HENT:      Head: Normocephalic and atraumatic. Right Ear: Tympanic membrane normal.      Left Ear: Tympanic membrane normal.      Nose: Nose normal.      Mouth/Throat:      Mouth: Mucous membranes are moist.      Pharynx: Oropharynx is clear. Eyes:      General: No visual field deficit. Pupils: Pupils are equal, round, and reactive to light. Neck:      Vascular: No carotid bruit. Cardiovascular:      Rate and Rhythm: Normal rate and regular rhythm. Pulses: Normal pulses. Heart sounds: Murmur heard. Pulmonary:      Effort: Pulmonary effort is normal.   Abdominal:      General: Bowel sounds are normal.      Palpations: Abdomen is soft. Tenderness: There is no abdominal tenderness. There is no guarding. Musculoskeletal:      Cervical back: Normal.      Thoracic back: Normal.        Legs:    Skin:     General: Skin is warm and dry. Neurological:      Mental Status: She is alert and oriented to person, place, and time. Cranial Nerves: Cranial nerves 2-12 are intact. No cranial nerve deficit, dysarthria or facial asymmetry. Sensory: Sensation is intact. Motor: Motor function is intact. Coordination: Coordination is intact. Gait: Gait abnormal.      Deep Tendon Reflexes: Abnormal reflex: unable to assess. Comments: Uses cane   Psychiatric:         Mood and Affect: Mood normal.         Behavior: Behavior normal.          1. Facial paresthesia  Its seems she has had similar incident in past which was unrelated to prior CVA. No cause was determined on testing. No areas of focal weakness or other neurologic change.  Sx are now improving. Will check MRI brain to evaluate further given history of CVA  Dicussed other potential causes of symptoms  - MRI BRAIN WO CONT; Future    2. History of CVA (cerebrovascular accident)  See above  On ASA 81mg daily  - MRI BRAIN WO CONT; Future  - DUPLEX CAROTID BILATERAL; Future    3. Weakness generalized  Reports this has improved over the past 12 days    4. Essential hypertension     BP Readings from Last 3 Encounters:   12/06/22 139/69   10/12/22 (!) 134/56   09/13/22 139/75   Blood pressure remains controlled and continues metoprolol as directed  Monitor readings at home and notify provider if > 140/90    5. Hyperlipidemia, unspecified hyperlipidemia type  Lab Results   Component Value Date/Time    LDL, calculated 84 09/13/2022 09:30 AM   Stable and continues atorvastatin as directed    6. Fatigue, unspecified type  Ongoing concern and has been recommended for sleep apnea eval  Declines seeing pulmonology but have discussed consideration for doing sleep study locally. She will consider and notify provider if agrees to referral locally    7. Coronary artery disease involving native heart without angina pectoris, unspecified vessel or lesion type   Normal stress test 2/4/2020 and recent cardiology follow up in 8/2022   Denies any chest pain but does still have sob with exertion thought to be due to underlying pulmonary cause. Clear CXR 9/2022  Continues atorvastatin, asa, metoprolol as directed    8. Nonrheumatic mitral valve regurgitation  07/28/21    ECHO ADULT COMPLETE 07/28/2021 7/28/2021    Interpretation Summary  · LV: Estimated LVEF is 60 - 65%. Normal cavity size, wall thickness and systolic function (ejection fraction normal). Wall motion: normal. Age-appropriate left ventricular diastolic function. · LA: Mildly dilated left atrium. Left Atrium volume index is 31 mL/m2. · AV: Mild aortic valve leaflet calcification present without reduced excursion.   · MV: Mild mitral valve regurgitation is present. · TV: Right Ventricular Arterial Pressure (RVSP) is 36 mmHg. Signed by: Cony Awan MD on 7/28/2021  3:21 PM          Patient verbalizes understanding of plan of care as discussed above and informed if develops worsening symptoms such as chest pain  to follow up immediately with provider or seek emergency care if needed. Follow-up and Dispositions    Return pending test results or worsening symptoms.

## 2022-12-08 LAB
ALBUMIN SERPL-MCNC: 4.3 G/DL (ref 3.6–4.6)
ALBUMIN/GLOB SERPL: 2.4 {RATIO} (ref 1.2–2.2)
ALP SERPL-CCNC: 80 IU/L (ref 44–121)
ALT SERPL-CCNC: 10 IU/L (ref 0–32)
APPEARANCE UR: ABNORMAL
AST SERPL-CCNC: 17 IU/L (ref 0–40)
BACTERIA #/AREA URNS HPF: ABNORMAL /[HPF]
BASOPHILS # BLD AUTO: 0 X10E3/UL (ref 0–0.2)
BASOPHILS NFR BLD AUTO: 1 %
BILIRUB SERPL-MCNC: 0.4 MG/DL (ref 0–1.2)
BILIRUB UR QL STRIP: NEGATIVE
BUN SERPL-MCNC: 16 MG/DL (ref 8–27)
BUN/CREAT SERPL: 19 (ref 12–28)
CALCIUM SERPL-MCNC: 8.8 MG/DL (ref 8.7–10.3)
CASTS URNS QL MICRO: ABNORMAL /LPF
CHLORIDE SERPL-SCNC: 104 MMOL/L (ref 96–106)
CHOLEST SERPL-MCNC: 160 MG/DL (ref 100–199)
CO2 SERPL-SCNC: 26 MMOL/L (ref 20–29)
COLOR UR: YELLOW
CREAT SERPL-MCNC: 0.84 MG/DL (ref 0.57–1)
EGFR: 69 ML/MIN/1.73
EOSINOPHIL # BLD AUTO: 0.2 X10E3/UL (ref 0–0.4)
EOSINOPHIL NFR BLD AUTO: 3 %
EPI CELLS #/AREA URNS HPF: >10 /HPF (ref 0–10)
ERYTHROCYTE [DISTWIDTH] IN BLOOD BY AUTOMATED COUNT: 13.2 % (ref 11.7–15.4)
GLOBULIN SER CALC-MCNC: 1.8 G/DL (ref 1.5–4.5)
GLUCOSE SERPL-MCNC: 97 MG/DL (ref 70–99)
GLUCOSE UR QL STRIP: NEGATIVE
HCT VFR BLD AUTO: 39 % (ref 34–46.6)
HDLC SERPL-MCNC: 58 MG/DL
HGB BLD-MCNC: 12.4 G/DL (ref 11.1–15.9)
HGB UR QL STRIP: NEGATIVE
IMM GRANULOCYTES # BLD AUTO: 0 X10E3/UL (ref 0–0.1)
IMM GRANULOCYTES NFR BLD AUTO: 0 %
KETONES UR QL STRIP: NEGATIVE
LDLC SERPL CALC-MCNC: 80 MG/DL (ref 0–99)
LEUKOCYTE ESTERASE UR QL STRIP: ABNORMAL
LYMPHOCYTES # BLD AUTO: 2.6 X10E3/UL (ref 0.7–3.1)
LYMPHOCYTES NFR BLD AUTO: 43 %
MCH RBC QN AUTO: 29.2 PG (ref 26.6–33)
MCHC RBC AUTO-ENTMCNC: 31.8 G/DL (ref 31.5–35.7)
MCV RBC AUTO: 92 FL (ref 79–97)
MICRO URNS: ABNORMAL
MONOCYTES # BLD AUTO: 0.5 X10E3/UL (ref 0.1–0.9)
MONOCYTES NFR BLD AUTO: 8 %
NEUTROPHILS # BLD AUTO: 2.8 X10E3/UL (ref 1.4–7)
NEUTROPHILS NFR BLD AUTO: 45 %
NITRITE UR QL STRIP: NEGATIVE
PH UR STRIP: 7 [PH] (ref 5–7.5)
PLATELET # BLD AUTO: 163 X10E3/UL (ref 150–450)
POTASSIUM SERPL-SCNC: 3.9 MMOL/L (ref 3.5–5.2)
PROT SERPL-MCNC: 6.1 G/DL (ref 6–8.5)
PROT UR QL STRIP: NEGATIVE
RBC # BLD AUTO: 4.24 X10E6/UL (ref 3.77–5.28)
RBC #/AREA URNS HPF: ABNORMAL /HPF (ref 0–2)
SODIUM SERPL-SCNC: 141 MMOL/L (ref 134–144)
SP GR UR STRIP: 1.01 (ref 1–1.03)
SPECIMEN STATUS REPORT, ROLRST: NORMAL
TRIGL SERPL-MCNC: 125 MG/DL (ref 0–149)
TSH SERPL DL<=0.005 MIU/L-ACNC: 2.18 UIU/ML (ref 0.45–4.5)
UROBILINOGEN UR STRIP-MCNC: 0.2 MG/DL (ref 0.2–1)
VLDLC SERPL CALC-MCNC: 22 MG/DL (ref 5–40)
WBC # BLD AUTO: 6 X10E3/UL (ref 3.4–10.8)
WBC #/AREA URNS HPF: ABNORMAL /HPF (ref 0–5)

## 2022-12-13 NOTE — PROGRESS NOTES
Please let patient know that labwork looks great. If any questions let me know. Callie Balderas if you will schedule 3 mo follow up from last appointment.

## 2022-12-19 ENCOUNTER — CLINICAL SUPPORT (OUTPATIENT)
Dept: PRIMARY CARE CLINIC | Age: 82
End: 2022-12-19
Payer: MEDICARE

## 2022-12-19 DIAGNOSIS — Z23 NEEDS FLU SHOT: Primary | ICD-10-CM

## 2022-12-20 PROCEDURE — G0008 ADMIN INFLUENZA VIRUS VAC: HCPCS

## 2022-12-20 PROCEDURE — 90694 VACC AIIV4 NO PRSRV 0.5ML IM: CPT

## 2022-12-27 ENCOUNTER — HOSPITAL ENCOUNTER (OUTPATIENT)
Dept: MRI IMAGING | Age: 82
Discharge: HOME | End: 2022-12-27
Attending: NURSE PRACTITIONER
Payer: MEDICARE

## 2022-12-27 ENCOUNTER — HOSPITAL ENCOUNTER (OUTPATIENT)
Dept: VASCULAR SURGERY | Age: 82
Discharge: HOME | End: 2022-12-27
Attending: NURSE PRACTITIONER
Payer: MEDICARE

## 2022-12-27 DIAGNOSIS — Z86.73 HISTORY OF CVA (CEREBROVASCULAR ACCIDENT): ICD-10-CM

## 2022-12-27 DIAGNOSIS — R20.2 FACIAL PARESTHESIA: ICD-10-CM

## 2022-12-27 LAB
LEFT CCA DIST DIAS: 19 CM/S
LEFT CCA DIST SYS: 92 CM/S
LEFT CCA PROX DIAS: 11.8 CM/S
LEFT CCA PROX SYS: 68.5 CM/S
LEFT ECA DIAS: 6.92 CM/S
LEFT ECA SYS: 69.7 CM/S
LEFT ICA DIST DIAS: 20.7 CM/S
LEFT ICA DIST SYS: 68.2 CM/S
LEFT ICA MID DIAS: 19.1 CM/S
LEFT ICA MID SYS: 68.5 CM/S
LEFT ICA PROX DIAS: 14.8 CM/S
LEFT ICA PROX SYS: 62.5 CM/S
LEFT ICA/CCA SYS: 0.74 NO UNITS
LEFT VERTEBRAL DIAS: 10.61 CM/S
LEFT VERTEBRAL SYS: 63 CM/S
RIGHT CCA DIST DIAS: 14.9 CM/S
RIGHT CCA DIST SYS: 76.6 CM/S
RIGHT CCA PROX DIAS: 17.5 CM/S
RIGHT CCA PROX SYS: 90 CM/S
RIGHT ECA DIAS: 10.64 CM/S
RIGHT ECA SYS: 74.5 CM/S
RIGHT ICA DIST DIAS: 20.8 CM/S
RIGHT ICA DIST SYS: 78.7 CM/S
RIGHT ICA MID DIAS: 16 CM/S
RIGHT ICA MID SYS: 63.6 CM/S
RIGHT ICA PROX DIAS: 12.5 CM/S
RIGHT ICA PROX SYS: 54.1 CM/S
RIGHT ICA/CCA SYS: 1 NO UNITS
RIGHT VERTEBRAL DIAS: 7.96 CM/S
RIGHT VERTEBRAL SYS: 53.3 CM/S
VAS LEFT SUBCLAVIAN PROX PSV: 95.9 CM/S
VAS RIGHT SUBCLAVIAN PROX PSV: 104.4 CM/S

## 2022-12-27 PROCEDURE — 70551 MRI BRAIN STEM W/O DYE: CPT

## 2022-12-27 PROCEDURE — 93880 EXTRACRANIAL BILAT STUDY: CPT

## 2022-12-28 DIAGNOSIS — K21.9 GASTROESOPHAGEAL REFLUX DISEASE WITHOUT ESOPHAGITIS: ICD-10-CM

## 2022-12-28 RX ORDER — OMEPRAZOLE 40 MG/1
CAPSULE, DELAYED RELEASE ORAL
Qty: 90 CAPSULE | Refills: 1 | Status: SHIPPED | OUTPATIENT
Start: 2022-12-28
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
## 2023-01-05 NOTE — PROGRESS NOTES
Mild chronic microvascular ischemic change and mild cerebral atrophy. No acute intracranial process is demonstrated per  MRI results/SUZANNE Saavedra NP. Patient stated understanding and had no questions or concerns.

## 2023-01-05 NOTE — PROGRESS NOTES
IMPRESSION  Mild chronic microvascular ischemic change and mild cerebral atrophy. No acute intracranial process is demonstrated.

## 2023-03-10 DIAGNOSIS — E78.5 HYPERLIPIDEMIA, UNSPECIFIED HYPERLIPIDEMIA TYPE: ICD-10-CM

## 2023-03-10 RX ORDER — ATORVASTATIN CALCIUM 20 MG/1
TABLET, FILM COATED ORAL
Qty: 90 TABLET | Refills: 1 | Status: SHIPPED | OUTPATIENT
Start: 2023-03-10

## 2023-03-13 ENCOUNTER — HOSPITAL ENCOUNTER (OUTPATIENT)
Dept: GENERAL RADIOLOGY | Age: 83
Discharge: HOME | End: 2023-03-13
Payer: MEDICARE

## 2023-03-13 ENCOUNTER — OFFICE VISIT (OUTPATIENT)
Dept: PRIMARY CARE CLINIC | Age: 83
End: 2023-03-13
Payer: MEDICARE

## 2023-03-13 VITALS
WEIGHT: 190 LBS | DIASTOLIC BLOOD PRESSURE: 70 MMHG | TEMPERATURE: 98.6 F | RESPIRATION RATE: 20 BRPM | OXYGEN SATURATION: 98 % | SYSTOLIC BLOOD PRESSURE: 132 MMHG | BODY MASS INDEX: 30.53 KG/M2 | HEIGHT: 66 IN | HEART RATE: 76 BPM

## 2023-03-13 DIAGNOSIS — G89.29 CHRONIC MIDLINE LOW BACK PAIN, UNSPECIFIED WHETHER SCIATICA PRESENT: ICD-10-CM

## 2023-03-13 DIAGNOSIS — M25.551 RIGHT HIP PAIN: ICD-10-CM

## 2023-03-13 DIAGNOSIS — Z86.73 HISTORY OF CVA (CEREBROVASCULAR ACCIDENT): ICD-10-CM

## 2023-03-13 DIAGNOSIS — Z85.3 HISTORY OF BREAST CANCER: ICD-10-CM

## 2023-03-13 DIAGNOSIS — M25.532 LEFT WRIST PAIN: ICD-10-CM

## 2023-03-13 DIAGNOSIS — E53.8 VITAMIN B12 DEFICIENCY: ICD-10-CM

## 2023-03-13 DIAGNOSIS — I34.0 NONRHEUMATIC MITRAL VALVE REGURGITATION: ICD-10-CM

## 2023-03-13 DIAGNOSIS — E55.9 VITAMIN D DEFICIENCY: ICD-10-CM

## 2023-03-13 DIAGNOSIS — R53.83 FATIGUE, UNSPECIFIED TYPE: ICD-10-CM

## 2023-03-13 DIAGNOSIS — M81.0 AGE-RELATED OSTEOPOROSIS WITHOUT CURRENT PATHOLOGICAL FRACTURE: ICD-10-CM

## 2023-03-13 DIAGNOSIS — J44.9 CHRONIC OBSTRUCTIVE PULMONARY DISEASE, UNSPECIFIED COPD TYPE (HCC): ICD-10-CM

## 2023-03-13 DIAGNOSIS — M54.50 CHRONIC MIDLINE LOW BACK PAIN, UNSPECIFIED WHETHER SCIATICA PRESENT: ICD-10-CM

## 2023-03-13 DIAGNOSIS — F41.1 GENERALIZED ANXIETY DISORDER: ICD-10-CM

## 2023-03-13 DIAGNOSIS — I10 ESSENTIAL HYPERTENSION: ICD-10-CM

## 2023-03-13 DIAGNOSIS — E78.5 HYPERLIPIDEMIA, UNSPECIFIED HYPERLIPIDEMIA TYPE: Primary | ICD-10-CM

## 2023-03-13 DIAGNOSIS — I25.10 CORONARY ARTERY DISEASE INVOLVING NATIVE HEART WITHOUT ANGINA PECTORIS, UNSPECIFIED VESSEL OR LESION TYPE: ICD-10-CM

## 2023-03-13 DIAGNOSIS — K21.9 GASTROESOPHAGEAL REFLUX DISEASE WITHOUT ESOPHAGITIS: ICD-10-CM

## 2023-03-13 DIAGNOSIS — F41.9 ANXIETY: ICD-10-CM

## 2023-03-13 LAB
BILIRUB UR QL STRIP: NEGATIVE
GLUCOSE UR-MCNC: NEGATIVE MG/DL
KETONES P FAST UR STRIP-MCNC: NEGATIVE MG/DL
PH UR STRIP: 6.5 [PH] (ref 4.6–8)
PROT UR QL STRIP: NEGATIVE
SP GR UR STRIP: 1.02 (ref 1–1.03)
UA UROBILINOGEN AMB POC: NORMAL (ref 0.2–1)
URINALYSIS CLARITY POC: NORMAL
URINALYSIS COLOR POC: NORMAL
URINE BLOOD POC: NEGATIVE
URINE LEUKOCYTES POC: NORMAL
URINE NITRITES POC: NEGATIVE

## 2023-03-13 PROCEDURE — G8417 CALC BMI ABV UP PARAM F/U: HCPCS | Performed by: NURSE PRACTITIONER

## 2023-03-13 PROCEDURE — 1123F ACP DISCUSS/DSCN MKR DOCD: CPT | Performed by: NURSE PRACTITIONER

## 2023-03-13 PROCEDURE — 73502 X-RAY EXAM HIP UNI 2-3 VIEWS: CPT

## 2023-03-13 PROCEDURE — G8427 DOCREV CUR MEDS BY ELIG CLIN: HCPCS | Performed by: NURSE PRACTITIONER

## 2023-03-13 PROCEDURE — 73100 X-RAY EXAM OF WRIST: CPT

## 2023-03-13 PROCEDURE — 99214 OFFICE O/P EST MOD 30 MIN: CPT | Performed by: NURSE PRACTITIONER

## 2023-03-13 PROCEDURE — 3075F SYST BP GE 130 - 139MM HG: CPT | Performed by: NURSE PRACTITIONER

## 2023-03-13 PROCEDURE — G8536 NO DOC ELDER MAL SCRN: HCPCS | Performed by: NURSE PRACTITIONER

## 2023-03-13 PROCEDURE — 1090F PRES/ABSN URINE INCON ASSESS: CPT | Performed by: NURSE PRACTITIONER

## 2023-03-13 PROCEDURE — 1101F PT FALLS ASSESS-DOCD LE1/YR: CPT | Performed by: NURSE PRACTITIONER

## 2023-03-13 PROCEDURE — G8432 DEP SCR NOT DOC, RNG: HCPCS | Performed by: NURSE PRACTITIONER

## 2023-03-13 PROCEDURE — 3078F DIAST BP <80 MM HG: CPT | Performed by: NURSE PRACTITIONER

## 2023-03-13 PROCEDURE — 81003 URINALYSIS AUTO W/O SCOPE: CPT | Performed by: NURSE PRACTITIONER

## 2023-03-13 RX ORDER — ACETAMINOPHEN 500 MG
2000 TABLET ORAL DAILY
Qty: 90 CAPSULE | Refills: 1 | Status: SHIPPED | OUTPATIENT
Start: 2023-03-13

## 2023-03-13 RX ORDER — ALPRAZOLAM 1 MG/1
TABLET ORAL
Qty: 60 TABLET | Refills: 0 | Status: SHIPPED | OUTPATIENT
Start: 2023-03-13

## 2023-03-13 NOTE — PROGRESS NOTES
Chief Complaint   Patient presents with    Hypertension     Follow up     1. Have you been to the ER, urgent care clinic since your last visit? Hospitalized since your last visit? No    2. Have you seen or consulted any other health care providers outside of the [address] [place] [address] since your last visit? Include any pap smears or colon screening.  No

## 2023-03-16 ENCOUNTER — HOSPITAL ENCOUNTER (OUTPATIENT)
Dept: MAMMOGRAPHY | Age: 83
Discharge: HOME | End: 2023-03-16
Attending: NURSE PRACTITIONER
Payer: MEDICARE

## 2023-03-16 DIAGNOSIS — Z12.31 SCREENING MAMMOGRAM, ENCOUNTER FOR: ICD-10-CM

## 2023-03-16 PROCEDURE — 77063 BREAST TOMOSYNTHESIS BI: CPT

## 2023-03-16 NOTE — PROGRESS NOTES
IMPRESSION  BI-RADS 2: Benign. No mammographic evidence of malignancy. No significant change  in right breast lumpectomy scar.     RECOMMENDATIONS:  Next screening mammogram is recommended in one year.    Notify provider if any changes in breast noted sooner on self exams

## 2023-03-16 NOTE — PROGRESS NOTES
Please let patient know that xray of right hip does show moderate right hip osteoarthritis. Going to refer to orthopedic to evaluate and treat. Notify provider if any change or worsening symptoms sooner.

## 2023-03-17 NOTE — PROGRESS NOTES
Informed patient of x ray results and recommendations per A. Ellyn Kanner, NP. Patient stated she did not want to see anyone at this time. She will continue to do what she is doing because of transportation reasons.

## 2023-03-17 NOTE — PROGRESS NOTES
Patient informed of negative mammogram, need for repeat in 1 year and to notify PCP of any changes in breast.

## 2023-03-25 DIAGNOSIS — I10 ESSENTIAL HYPERTENSION: ICD-10-CM

## 2023-03-25 RX ORDER — METOPROLOL SUCCINATE 25 MG/1
TABLET, EXTENDED RELEASE ORAL
Qty: 180 TABLET | Refills: 1 | Status: SHIPPED | OUTPATIENT
Start: 2023-03-25

## 2023-04-25 ENCOUNTER — OFFICE VISIT (OUTPATIENT)
Dept: PRIMARY CARE CLINIC | Age: 83
End: 2023-04-25
Payer: MEDICARE

## 2023-04-25 ENCOUNTER — TELEPHONE (OUTPATIENT)
Dept: PRIMARY CARE CLINIC | Age: 83
End: 2023-04-25

## 2023-04-25 VITALS
DIASTOLIC BLOOD PRESSURE: 72 MMHG | SYSTOLIC BLOOD PRESSURE: 139 MMHG | OXYGEN SATURATION: 98 % | HEART RATE: 78 BPM | BODY MASS INDEX: 31.05 KG/M2 | RESPIRATION RATE: 18 BRPM | HEIGHT: 66 IN | TEMPERATURE: 97.5 F | WEIGHT: 193.2 LBS

## 2023-04-25 DIAGNOSIS — M25.551 RIGHT HIP PAIN: Primary | ICD-10-CM

## 2023-04-25 DIAGNOSIS — F41.9 ANXIETY: ICD-10-CM

## 2023-04-25 DIAGNOSIS — H60.541 DERMATITIS OF EAR CANAL, RIGHT: ICD-10-CM

## 2023-04-25 PROCEDURE — 1101F PT FALLS ASSESS-DOCD LE1/YR: CPT | Performed by: NURSE PRACTITIONER

## 2023-04-25 PROCEDURE — 3078F DIAST BP <80 MM HG: CPT | Performed by: NURSE PRACTITIONER

## 2023-04-25 PROCEDURE — 1090F PRES/ABSN URINE INCON ASSESS: CPT | Performed by: NURSE PRACTITIONER

## 2023-04-25 PROCEDURE — G8536 NO DOC ELDER MAL SCRN: HCPCS | Performed by: NURSE PRACTITIONER

## 2023-04-25 PROCEDURE — 99214 OFFICE O/P EST MOD 30 MIN: CPT | Performed by: NURSE PRACTITIONER

## 2023-04-25 PROCEDURE — G8417 CALC BMI ABV UP PARAM F/U: HCPCS | Performed by: NURSE PRACTITIONER

## 2023-04-25 PROCEDURE — G8427 DOCREV CUR MEDS BY ELIG CLIN: HCPCS | Performed by: NURSE PRACTITIONER

## 2023-04-25 PROCEDURE — 3075F SYST BP GE 130 - 139MM HG: CPT | Performed by: NURSE PRACTITIONER

## 2023-04-25 PROCEDURE — G8432 DEP SCR NOT DOC, RNG: HCPCS | Performed by: NURSE PRACTITIONER

## 2023-04-25 PROCEDURE — 1123F ACP DISCUSS/DSCN MKR DOCD: CPT | Performed by: NURSE PRACTITIONER

## 2023-04-25 RX ORDER — FLUOCINOLONE ACETONIDE 0.11 MG/ML
OIL TOPICAL
Status: CANCELLED | OUTPATIENT
Start: 2023-04-25

## 2023-04-25 RX ORDER — ALPRAZOLAM 1 MG/1
TABLET ORAL
Qty: 60 TABLET | Refills: 2 | Status: SHIPPED | OUTPATIENT
Start: 2023-04-25

## 2023-04-25 RX ORDER — FLUOCINOLONE ACETONIDE 0.11 MG/ML
5 OIL AURICULAR (OTIC) 2 TIMES DAILY
Qty: 20 ML | Refills: 0 | Status: SHIPPED | OUTPATIENT
Start: 2023-04-25 | End: 2023-05-02

## 2023-04-25 NOTE — PROGRESS NOTES
Sherman Spangler is a 80 y.o. female who presents to the office today for the following:    Chief Complaint   Patient presents with    Hypertension       Past Medical History:   Diagnosis Date    Anemia 6/24/2020    Anxiety 6/24/2020    Arthritis 6/24/2020    Breast cancer ([facility] [address] 6/24/2020    GERD (gastroesophageal reflux disease) 6/24/2020    Heart disease 6/24/2020    Hyperlipidemia 6/24/2020    Hypertension 6/24/2020    Sleep disorder 6/24/2020    Stroke (cerebrum) ([facility] [address] 6/24/2020 2012       Past Surgical History:   Procedure Laterality Date    HX BREAST LUMPECTOMY  1996    HX TUBAL LIGATION      AL REVISION AMBROSE-IMPLANT CAPSULE BREAST          Family History   Problem Relation Age of Onset    Hypertension Mother     Hypertension Sister     Alzheimer's Disease Sister     Cancer Brother         Social History     Tobacco Use    Smoking status: Former    Smokeless tobacco: Never   Substance Use Topics    Alcohol use: Not Currently    Drug use: Never        HPI  Patient here today for follow up of right hip pain with PMH of hyperlipidemia, GERD, hypertension, cva, anxiety, CAD, MV regurgitation, osteoporosis,vitamin d deficiency , copd, recurrent falls,breast cancer and L1 fracture. Reports taking medicine as directed. Still having pain in right hip but reports pain in wrist resolved. Hurts primarily if turns wrong or walking more. No pain in back, numbness or weakness in extremity. Also has been having itching to right ear canal. Is scratching using a raulito pin as this is only thing that helps. No pain or drainage from ear. Current Outpatient Medications on File Prior to Visit   Medication Sig    metoprolol succinate (TOPROL-XL) 25 mg XL tablet TAKE 1 TABLET BY MOUTH TWICE A DAY    cholecalciferol (Vitamin D3) (2,000 UNITS /50 MCG) cap capsule Take 1 Capsule by mouth daily.     atorvastatin (LIPITOR) 20 mg tablet TAKE 1 TABLET BY MOUTH EVERY DAY    omeprazole (PRILOSEC) 40 mg capsule TAKE 1 CAPSULE BY MOUTH EVERY DAY    albuterol (PROVENTIL HFA, VENTOLIN HFA, PROAIR HFA) 90 mcg/actuation inhaler Take 2 Puffs by inhalation every six (6) hours as needed for Wheezing. denosumab (PROLIA) 60 mg/mL injection 1 mL by SubCUTAneous route every 6 months.    melatonin 3 mg tablet Take 3 Tablets by mouth nightly as needed for Insomnia. aspirin delayed-release 81 mg tablet Take  by mouth daily. [DISCONTINUED] ALPRAZolam (XANAX) 1 mg tablet TAKE 1 TAB BY MOUTH 2 TIMES DAILY AS NEEDED FOR ANXIETY FOR UP TO 30 DAYS. MAX DAILY AMOUNT 2 MG     No current facility-administered medications on file prior to visit. Medications Ordered Today   Medications    ALPRAZolam (XANAX) 1 mg tablet     Sig: TAKE 1 TAB BY MOUTH 2 TIMES DAILY AS NEEDED FOR ANXIETY FOR UP TO 30 DAYS. MAX DAILY AMOUNT 2 MG     Dispense:  60 Tablet     Refill:  2    fluocinolone acetonide oiL 0.01 % drop     Si Drops by Otic route two (2) times a day for 7 days. Dispense:  20 mL     Refill:  0        Review of Systems   Constitutional:  Positive for fever. Negative for chills. HENT:  Negative for congestion, ear pain, hearing loss, sinus pain and sore throat. Itching to right ear canal   Eyes: Negative. Respiratory:  Negative for cough, hemoptysis, sputum production and wheezing. Cardiovascular: Negative. Gastrointestinal: Negative. Genitourinary: Negative. Musculoskeletal:  Positive for back pain, falls, joint pain and myalgias. Negative for neck pain. Skin: Negative. Neurological:  Negative for dizziness, tingling, sensory change, speech change, focal weakness, seizures, loss of consciousness and headaches. Psychiatric/Behavioral:  Negative for depression, substance abuse and suicidal ideas. The patient is nervous/anxious. The patient does not have insomnia.          Visit Vitals  /72 (BP 1 Location: Left upper arm, BP Patient Position: Sitting, BP Cuff Size: Large adult)   Pulse 78   Temp 97.5 °F (36.4 °C) (Temporal)   Resp 18   Ht 5' 6\" (1.676 m)   Wt 193 lb 3.2 oz (87.6 kg)   SpO2 98%   BMI 31.18 kg/m²       Physical Exam  Vitals and nursing note reviewed. Constitutional:       Appearance: Normal appearance. HENT:      Right Ear: Tympanic membrane normal.      Left Ear: Tympanic membrane normal.      Ears:      Comments: Erythema and flanking to right ear canal     Mouth/Throat:      Mouth: Mucous membranes are moist.      Pharynx: Oropharynx is clear. Eyes:      Pupils: Pupils are equal, round, and reactive to light. Cardiovascular:      Rate and Rhythm: Normal rate and regular rhythm. Pulses: Normal pulses. Heart sounds: Murmur heard. Pulmonary:      Effort: Pulmonary effort is normal.   Abdominal:      General: Bowel sounds are normal.      Palpations: Abdomen is soft. Tenderness: There is no abdominal tenderness. There is no guarding. Musculoskeletal:      Cervical back: Normal.      Thoracic back: Normal.      Lumbar back: Normal.      Right hip: Tenderness present. No deformity or crepitus. Normal range of motion. Left hip: No deformity, tenderness or crepitus. Normal range of motion. Legs:    Skin:     General: Skin is warm and dry. Neurological:      Mental Status: She is alert. Sensory: Sensation is intact. Motor: Motor function is intact. Coordination: Coordination is intact. Gait: Gait abnormal.      Comments: Uses cane   Psychiatric:         Mood and Affect: Mood normal.         Behavior: Behavior normal.              1. Right hip pain  IMPRESSION 3/13/23  No acute process. Right hip osteoarthritis.      Tenderness generalized to right hip but range of motion intact  Going to check  CT given duration of symptoms and trauma reported from fall  Continue to encourage range of motion exercises as tolerated  May use tylenol prn for pain  Discuss further recommendations pending test results  Likely refer to orthopedic but she is hesitant due to difficulty with transportation and wants to have CT first. Provided resources for transportation options. - CT PELV W CONT; Future    2. Dermatitis of ear canal, right  Treat with topical fluocinolone oil as directed x 5 days  Advised to avoid use of raulito pin, cutip or other foreign object in ear  Notify provider if develops drainage, pain or other worsening symptoms or if not resolving  - fluocinolone acetonide oiL 0.01 % drop; 5 Drops by Otic route two (2) times a day for 7 days. Dispense: 20 mL; Refill: 0    3. Anxiety  She uses xanax prn for episodes of acute anxiety and will refill today  Advised do not drive or operate machinery if experiencing drowsiness or otherwise feel impaired while taking medication. ORT score 2 and low risk   Controlled substance agreement discussed and signed   Understands proper use, storage and disposal of medication   Controlled Substance Monitoring:    RX Monitoring 4/25/2023   Periodic Controlled Substance Monitoring Possible medication side effects, risk of tolerance/dependence & alternative treatments discussed. ;No signs of potential drug abuse or diversion identified.       - ALPRAZolam (XANAX) 1 mg tablet; TAKE 1 TAB BY MOUTH 2 TIMES DAILY AS NEEDED FOR ANXIETY FOR UP TO 30 DAYS. MAX DAILY AMOUNT 2 MG  Dispense: 60 Tablet; Refill: 2       Patient verbalizes understanding of plan of care as discussed above and informed if develops to contact provider immediately or seek care if worsening condition occurs. Follow-up and Dispositions    Return pending test results.

## 2023-04-28 ENCOUNTER — HOSPITAL ENCOUNTER (OUTPATIENT)
Dept: CT IMAGING | Age: 83
End: 2023-04-28
Attending: NURSE PRACTITIONER
Payer: MEDICARE

## 2023-04-28 ENCOUNTER — TRANSCRIBE ORDER (OUTPATIENT)
Dept: REGISTRATION | Age: 83
End: 2023-04-28

## 2023-04-28 ENCOUNTER — HOSPITAL ENCOUNTER (OUTPATIENT)
Dept: LAB | Age: 83
End: 2023-04-28
Attending: NURSE PRACTITIONER
Payer: MEDICARE

## 2023-04-28 DIAGNOSIS — M25.551 RIGHT HIP PAIN: ICD-10-CM

## 2023-04-28 DIAGNOSIS — M25.551 RIGHT HIP PAIN: Primary | ICD-10-CM

## 2023-04-28 LAB — CREAT SERPL-MCNC: 0.85 MG/DL (ref 0.55–1.02)

## 2023-04-28 PROCEDURE — 82565 ASSAY OF CREATININE: CPT

## 2023-04-28 PROCEDURE — 74011000636 HC RX REV CODE- 636: Performed by: NURSE PRACTITIONER

## 2023-04-28 PROCEDURE — 36415 COLL VENOUS BLD VENIPUNCTURE: CPT

## 2023-04-28 PROCEDURE — 72193 CT PELVIS W/DYE: CPT

## 2023-04-28 RX ADMIN — IOPAMIDOL 100 ML: 755 INJECTION, SOLUTION INTRAVENOUS at 08:51

## 2023-05-02 NOTE — PROGRESS NOTES
Please let patient know that CT shows mild to moderate hip and SI joint arthritis. Also notes an indirect left inguinal hernia. If she is not seeing improvement in symptoms, would like to refer her to orthopedic. Is showing signs of osteopenia (precursor to osteoporosis) and recommend getting adequate vitamin d and calcium along with doing daily weight bearing exercises. If any questions,let me know.

## 2023-05-22 RX ORDER — ATORVASTATIN CALCIUM 20 MG/1
20 TABLET, FILM COATED ORAL DAILY
COMMUNITY
Start: 2023-03-10

## 2023-05-22 RX ORDER — ALPRAZOLAM 1 MG/1
TABLET ORAL
COMMUNITY
Start: 2023-04-25

## 2023-05-22 RX ORDER — OMEPRAZOLE 40 MG/1
CAPSULE, DELAYED RELEASE ORAL DAILY
COMMUNITY
Start: 2023-05-01

## 2023-05-22 RX ORDER — METOPROLOL SUCCINATE 25 MG/1
25 TABLET, EXTENDED RELEASE ORAL 2 TIMES DAILY
COMMUNITY
Start: 2023-03-25

## 2023-05-22 RX ORDER — ALBUTEROL SULFATE 90 UG/1
AEROSOL, METERED RESPIRATORY (INHALATION)
COMMUNITY
Start: 2023-02-24

## 2023-07-28 DIAGNOSIS — K21.9 GASTRO-ESOPHAGEAL REFLUX DISEASE WITHOUT ESOPHAGITIS: ICD-10-CM

## 2023-07-29 RX ORDER — OMEPRAZOLE 40 MG/1
CAPSULE, DELAYED RELEASE ORAL
Qty: 90 CAPSULE | Refills: 1 | Status: SHIPPED | OUTPATIENT
Start: 2023-07-29

## 2023-08-03 ENCOUNTER — TRANSCRIBE ORDERS (OUTPATIENT)
Facility: HOSPITAL | Age: 83
End: 2023-08-03

## 2023-08-03 DIAGNOSIS — I34.0 NONRHEUMATIC MITRAL VALVE REGURGITATION: ICD-10-CM

## 2023-08-03 DIAGNOSIS — R94.31 ABNORMAL EKG: Primary | ICD-10-CM

## 2023-08-10 ENCOUNTER — TELEPHONE (OUTPATIENT)
Facility: CLINIC | Age: 83
End: 2023-08-10

## 2023-08-10 ENCOUNTER — HOSPITAL ENCOUNTER (OUTPATIENT)
Facility: HOSPITAL | Age: 83
Discharge: HOME | End: 2023-08-12
Payer: MEDICARE

## 2023-08-10 DIAGNOSIS — R94.31 ABNORMAL EKG: ICD-10-CM

## 2023-08-10 DIAGNOSIS — I34.0 NONRHEUMATIC MITRAL VALVE REGURGITATION: ICD-10-CM

## 2023-08-10 LAB
ECHO AO ROOT DIAM: 3.1 CM
ECHO AV AREA PEAK VELOCITY: 2.1 CM2
ECHO AV AREA VTI: 2 CM2
ECHO AV MEAN GRADIENT: 6 MMHG
ECHO AV MEAN VELOCITY: 1.2 M/S
ECHO AV PEAK GRADIENT: 11 MMHG
ECHO AV PEAK VELOCITY: 1.7 M/S
ECHO AV VELOCITY RATIO: 0.71
ECHO AV VTI: 38.9 CM
ECHO EST RA PRESSURE: 3 MMHG
ECHO LA DIAMETER: 4.5 CM
ECHO LA TO AORTIC ROOT RATIO: 1.45
ECHO LA VOL 2C: 54 ML (ref 22–52)
ECHO LA VOL 4C: 59 ML (ref 22–52)
ECHO LA VOL BP: 61 ML (ref 22–52)
ECHO LA VOLUME AREA LENGTH: 66 ML
ECHO LV E' LATERAL VELOCITY: 10 CM/S
ECHO LV E' SEPTAL VELOCITY: 8 CM/S
ECHO LV EDV A2C: 68 ML
ECHO LV EDV A4C: 61 ML
ECHO LV EDV BP: 68 ML (ref 56–104)
ECHO LV EJECTION FRACTION A2C: 64 %
ECHO LV EJECTION FRACTION A4C: 79 %
ECHO LV EJECTION FRACTION BIPLANE: 74 % (ref 55–100)
ECHO LV ESV A2C: 24 ML
ECHO LV ESV A4C: 13 ML
ECHO LV ESV BP: 18 ML (ref 19–49)
ECHO LV FRACTIONAL SHORTENING: 31 % (ref 28–44)
ECHO LV GLOBAL LONGITUDINAL STRAIN (GLS): -18.5 %
ECHO LV GLOBAL LONGITUDINAL STRAIN (GLS): -19.1 %
ECHO LV GLOBAL LONGITUDINAL STRAIN (GLS): -19.3 %
ECHO LV GLOBAL LONGITUDINAL STRAIN (GLS): -19.5 %
ECHO LV INTERNAL DIMENSION DIASTOLIC: 4.8 CM (ref 3.9–5.3)
ECHO LV INTERNAL DIMENSION SYSTOLIC: 3.3 CM
ECHO LV IVSD: 1.1 CM (ref 0.6–0.9)
ECHO LV MASS 2D: 181.9 G (ref 67–162)
ECHO LV POSTERIOR WALL DIASTOLIC: 1 CM (ref 0.6–0.9)
ECHO LV RELATIVE WALL THICKNESS RATIO: 0.42
ECHO LVOT AREA: 3.1 CM2
ECHO LVOT AV VTI INDEX: 0.66
ECHO LVOT DIAM: 2 CM
ECHO LVOT MEAN GRADIENT: 3 MMHG
ECHO LVOT PEAK GRADIENT: 6 MMHG
ECHO LVOT PEAK VELOCITY: 1.2 M/S
ECHO LVOT SV: 80.7 ML
ECHO LVOT VTI: 25.7 CM
ECHO MV A VELOCITY: 0.78 M/S
ECHO MV E DECELERATION TIME (DT): 177.7 MS
ECHO MV E VELOCITY: 0.77 M/S
ECHO MV E/A RATIO: 0.99
ECHO MV E/E' LATERAL: 7.7
ECHO MV E/E' RATIO (AVERAGED): 8.66
ECHO MV E/E' SEPTAL: 9.63
ECHO RA AREA 4C: 17.3 CM2
ECHO RIGHT VENTRICULAR SYSTOLIC PRESSURE (RVSP): 40 MMHG
ECHO RV INTERNAL DIMENSION: 3.6 CM
ECHO RV TAPSE: 2.9 CM (ref 1.7–?)
ECHO TV REGURGITANT MAX VELOCITY: 3.04 M/S
ECHO TV REGURGITANT PEAK GRADIENT: 37 MMHG

## 2023-08-10 PROCEDURE — 93306 TTE W/DOPPLER COMPLETE: CPT

## 2023-08-11 ENCOUNTER — TELEPHONE (OUTPATIENT)
Facility: CLINIC | Age: 83
End: 2023-08-11

## 2023-08-11 DIAGNOSIS — M81.0 AGE-RELATED OSTEOPOROSIS WITHOUT CURRENT PATHOLOGICAL FRACTURE: Primary | ICD-10-CM

## 2023-08-14 ENCOUNTER — OFFICE VISIT (OUTPATIENT)
Facility: CLINIC | Age: 83
End: 2023-08-14
Payer: MEDICARE

## 2023-08-14 VITALS
DIASTOLIC BLOOD PRESSURE: 84 MMHG | HEART RATE: 84 BPM | HEIGHT: 66 IN | WEIGHT: 192.4 LBS | RESPIRATION RATE: 20 BRPM | BODY MASS INDEX: 30.92 KG/M2 | OXYGEN SATURATION: 98 % | SYSTOLIC BLOOD PRESSURE: 148 MMHG | TEMPERATURE: 97.8 F

## 2023-08-14 DIAGNOSIS — I10 PRIMARY HYPERTENSION: ICD-10-CM

## 2023-08-14 DIAGNOSIS — N39.0 URINARY TRACT INFECTION WITHOUT HEMATURIA, SITE UNSPECIFIED: Primary | ICD-10-CM

## 2023-08-14 LAB
BILIRUBIN, URINE, POC: ABNORMAL
BLOOD URINE, POC: ABNORMAL
GLUCOSE URINE, POC: NEGATIVE
KETONES, URINE, POC: NEGATIVE
LEUKOCYTE ESTERASE, URINE, POC: ABNORMAL
NITRITE, URINE, POC: POSITIVE
PH, URINE, POC: 6 (ref 4.6–8)
PROTEIN,URINE, POC: 30
SPECIFIC GRAVITY, URINE, POC: 1.02 (ref 1–1.03)
URINALYSIS CLARITY, POC: CLEAR
URINALYSIS COLOR, POC: ABNORMAL
UROBILINOGEN, POC: ABNORMAL

## 2023-08-14 PROCEDURE — 99214 OFFICE O/P EST MOD 30 MIN: CPT | Performed by: NURSE PRACTITIONER

## 2023-08-14 PROCEDURE — 81003 URINALYSIS AUTO W/O SCOPE: CPT | Performed by: NURSE PRACTITIONER

## 2023-08-14 PROCEDURE — G8399 PT W/DXA RESULTS DOCUMENT: HCPCS | Performed by: NURSE PRACTITIONER

## 2023-08-14 PROCEDURE — 1036F TOBACCO NON-USER: CPT | Performed by: NURSE PRACTITIONER

## 2023-08-14 PROCEDURE — 3079F DIAST BP 80-89 MM HG: CPT | Performed by: NURSE PRACTITIONER

## 2023-08-14 PROCEDURE — 3077F SYST BP >= 140 MM HG: CPT | Performed by: NURSE PRACTITIONER

## 2023-08-14 PROCEDURE — G8417 CALC BMI ABV UP PARAM F/U: HCPCS | Performed by: NURSE PRACTITIONER

## 2023-08-14 PROCEDURE — 1090F PRES/ABSN URINE INCON ASSESS: CPT | Performed by: NURSE PRACTITIONER

## 2023-08-14 PROCEDURE — G8427 DOCREV CUR MEDS BY ELIG CLIN: HCPCS | Performed by: NURSE PRACTITIONER

## 2023-08-14 PROCEDURE — 1123F ACP DISCUSS/DSCN MKR DOCD: CPT | Performed by: NURSE PRACTITIONER

## 2023-08-14 RX ORDER — CIPROFLOXACIN 500 MG/1
500 TABLET, FILM COATED ORAL 2 TIMES DAILY
Qty: 14 TABLET | Refills: 0 | Status: SHIPPED | OUTPATIENT
Start: 2023-08-14 | End: 2023-08-21

## 2023-08-14 ASSESSMENT — ENCOUNTER SYMPTOMS: GASTROINTESTINAL NEGATIVE: 1

## 2023-08-14 NOTE — PROGRESS NOTES
Chief Complaint   Patient presents with    Urinary Pain    Urinary Frequency     For over a week. Pt was not able to void enough for a culture     No other c/o  Pt did not bring meds, went over list, pt confirmed      1. Have you been to the ER, urgent care clinic since your last visit? Hospitalized since your last visit? No    2. Have you seen or consulted any other health care providers outside of the [address] [place] [address] since your last visit? Include any pap smears or colon screening.  No
Aspects of this note have been generated using voice recognition software. Despite editing, there may be some syntax errors. Medication risks/benefits/costs/interactions/alternatives discussed with patient. Advised patient to call back or return to office if symptoms worsen/change/persist. If patient cannot reach us or should anything more severe/urgent arise she should proceed directly to the nearest emergency department. Discussed expected course/resolution/complications of diagnosis in detail with patient. Patient given a written after visit summary which includes her diagnoses, current medications and vitals. If referral was completed or labs during visit, advised to notify provider if they do not receive appointment or results within 1-2 weeks. Patient expressed understanding with the diagnosis and plan. Follow-up and Dispositions    Return in about 2 weeks (around 8/28/2023), or nurse call for bp readings, 4 mo follow up for labs/hypertension.        MARLEE Peace - NP

## 2023-08-16 LAB — BACTERIA UR CULT: ABNORMAL

## 2023-08-16 RX ORDER — NITROFURANTOIN 25; 75 MG/1; MG/1
100 CAPSULE ORAL 2 TIMES DAILY
Qty: 10 CAPSULE | Refills: 0 | Status: SHIPPED | OUTPATIENT
Start: 2023-08-16 | End: 2023-08-21

## 2023-08-17 DIAGNOSIS — J45.20 MILD INTERMITTENT ASTHMA, UNCOMPLICATED: ICD-10-CM

## 2023-08-17 RX ORDER — ALBUTEROL SULFATE 90 UG/1
AEROSOL, METERED RESPIRATORY (INHALATION)
Qty: 6.7 EACH | Refills: 3 | Status: SHIPPED | OUTPATIENT
Start: 2023-08-17

## 2023-08-24 ENCOUNTER — HOSPITAL ENCOUNTER (OUTPATIENT)
Facility: HOSPITAL | Age: 83
Discharge: HOME | End: 2023-08-24
Payer: MEDICARE

## 2023-08-24 ENCOUNTER — TRANSCRIBE ORDERS (OUTPATIENT)
Facility: HOSPITAL | Age: 83
End: 2023-08-24

## 2023-08-24 DIAGNOSIS — R06.00 DYSPNEA, UNSPECIFIED TYPE: Primary | ICD-10-CM

## 2023-08-24 DIAGNOSIS — R06.00 DYSPNEA, UNSPECIFIED TYPE: ICD-10-CM

## 2023-08-24 PROCEDURE — 71046 X-RAY EXAM CHEST 2 VIEWS: CPT

## 2023-08-29 ENCOUNTER — TELEPHONE (OUTPATIENT)
Facility: CLINIC | Age: 83
End: 2023-08-29

## 2023-09-14 ENCOUNTER — NURSE ONLY (OUTPATIENT)
Facility: CLINIC | Age: 83
End: 2023-09-14

## 2023-09-14 ENCOUNTER — TELEPHONE (OUTPATIENT)
Facility: CLINIC | Age: 83
End: 2023-09-14

## 2023-09-14 DIAGNOSIS — M81.0 AGE-RELATED OSTEOPOROSIS WITHOUT CURRENT PATHOLOGICAL FRACTURE: Primary | ICD-10-CM

## 2023-09-22 DIAGNOSIS — E78.5 HYPERLIPIDEMIA, UNSPECIFIED: ICD-10-CM

## 2023-09-23 DIAGNOSIS — I10 ESSENTIAL (PRIMARY) HYPERTENSION: ICD-10-CM

## 2023-09-24 RX ORDER — METOPROLOL SUCCINATE 25 MG/1
25 TABLET, EXTENDED RELEASE ORAL 2 TIMES DAILY
Qty: 180 TABLET | Refills: 1 | Status: SHIPPED | OUTPATIENT
Start: 2023-09-24

## 2023-09-24 RX ORDER — ATORVASTATIN CALCIUM 20 MG/1
20 TABLET, FILM COATED ORAL DAILY
Qty: 90 TABLET | Refills: 1 | Status: SHIPPED | OUTPATIENT
Start: 2023-09-24

## 2023-10-04 DIAGNOSIS — F41.9 ANXIETY DISORDER, UNSPECIFIED: ICD-10-CM

## 2023-10-04 RX ORDER — ALPRAZOLAM 1 MG/1
TABLET ORAL
Qty: 60 TABLET | Refills: 0 | Status: SHIPPED | OUTPATIENT
Start: 2023-10-04 | End: 2023-11-20

## 2023-11-09 ENCOUNTER — NURSE ONLY (OUTPATIENT)
Facility: CLINIC | Age: 83
End: 2023-11-09

## 2023-11-09 DIAGNOSIS — Z23 IMMUNIZATION DUE: Primary | ICD-10-CM

## 2023-11-20 DIAGNOSIS — F41.9 ANXIETY DISORDER, UNSPECIFIED: ICD-10-CM

## 2023-11-20 RX ORDER — ALPRAZOLAM 1 MG/1
TABLET ORAL
Qty: 60 TABLET | Refills: 0 | Status: SHIPPED | OUTPATIENT
Start: 2023-11-20 | End: 2023-12-20

## 2023-12-14 ENCOUNTER — OFFICE VISIT (OUTPATIENT)
Facility: CLINIC | Age: 83
End: 2023-12-14
Payer: MEDICARE

## 2023-12-14 VITALS
RESPIRATION RATE: 18 BRPM | DIASTOLIC BLOOD PRESSURE: 75 MMHG | HEART RATE: 76 BPM | WEIGHT: 192.8 LBS | OXYGEN SATURATION: 97 % | HEIGHT: 66 IN | BODY MASS INDEX: 30.98 KG/M2 | SYSTOLIC BLOOD PRESSURE: 139 MMHG | TEMPERATURE: 97.6 F

## 2023-12-14 DIAGNOSIS — Z00.00 MEDICARE ANNUAL WELLNESS VISIT, SUBSEQUENT: ICD-10-CM

## 2023-12-14 DIAGNOSIS — F41.9 ANXIETY DISORDER, UNSPECIFIED TYPE: ICD-10-CM

## 2023-12-14 DIAGNOSIS — E55.9 VITAMIN D DEFICIENCY, UNSPECIFIED: ICD-10-CM

## 2023-12-14 DIAGNOSIS — K21.9 GASTRO-ESOPHAGEAL REFLUX DISEASE WITHOUT ESOPHAGITIS: ICD-10-CM

## 2023-12-14 DIAGNOSIS — E53.8 VITAMIN B12 DEFICIENCY: ICD-10-CM

## 2023-12-14 DIAGNOSIS — E78.2 MIXED HYPERLIPIDEMIA: Primary | ICD-10-CM

## 2023-12-14 DIAGNOSIS — I10 ESSENTIAL (PRIMARY) HYPERTENSION: ICD-10-CM

## 2023-12-14 DIAGNOSIS — I25.10 ATHEROSCLEROSIS OF NATIVE CORONARY ARTERY OF NATIVE HEART WITHOUT ANGINA PECTORIS: ICD-10-CM

## 2023-12-14 DIAGNOSIS — M54.50 CHRONIC MIDLINE LOW BACK PAIN WITHOUT SCIATICA: ICD-10-CM

## 2023-12-14 DIAGNOSIS — G89.29 CHRONIC MIDLINE LOW BACK PAIN WITHOUT SCIATICA: ICD-10-CM

## 2023-12-14 DIAGNOSIS — Z86.73 HISTORY OF CVA (CEREBROVASCULAR ACCIDENT): ICD-10-CM

## 2023-12-14 DIAGNOSIS — J44.9 CHRONIC OBSTRUCTIVE PULMONARY DISEASE, UNSPECIFIED COPD TYPE (HCC): ICD-10-CM

## 2023-12-14 DIAGNOSIS — I34.0 NONRHEUMATIC MITRAL (VALVE) INSUFFICIENCY: ICD-10-CM

## 2023-12-14 DIAGNOSIS — M81.0 AGE-RELATED OSTEOPOROSIS WITHOUT CURRENT PATHOLOGICAL FRACTURE: ICD-10-CM

## 2023-12-14 DIAGNOSIS — Z85.3 PERSONAL HISTORY OF MALIGNANT NEOPLASM OF BREAST: ICD-10-CM

## 2023-12-14 PROCEDURE — 99214 OFFICE O/P EST MOD 30 MIN: CPT | Performed by: NURSE PRACTITIONER

## 2023-12-14 PROCEDURE — 1090F PRES/ABSN URINE INCON ASSESS: CPT | Performed by: NURSE PRACTITIONER

## 2023-12-14 PROCEDURE — 1036F TOBACCO NON-USER: CPT | Performed by: NURSE PRACTITIONER

## 2023-12-14 PROCEDURE — 3078F DIAST BP <80 MM HG: CPT | Performed by: NURSE PRACTITIONER

## 2023-12-14 PROCEDURE — 3023F SPIROM DOC REV: CPT | Performed by: NURSE PRACTITIONER

## 2023-12-14 PROCEDURE — 3075F SYST BP GE 130 - 139MM HG: CPT | Performed by: NURSE PRACTITIONER

## 2023-12-14 PROCEDURE — G8427 DOCREV CUR MEDS BY ELIG CLIN: HCPCS | Performed by: NURSE PRACTITIONER

## 2023-12-14 PROCEDURE — G8417 CALC BMI ABV UP PARAM F/U: HCPCS | Performed by: NURSE PRACTITIONER

## 2023-12-14 PROCEDURE — G8399 PT W/DXA RESULTS DOCUMENT: HCPCS | Performed by: NURSE PRACTITIONER

## 2023-12-14 PROCEDURE — G8484 FLU IMMUNIZE NO ADMIN: HCPCS | Performed by: NURSE PRACTITIONER

## 2023-12-14 PROCEDURE — G0439 PPPS, SUBSEQ VISIT: HCPCS | Performed by: NURSE PRACTITIONER

## 2023-12-14 PROCEDURE — 1123F ACP DISCUSS/DSCN MKR DOCD: CPT | Performed by: NURSE PRACTITIONER

## 2023-12-14 RX ORDER — FUROSEMIDE 20 MG/1
20 TABLET ORAL DAILY
COMMUNITY

## 2023-12-14 RX ORDER — ASPIRIN 81 MG/1
81 TABLET ORAL DAILY
COMMUNITY

## 2023-12-14 ASSESSMENT — PATIENT HEALTH QUESTIONNAIRE - PHQ9
SUM OF ALL RESPONSES TO PHQ QUESTIONS 1-9: 0
2. FEELING DOWN, DEPRESSED OR HOPELESS: 0
SUM OF ALL RESPONSES TO PHQ QUESTIONS 1-9: 0
1. LITTLE INTEREST OR PLEASURE IN DOING THINGS: 0
SUM OF ALL RESPONSES TO PHQ QUESTIONS 1-9: 0
SUM OF ALL RESPONSES TO PHQ QUESTIONS 1-9: 0
SUM OF ALL RESPONSES TO PHQ9 QUESTIONS 1 & 2: 0

## 2023-12-14 ASSESSMENT — LIFESTYLE VARIABLES
HOW OFTEN DO YOU HAVE A DRINK CONTAINING ALCOHOL: NEVER
HOW MANY STANDARD DRINKS CONTAINING ALCOHOL DO YOU HAVE ON A TYPICAL DAY: PATIENT DOES NOT DRINK

## 2023-12-15 LAB
25(OH)D3+25(OH)D2 SERPL-MCNC: 64.3 NG/ML (ref 30–100)
ALBUMIN SERPL-MCNC: 4 G/DL (ref 3.7–4.7)
ALBUMIN/GLOB SERPL: 1.7 {RATIO} (ref 1.2–2.2)
ALP SERPL-CCNC: 81 IU/L (ref 44–121)
ALT SERPL-CCNC: 10 IU/L (ref 0–32)
AST SERPL-CCNC: 18 IU/L (ref 0–40)
BASOPHILS # BLD AUTO: 0 X10E3/UL (ref 0–0.2)
BASOPHILS NFR BLD AUTO: 1 %
BILIRUB SERPL-MCNC: 0.3 MG/DL (ref 0–1.2)
BUN SERPL-MCNC: 17 MG/DL (ref 8–27)
BUN/CREAT SERPL: 20 (ref 12–28)
CALCIUM SERPL-MCNC: 8.9 MG/DL (ref 8.7–10.3)
CHLORIDE SERPL-SCNC: 104 MMOL/L (ref 96–106)
CHOLEST SERPL-MCNC: 166 MG/DL (ref 100–199)
CO2 SERPL-SCNC: 25 MMOL/L (ref 20–29)
CREAT SERPL-MCNC: 0.84 MG/DL (ref 0.57–1)
EGFRCR SERPLBLD CKD-EPI 2021: 69 ML/MIN/1.73
EOSINOPHIL # BLD AUTO: 0.3 X10E3/UL (ref 0–0.4)
EOSINOPHIL NFR BLD AUTO: 5 %
ERYTHROCYTE [DISTWIDTH] IN BLOOD BY AUTOMATED COUNT: 13.1 % (ref 11.7–15.4)
FOLATE SERPL-MCNC: 17.1 NG/ML
GLOBULIN SER CALC-MCNC: 2.3 G/DL (ref 1.5–4.5)
GLUCOSE SERPL-MCNC: 97 MG/DL (ref 70–99)
HCT VFR BLD AUTO: 37.9 % (ref 34–46.6)
HDLC SERPL-MCNC: 58 MG/DL
HGB BLD-MCNC: 11.7 G/DL (ref 11.1–15.9)
IMM GRANULOCYTES # BLD AUTO: 0 X10E3/UL (ref 0–0.1)
IMM GRANULOCYTES NFR BLD AUTO: 0 %
LDLC SERPL CALC-MCNC: 87 MG/DL (ref 0–99)
LYMPHOCYTES # BLD AUTO: 2.1 X10E3/UL (ref 0.7–3.1)
LYMPHOCYTES NFR BLD AUTO: 39 %
MCH RBC QN AUTO: 28.7 PG (ref 26.6–33)
MCHC RBC AUTO-ENTMCNC: 30.9 G/DL (ref 31.5–35.7)
MCV RBC AUTO: 93 FL (ref 79–97)
MONOCYTES # BLD AUTO: 0.4 X10E3/UL (ref 0.1–0.9)
MONOCYTES NFR BLD AUTO: 8 %
NEUTROPHILS # BLD AUTO: 2.6 X10E3/UL (ref 1.4–7)
NEUTROPHILS NFR BLD AUTO: 47 %
PLATELET # BLD AUTO: 165 X10E3/UL (ref 150–450)
POTASSIUM SERPL-SCNC: 4.5 MMOL/L (ref 3.5–5.2)
PROT SERPL-MCNC: 6.3 G/DL (ref 6–8.5)
RBC # BLD AUTO: 4.08 X10E6/UL (ref 3.77–5.28)
SODIUM SERPL-SCNC: 142 MMOL/L (ref 134–144)
SPECIMEN STATUS REPORT: NORMAL
TRIGL SERPL-MCNC: 117 MG/DL (ref 0–149)
TSH SERPL DL<=0.005 MIU/L-ACNC: 3 UIU/ML (ref 0.45–4.5)
VIT B12 SERPL-MCNC: 416 PG/ML (ref 232–1245)
VLDLC SERPL CALC-MCNC: 21 MG/DL (ref 5–40)
WBC # BLD AUTO: 5.4 X10E3/UL (ref 3.4–10.8)

## 2023-12-28 DIAGNOSIS — I10 ESSENTIAL (PRIMARY) HYPERTENSION: ICD-10-CM

## 2023-12-28 DIAGNOSIS — F41.9 ANXIETY DISORDER, UNSPECIFIED: ICD-10-CM

## 2023-12-28 RX ORDER — ALPRAZOLAM 1 MG/1
TABLET ORAL
Qty: 60 TABLET | Refills: 0 | Status: SHIPPED | OUTPATIENT
Start: 2023-12-28 | End: 2024-01-27

## 2023-12-28 RX ORDER — METOPROLOL SUCCINATE 25 MG/1
25 TABLET, EXTENDED RELEASE ORAL 2 TIMES DAILY
Qty: 180 TABLET | Refills: 1 | Status: SHIPPED | OUTPATIENT
Start: 2023-12-28

## 2024-02-08 DIAGNOSIS — F41.9 ANXIETY DISORDER, UNSPECIFIED: ICD-10-CM

## 2024-02-08 RX ORDER — ALPRAZOLAM 1 MG/1
TABLET ORAL
Qty: 60 TABLET | Refills: 0 | Status: SHIPPED | OUTPATIENT
Start: 2024-02-08 | End: 2024-03-09

## 2024-02-16 DIAGNOSIS — K21.9 GASTRO-ESOPHAGEAL REFLUX DISEASE WITHOUT ESOPHAGITIS: ICD-10-CM

## 2024-02-16 RX ORDER — OMEPRAZOLE 40 MG/1
CAPSULE, DELAYED RELEASE ORAL
Qty: 90 CAPSULE | Refills: 1 | Status: SHIPPED | OUTPATIENT
Start: 2024-02-16

## 2024-02-19 DIAGNOSIS — M81.0 AGE-RELATED OSTEOPOROSIS WITHOUT CURRENT PATHOLOGICAL FRACTURE: ICD-10-CM

## 2024-02-19 RX ORDER — DENOSUMAB 60 MG/ML
INJECTION SUBCUTANEOUS
Qty: 1 ML | Refills: 0 | Status: SHIPPED | OUTPATIENT
Start: 2024-02-19

## 2024-03-20 ENCOUNTER — NURSE ONLY (OUTPATIENT)
Facility: CLINIC | Age: 84
End: 2024-03-20
Payer: MEDICARE

## 2024-03-20 DIAGNOSIS — M81.0 AGE-RELATED OSTEOPOROSIS WITHOUT CURRENT PATHOLOGICAL FRACTURE: Primary | ICD-10-CM

## 2024-03-20 PROCEDURE — 96372 THER/PROPH/DIAG INJ SC/IM: CPT | Performed by: NURSE PRACTITIONER

## 2024-03-24 DIAGNOSIS — E78.5 HYPERLIPIDEMIA, UNSPECIFIED: ICD-10-CM

## 2024-03-24 RX ORDER — ATORVASTATIN CALCIUM 20 MG/1
20 TABLET, FILM COATED ORAL DAILY
Qty: 90 TABLET | Refills: 1 | Status: SHIPPED | OUTPATIENT
Start: 2024-03-24

## 2024-03-25 DIAGNOSIS — F41.9 ANXIETY DISORDER, UNSPECIFIED: ICD-10-CM

## 2024-03-25 RX ORDER — ALPRAZOLAM 1 MG/1
TABLET ORAL
Qty: 60 TABLET | Refills: 0 | Status: SHIPPED | OUTPATIENT
Start: 2024-03-25 | End: 2024-04-24

## 2024-05-10 DIAGNOSIS — F41.9 ANXIETY DISORDER, UNSPECIFIED: ICD-10-CM

## 2024-05-10 RX ORDER — ALPRAZOLAM 1 MG/1
TABLET ORAL
Qty: 60 TABLET | Refills: 0 | Status: SHIPPED | OUTPATIENT
Start: 2024-05-10 | End: 2024-06-09

## 2024-06-13 DIAGNOSIS — F41.9 ANXIETY DISORDER, UNSPECIFIED: ICD-10-CM

## 2024-06-13 RX ORDER — ALPRAZOLAM 1 MG/1
TABLET ORAL
Qty: 60 TABLET | Refills: 0 | Status: SHIPPED | OUTPATIENT
Start: 2024-06-13 | End: 2024-07-13

## 2024-06-28 DIAGNOSIS — J45.20 MILD INTERMITTENT ASTHMA, UNCOMPLICATED: ICD-10-CM

## 2024-06-28 RX ORDER — ALBUTEROL SULFATE 90 UG/1
AEROSOL, METERED RESPIRATORY (INHALATION)
Qty: 6.7 EACH | Refills: 3 | Status: SHIPPED | OUTPATIENT
Start: 2024-06-28

## 2024-07-26 DIAGNOSIS — F41.9 ANXIETY DISORDER, UNSPECIFIED: ICD-10-CM

## 2024-07-26 RX ORDER — ALPRAZOLAM 1 MG/1
TABLET ORAL
Qty: 30 TABLET | Refills: 0 | Status: SHIPPED | OUTPATIENT
Start: 2024-07-26 | End: 2024-08-25

## 2024-08-19 DIAGNOSIS — M81.0 AGE-RELATED OSTEOPOROSIS WITHOUT CURRENT PATHOLOGICAL FRACTURE: ICD-10-CM

## 2024-08-19 RX ORDER — DENOSUMAB 60 MG/ML
INJECTION SUBCUTANEOUS
Qty: 1 ML | Refills: 0 | Status: SHIPPED | OUTPATIENT
Start: 2024-08-19

## 2024-08-20 ENCOUNTER — OFFICE VISIT (OUTPATIENT)
Facility: CLINIC | Age: 84
End: 2024-08-20
Payer: MEDICARE

## 2024-08-20 VITALS
RESPIRATION RATE: 20 BRPM | OXYGEN SATURATION: 93 % | HEART RATE: 76 BPM | BODY MASS INDEX: 30.37 KG/M2 | SYSTOLIC BLOOD PRESSURE: 150 MMHG | TEMPERATURE: 98.3 F | DIASTOLIC BLOOD PRESSURE: 70 MMHG | WEIGHT: 189 LBS | HEIGHT: 66 IN

## 2024-08-20 DIAGNOSIS — I25.10 ATHEROSCLEROSIS OF NATIVE CORONARY ARTERY OF NATIVE HEART WITHOUT ANGINA PECTORIS: ICD-10-CM

## 2024-08-20 DIAGNOSIS — Z85.3 PERSONAL HISTORY OF MALIGNANT NEOPLASM OF BREAST: ICD-10-CM

## 2024-08-20 DIAGNOSIS — F41.1 GENERALIZED ANXIETY DISORDER: ICD-10-CM

## 2024-08-20 DIAGNOSIS — I10 PRIMARY HYPERTENSION: ICD-10-CM

## 2024-08-20 DIAGNOSIS — Z78.0 ENCOUNTER FOR OSTEOPOROSIS SCREENING IN ASYMPTOMATIC POSTMENOPAUSAL PATIENT: ICD-10-CM

## 2024-08-20 DIAGNOSIS — J44.9 CHRONIC OBSTRUCTIVE PULMONARY DISEASE, UNSPECIFIED COPD TYPE (HCC): ICD-10-CM

## 2024-08-20 DIAGNOSIS — Z12.31 SCREENING MAMMOGRAM, ENCOUNTER FOR: ICD-10-CM

## 2024-08-20 DIAGNOSIS — E53.8 VITAMIN B12 DEFICIENCY: ICD-10-CM

## 2024-08-20 DIAGNOSIS — R19.5 DARK STOOLS: Primary | ICD-10-CM

## 2024-08-20 DIAGNOSIS — K21.9 GASTRO-ESOPHAGEAL REFLUX DISEASE WITHOUT ESOPHAGITIS: ICD-10-CM

## 2024-08-20 DIAGNOSIS — I34.0 NONRHEUMATIC MITRAL (VALVE) INSUFFICIENCY: ICD-10-CM

## 2024-08-20 DIAGNOSIS — Z86.73 HISTORY OF CVA (CEREBROVASCULAR ACCIDENT): ICD-10-CM

## 2024-08-20 DIAGNOSIS — M54.50 CHRONIC MIDLINE LOW BACK PAIN WITHOUT SCIATICA: ICD-10-CM

## 2024-08-20 DIAGNOSIS — E78.2 MIXED HYPERLIPIDEMIA: ICD-10-CM

## 2024-08-20 DIAGNOSIS — G89.29 CHRONIC MIDLINE LOW BACK PAIN WITHOUT SCIATICA: ICD-10-CM

## 2024-08-20 DIAGNOSIS — M81.0 AGE-RELATED OSTEOPOROSIS WITHOUT CURRENT PATHOLOGICAL FRACTURE: ICD-10-CM

## 2024-08-20 DIAGNOSIS — Z13.820 ENCOUNTER FOR OSTEOPOROSIS SCREENING IN ASYMPTOMATIC POSTMENOPAUSAL PATIENT: ICD-10-CM

## 2024-08-20 DIAGNOSIS — E55.9 VITAMIN D DEFICIENCY, UNSPECIFIED: ICD-10-CM

## 2024-08-20 PROCEDURE — 1036F TOBACCO NON-USER: CPT | Performed by: NURSE PRACTITIONER

## 2024-08-20 PROCEDURE — 3023F SPIROM DOC REV: CPT | Performed by: NURSE PRACTITIONER

## 2024-08-20 PROCEDURE — 1123F ACP DISCUSS/DSCN MKR DOCD: CPT | Performed by: NURSE PRACTITIONER

## 2024-08-20 PROCEDURE — 3077F SYST BP >= 140 MM HG: CPT | Performed by: NURSE PRACTITIONER

## 2024-08-20 PROCEDURE — G8417 CALC BMI ABV UP PARAM F/U: HCPCS | Performed by: NURSE PRACTITIONER

## 2024-08-20 PROCEDURE — G8399 PT W/DXA RESULTS DOCUMENT: HCPCS | Performed by: NURSE PRACTITIONER

## 2024-08-20 PROCEDURE — 1090F PRES/ABSN URINE INCON ASSESS: CPT | Performed by: NURSE PRACTITIONER

## 2024-08-20 PROCEDURE — 99214 OFFICE O/P EST MOD 30 MIN: CPT | Performed by: NURSE PRACTITIONER

## 2024-08-20 PROCEDURE — G8427 DOCREV CUR MEDS BY ELIG CLIN: HCPCS | Performed by: NURSE PRACTITIONER

## 2024-08-20 PROCEDURE — 3078F DIAST BP <80 MM HG: CPT | Performed by: NURSE PRACTITIONER

## 2024-08-20 RX ORDER — ALPRAZOLAM 1 MG
1 TABLET ORAL 2 TIMES DAILY PRN
Qty: 60 TABLET | Refills: 2 | Status: SHIPPED | OUTPATIENT
Start: 2024-08-20 | End: 2024-09-19

## 2024-08-20 RX ORDER — LANOLIN ALCOHOL/MO/W.PET/CERES
9 CREAM (GRAM) TOPICAL DAILY
COMMUNITY

## 2024-08-20 SDOH — ECONOMIC STABILITY: FOOD INSECURITY: WITHIN THE PAST 12 MONTHS, YOU WORRIED THAT YOUR FOOD WOULD RUN OUT BEFORE YOU GOT MONEY TO BUY MORE.: NEVER TRUE

## 2024-08-20 SDOH — ECONOMIC STABILITY: FOOD INSECURITY: WITHIN THE PAST 12 MONTHS, THE FOOD YOU BOUGHT JUST DIDN'T LAST AND YOU DIDN'T HAVE MONEY TO GET MORE.: NEVER TRUE

## 2024-08-20 SDOH — ECONOMIC STABILITY: INCOME INSECURITY: HOW HARD IS IT FOR YOU TO PAY FOR THE VERY BASICS LIKE FOOD, HOUSING, MEDICAL CARE, AND HEATING?: NOT HARD AT ALL

## 2024-08-20 ASSESSMENT — PATIENT HEALTH QUESTIONNAIRE - PHQ9
SUM OF ALL RESPONSES TO PHQ QUESTIONS 1-9: 0
SUM OF ALL RESPONSES TO PHQ9 QUESTIONS 1 & 2: 0
2. FEELING DOWN, DEPRESSED OR HOPELESS: NOT AT ALL
SUM OF ALL RESPONSES TO PHQ QUESTIONS 1-9: 0
1. LITTLE INTEREST OR PLEASURE IN DOING THINGS: NOT AT ALL

## 2024-08-20 NOTE — PROGRESS NOTES
Subjective  Chief Complaint   Patient presents with    Hypertension     HPI:  Mana Sharma is a 83 y.o. female who is here today for follow up of chronic conditions with PMH of hyperlipidemia, GERD, hypertension, cva, anxiety, CAD, MV regurgitation, osteoporosis,vitamin d deficiency , copd, recurrent falls,breast  cancer and L1 fracture. Does see [facility]ology at [facility]. Only concern today is regarding some darkened stools. States that she has been eating a lot of dark chocolate and thinks this has contributed. Denies any abdominal pain, vomiting, abnormal weight loss, changes in appetite or other symptoms. She does not believe concerning. Denies any concerns otherwise. Would like to have labwork checked. Blood pressure elevated in office but reports this is in range at home.      Past Medical History:   Diagnosis Date    Anemia 6/24/2020    Anxiety 6/24/2020    Arthritis 6/24/2020    Breast cancer (HCC) 6/24/2020    GERD (gastroesophageal reflux disease) 6/24/2020    Heart disease 6/24/2020    Hyperlipidemia 6/24/2020    Hypertension 6/24/2020    Sleep disorder 6/24/2020    Stroke (cerebrum) ([facility] 6/24/2020 2012        Past Surgical History:   Procedure Laterality Date    BREAST LUMPECTOMY  1996    REVISION SUKUMAR-IMPLANT CAPSULE BREAST      TUBAL LIGATION          Family History   Problem Relation Age of Onset    Hypertension Sister     Alzheimer's Disease Sister     Cancer Brother     Hypertension Mother         Social History     Socioeconomic History    Marital status:      Spouse name: None    Number of children: None    Years of education: None    Highest education level: None   Tobacco Use    Smoking status: Former    Smokeless tobacco: Never   Substance and Sexual Activity    Alcohol use: Not Currently    Drug use: Never    Sexual activity: Not Currently     Partners: Male     Social Determinants of Health     Financial Resource Strain: Low Risk  (8/20/2024)    Overall Financial Resource Strain  severe/urgent arise she should proceed directly to the nearest emergency department.  Discussed expected course/resolution/complications of diagnosis in detail with patient.  Patient given a written after visit summary which includes her diagnoses, current medications and vitals.  If referral was completed or labs during visit, advised to notify provider if they do not receive appointment or results within 1-2 weeks.  Patient expressed understanding with the diagnosis and plan.    Return in about 1 week (around 8/27/2024), or lab visit and repeat bp. 3 mo f/u.     MARLEE Quiñones - NP

## 2024-08-20 NOTE — PROGRESS NOTES
Chief Complaint   Patient presents with    Hypertension     Follow up     Pt brought a list of the meds, went over list in chart, pt confirmed     \"Have you been to the ER, urgent care clinic since your last visit?  Hospitalized since your last visit?\"    NO    “Have you seen or consulted any other health care providers outside of [facility] since your last visit?”    NO            Click Here for Release of Records Request

## 2024-08-23 ENCOUNTER — TELEPHONE (OUTPATIENT)
Facility: CLINIC | Age: 84
End: 2024-08-23

## 2024-08-23 NOTE — TELEPHONE ENCOUNTER
Patient did not know when she needs to follow up with you again.      Let me know and will call her to schedule. She had labs drawn today

## 2024-08-24 LAB
ALBUMIN SERPL-MCNC: 4 G/DL (ref 3.7–4.7)
ALP SERPL-CCNC: 72 IU/L (ref 44–121)
ALT SERPL-CCNC: 9 IU/L (ref 0–32)
AST SERPL-CCNC: 16 IU/L (ref 0–40)
BASOPHILS # BLD AUTO: 0 X10E3/UL (ref 0–0.2)
BASOPHILS NFR BLD AUTO: 1 %
BILIRUB SERPL-MCNC: 0.4 MG/DL (ref 0–1.2)
BUN SERPL-MCNC: 21 MG/DL (ref 8–27)
BUN/CREAT SERPL: 23 (ref 12–28)
CALCIUM SERPL-MCNC: 9 MG/DL (ref 8.7–10.3)
CHLORIDE SERPL-SCNC: 105 MMOL/L (ref 96–106)
CHOLEST SERPL-MCNC: 164 MG/DL (ref 100–199)
CO2 SERPL-SCNC: 25 MMOL/L (ref 20–29)
CREAT SERPL-MCNC: 0.91 MG/DL (ref 0.57–1)
EGFRCR SERPLBLD CKD-EPI 2021: 63 ML/MIN/1.73
EOSINOPHIL # BLD AUTO: 0.2 X10E3/UL (ref 0–0.4)
EOSINOPHIL NFR BLD AUTO: 4 %
ERYTHROCYTE [DISTWIDTH] IN BLOOD BY AUTOMATED COUNT: 13.1 % (ref 11.7–15.4)
GLOBULIN SER CALC-MCNC: 2.3 G/DL (ref 1.5–4.5)
GLUCOSE SERPL-MCNC: 102 MG/DL (ref 70–99)
HCT VFR BLD AUTO: 39.6 % (ref 34–46.6)
HDLC SERPL-MCNC: 56 MG/DL
HGB BLD-MCNC: 12.8 G/DL (ref 11.1–15.9)
IMM GRANULOCYTES # BLD AUTO: 0 X10E3/UL (ref 0–0.1)
IMM GRANULOCYTES NFR BLD AUTO: 0 %
LDLC SERPL CALC-MCNC: 85 MG/DL (ref 0–99)
LYMPHOCYTES # BLD AUTO: 2.4 X10E3/UL (ref 0.7–3.1)
LYMPHOCYTES NFR BLD AUTO: 43 %
MCH RBC QN AUTO: 29.5 PG (ref 26.6–33)
MCHC RBC AUTO-ENTMCNC: 32.3 G/DL (ref 31.5–35.7)
MCV RBC AUTO: 91 FL (ref 79–97)
MONOCYTES # BLD AUTO: 0.4 X10E3/UL (ref 0.1–0.9)
MONOCYTES NFR BLD AUTO: 8 %
NEUTROPHILS # BLD AUTO: 2.5 X10E3/UL (ref 1.4–7)
NEUTROPHILS NFR BLD AUTO: 44 %
PLATELET # BLD AUTO: 168 X10E3/UL (ref 150–450)
POTASSIUM SERPL-SCNC: 4.4 MMOL/L (ref 3.5–5.2)
PROT SERPL-MCNC: 6.3 G/DL (ref 6–8.5)
RBC # BLD AUTO: 4.34 X10E6/UL (ref 3.77–5.28)
SODIUM SERPL-SCNC: 143 MMOL/L (ref 134–144)
SPECIMEN STATUS REPORT: NORMAL
TRIGL SERPL-MCNC: 130 MG/DL (ref 0–149)
TSH SERPL DL<=0.005 MIU/L-ACNC: 2.92 UIU/ML (ref 0.45–4.5)
VLDLC SERPL CALC-MCNC: 23 MG/DL (ref 5–40)
WBC # BLD AUTO: 5.6 X10E3/UL (ref 3.4–10.8)

## 2024-08-30 VITALS — SYSTOLIC BLOOD PRESSURE: 106 MMHG | DIASTOLIC BLOOD PRESSURE: 60 MMHG

## 2024-08-30 LAB
APPEARANCE UR: CLEAR
BACTERIA #/AREA URNS HPF: NORMAL /[HPF]
BILIRUB UR QL STRIP: NEGATIVE
CASTS URNS QL MICRO: NORMAL /LPF
COLOR UR: YELLOW
EPI CELLS #/AREA URNS HPF: NORMAL /HPF (ref 0–10)
GLUCOSE UR QL STRIP: NEGATIVE
HGB UR QL STRIP: NEGATIVE
KETONES UR QL STRIP: NEGATIVE
LEUKOCYTE ESTERASE UR QL STRIP: ABNORMAL
MICRO URNS: ABNORMAL
NITRITE UR QL STRIP: NEGATIVE
PH UR STRIP: 6 [PH] (ref 5–7.5)
PROT UR QL STRIP: NEGATIVE
RBC #/AREA URNS HPF: NORMAL /HPF (ref 0–2)
SP GR UR STRIP: 1.02 (ref 1–1.03)
UROBILINOGEN UR STRIP-MCNC: 0.2 MG/DL (ref 0.2–1)
WBC #/AREA URNS HPF: NORMAL /HPF (ref 0–5)

## 2024-09-09 DIAGNOSIS — K21.9 GASTRO-ESOPHAGEAL REFLUX DISEASE WITHOUT ESOPHAGITIS: ICD-10-CM

## 2024-09-09 RX ORDER — OMEPRAZOLE 40 MG/1
CAPSULE, DELAYED RELEASE ORAL
Qty: 90 CAPSULE | Refills: 1 | Status: SHIPPED | OUTPATIENT
Start: 2024-09-09

## 2024-09-30 ENCOUNTER — TELEPHONE (OUTPATIENT)
Facility: CLINIC | Age: 84
End: 2024-09-30

## 2024-09-30 NOTE — TELEPHONE ENCOUNTER
Patient request refill metoprolol succinate to [facility].  She has taken her last tablet.

## 2024-10-01 DIAGNOSIS — I10 ESSENTIAL (PRIMARY) HYPERTENSION: ICD-10-CM

## 2024-10-01 RX ORDER — METOPROLOL SUCCINATE 25 MG/1
25 TABLET, EXTENDED RELEASE ORAL 2 TIMES DAILY
Qty: 180 TABLET | Refills: 1 | Status: SHIPPED | OUTPATIENT
Start: 2024-10-01

## 2024-10-30 DIAGNOSIS — E78.5 HYPERLIPIDEMIA, UNSPECIFIED: ICD-10-CM

## 2024-10-31 RX ORDER — ATORVASTATIN CALCIUM 20 MG/1
20 TABLET, FILM COATED ORAL DAILY
Qty: 90 TABLET | Refills: 1 | Status: SHIPPED | OUTPATIENT
Start: 2024-10-31

## 2024-11-22 ENCOUNTER — OFFICE VISIT (OUTPATIENT)
Facility: CLINIC | Age: 84
End: 2024-11-22

## 2024-11-22 VITALS
TEMPERATURE: 97.7 F | HEIGHT: 66 IN | RESPIRATION RATE: 18 BRPM | SYSTOLIC BLOOD PRESSURE: 139 MMHG | WEIGHT: 189 LBS | DIASTOLIC BLOOD PRESSURE: 68 MMHG | BODY MASS INDEX: 30.37 KG/M2 | OXYGEN SATURATION: 98 % | HEART RATE: 61 BPM

## 2024-11-22 DIAGNOSIS — Z23 NEEDS FLU SHOT: ICD-10-CM

## 2024-11-22 DIAGNOSIS — Z86.73 HISTORY OF CVA (CEREBROVASCULAR ACCIDENT): ICD-10-CM

## 2024-11-22 DIAGNOSIS — J44.9 CHRONIC OBSTRUCTIVE PULMONARY DISEASE, UNSPECIFIED COPD TYPE (HCC): ICD-10-CM

## 2024-11-22 DIAGNOSIS — E53.8 VITAMIN B12 DEFICIENCY: ICD-10-CM

## 2024-11-22 DIAGNOSIS — E55.9 VITAMIN D DEFICIENCY, UNSPECIFIED: ICD-10-CM

## 2024-11-22 DIAGNOSIS — I10 ESSENTIAL (PRIMARY) HYPERTENSION: ICD-10-CM

## 2024-11-22 DIAGNOSIS — G89.29 CHRONIC MIDLINE LOW BACK PAIN WITHOUT SCIATICA: ICD-10-CM

## 2024-11-22 DIAGNOSIS — M54.50 CHRONIC MIDLINE LOW BACK PAIN WITHOUT SCIATICA: ICD-10-CM

## 2024-11-22 DIAGNOSIS — E78.2 MIXED HYPERLIPIDEMIA: Primary | ICD-10-CM

## 2024-11-22 DIAGNOSIS — F41.1 GENERALIZED ANXIETY DISORDER: ICD-10-CM

## 2024-11-22 DIAGNOSIS — I34.0 NONRHEUMATIC MITRAL (VALVE) INSUFFICIENCY: ICD-10-CM

## 2024-11-22 DIAGNOSIS — K21.9 GASTRO-ESOPHAGEAL REFLUX DISEASE WITHOUT ESOPHAGITIS: ICD-10-CM

## 2024-11-22 DIAGNOSIS — Z85.3 PERSONAL HISTORY OF MALIGNANT NEOPLASM OF BREAST: ICD-10-CM

## 2024-11-22 DIAGNOSIS — I25.10 ATHEROSCLEROSIS OF NATIVE CORONARY ARTERY OF NATIVE HEART WITHOUT ANGINA PECTORIS: ICD-10-CM

## 2024-11-22 DIAGNOSIS — M81.0 AGE-RELATED OSTEOPOROSIS WITHOUT CURRENT PATHOLOGICAL FRACTURE: ICD-10-CM

## 2024-11-22 RX ORDER — DENOSUMAB 60 MG/ML
60 INJECTION SUBCUTANEOUS ONCE
Qty: 1 ML | Refills: 0 | Status: SHIPPED | OUTPATIENT
Start: 2024-11-22 | End: 2024-11-22 | Stop reason: SDUPTHER

## 2024-11-22 RX ORDER — DENOSUMAB 60 MG/ML
60 INJECTION SUBCUTANEOUS ONCE
Qty: 1 ML | Refills: 0 | Status: SHIPPED | OUTPATIENT
Start: 2024-11-22 | End: 2024-11-22

## 2024-11-22 RX ORDER — ATORVASTATIN CALCIUM 20 MG/1
20 TABLET, FILM COATED ORAL DAILY
Qty: 90 TABLET | Refills: 1 | Status: SHIPPED | OUTPATIENT
Start: 2024-11-22

## 2024-11-22 RX ORDER — ALPRAZOLAM 1 MG/1
TABLET ORAL
COMMUNITY
Start: 2024-10-02

## 2024-11-22 NOTE — PROGRESS NOTES
Chief Complaint   Patient presents with    Hypertension     Follow up     Pt brought a list of the meds, went over list in chart, pt confirmed     No other concerns     \"Have you been to the ER, urgent care clinic since your last visit?  Hospitalized since your last visit?\"    NO    “Have you seen or consulted any other health care providers outside our system since your last visit?”    NO

## 2024-11-22 NOTE — PROGRESS NOTES
Subjective  Chief Complaint   Patient presents with    Hypertension     HPI:  Mana Sharma is a 84 y.o. female who is here today for follow up of chronic conditions with PMH of hyperlipidemia, GERD, hypertension, cva, anxiety, CAD, MV regurgitation, osteoporosis,vitamin d deficiency , copd, recurrent falls,breast  cancer and L1 fracture. Does see cardiology at [facility]. Taking medications but states that she did not receive her last prolia injection. Also has not had mammogram yet.      Past Medical History:   Diagnosis Date    Anemia 6/24/2020    Anxiety 6/24/2020    Arthritis 6/24/2020    Breast cancer (HCC) 6/24/2020    GERD (gastroesophageal reflux disease) 6/24/2020    Heart disease 6/24/2020    Hyperlipidemia 6/24/2020    Hypertension 6/24/2020    Sleep disorder 6/24/2020    Stroke (cerebrum) ([facility] 6/24/2020 2012        Past Surgical History:   Procedure Laterality Date    BREAST LUMPECTOMY  1996    REVISION SUKUMAR-IMPLANT CAPSULE BREAST      TUBAL LIGATION          Family History   Problem Relation Age of Onset    Hypertension Sister     Alzheimer's Disease Sister     Cancer Brother     Hypertension Mother         Social History     Socioeconomic History    Marital status:      Spouse name: None    Number of children: None    Years of education: None    Highest education level: None   Tobacco Use    Smoking status: Former    Smokeless tobacco: Never   Substance and Sexual Activity    Alcohol use: Not Currently    Drug use: Never    Sexual activity: Not Currently     Partners: Male     Social Determinants of Health     Financial Resource Strain: Low Risk  (8/20/2024)    Overall Financial Resource Strain (CARDIA)     Difficulty of Paying Living Expenses: Not hard at all   Food Insecurity: No Food Insecurity (8/20/2024)    Hunger Vital Sign     Worried About Running Out of Food in the Last Year: Never true     Ran Out of Food in the Last Year: Never true   Transportation Needs: Unknown (8/20/2024)

## 2024-11-27 DIAGNOSIS — M81.0 AGE-RELATED OSTEOPOROSIS WITHOUT CURRENT PATHOLOGICAL FRACTURE: ICD-10-CM

## 2024-11-27 RX ORDER — DENOSUMAB 60 MG/ML
60 INJECTION SUBCUTANEOUS ONCE
Qty: 1 ML | Refills: 0 | Status: SHIPPED | OUTPATIENT
Start: 2024-11-27 | End: 2024-11-27

## 2024-11-27 NOTE — TELEPHONE ENCOUNTER
Pt came by and call the mailorder pharmacy and they told her they never received the order, repending to you and I did call them and confirm the fax number

## 2025-01-27 DIAGNOSIS — F41.1 GENERALIZED ANXIETY DISORDER: Primary | ICD-10-CM

## 2025-01-27 RX ORDER — ALPRAZOLAM 1 MG/1
1 TABLET ORAL NIGHTLY PRN
Qty: 30 TABLET | Refills: 0 | Status: SHIPPED | OUTPATIENT
Start: 2025-01-27 | End: 2025-01-28 | Stop reason: SDUPTHER

## 2025-01-28 DIAGNOSIS — F41.1 GENERALIZED ANXIETY DISORDER: ICD-10-CM

## 2025-01-28 RX ORDER — ALPRAZOLAM 1 MG/1
1 TABLET ORAL NIGHTLY PRN
Qty: 30 TABLET | Refills: 0 | Status: SHIPPED | OUTPATIENT
Start: 2025-01-28 | End: 2025-02-27

## 2025-03-23 DIAGNOSIS — I10 ESSENTIAL (PRIMARY) HYPERTENSION: ICD-10-CM

## 2025-03-23 RX ORDER — METOPROLOL SUCCINATE 25 MG/1
25 TABLET, EXTENDED RELEASE ORAL 2 TIMES DAILY
Qty: 180 TABLET | Refills: 1 | Status: SHIPPED | OUTPATIENT
Start: 2025-03-23

## 2025-03-24 ENCOUNTER — TELEPHONE (OUTPATIENT)
Facility: CLINIC | Age: 85
End: 2025-03-24

## 2025-03-24 NOTE — TELEPHONE ENCOUNTER
Attempted to contact patient regarding upcoming Medicare wellness appointment and completion of HRA questionnaire. Unable to lvm, voice mailbox has not been set.

## 2025-03-25 ENCOUNTER — TELEPHONE (OUTPATIENT)
Facility: CLINIC | Age: 85
End: 2025-03-25

## 2025-03-25 ENCOUNTER — OFFICE VISIT (OUTPATIENT)
Facility: CLINIC | Age: 85
End: 2025-03-25
Payer: MEDICARE

## 2025-03-25 VITALS
BODY MASS INDEX: 30.37 KG/M2 | RESPIRATION RATE: 18 BRPM | TEMPERATURE: 98.7 F | WEIGHT: 189 LBS | SYSTOLIC BLOOD PRESSURE: 136 MMHG | HEIGHT: 66 IN | HEART RATE: 70 BPM | DIASTOLIC BLOOD PRESSURE: 77 MMHG | OXYGEN SATURATION: 96 %

## 2025-03-25 DIAGNOSIS — K21.9 GASTRO-ESOPHAGEAL REFLUX DISEASE WITHOUT ESOPHAGITIS: ICD-10-CM

## 2025-03-25 DIAGNOSIS — I25.10 ATHEROSCLEROSIS OF NATIVE CORONARY ARTERY OF NATIVE HEART WITHOUT ANGINA PECTORIS: ICD-10-CM

## 2025-03-25 DIAGNOSIS — J44.9 CHRONIC OBSTRUCTIVE PULMONARY DISEASE, UNSPECIFIED COPD TYPE (HCC): ICD-10-CM

## 2025-03-25 DIAGNOSIS — F41.1 GENERALIZED ANXIETY DISORDER: ICD-10-CM

## 2025-03-25 DIAGNOSIS — Z86.73 HISTORY OF CVA (CEREBROVASCULAR ACCIDENT): ICD-10-CM

## 2025-03-25 DIAGNOSIS — E53.8 VITAMIN B12 DEFICIENCY: ICD-10-CM

## 2025-03-25 DIAGNOSIS — E78.2 MIXED HYPERLIPIDEMIA: ICD-10-CM

## 2025-03-25 DIAGNOSIS — I34.0 NONRHEUMATIC MITRAL (VALVE) INSUFFICIENCY: ICD-10-CM

## 2025-03-25 DIAGNOSIS — I10 ESSENTIAL (PRIMARY) HYPERTENSION: ICD-10-CM

## 2025-03-25 DIAGNOSIS — G89.29 CHRONIC MIDLINE LOW BACK PAIN WITHOUT SCIATICA: ICD-10-CM

## 2025-03-25 DIAGNOSIS — M81.0 AGE-RELATED OSTEOPOROSIS WITHOUT CURRENT PATHOLOGICAL FRACTURE: ICD-10-CM

## 2025-03-25 DIAGNOSIS — E55.9 VITAMIN D DEFICIENCY, UNSPECIFIED: ICD-10-CM

## 2025-03-25 DIAGNOSIS — Z85.3 PERSONAL HISTORY OF MALIGNANT NEOPLASM OF BREAST: ICD-10-CM

## 2025-03-25 DIAGNOSIS — Z00.00 MEDICARE ANNUAL WELLNESS VISIT, SUBSEQUENT: Primary | ICD-10-CM

## 2025-03-25 DIAGNOSIS — M54.50 CHRONIC MIDLINE LOW BACK PAIN WITHOUT SCIATICA: ICD-10-CM

## 2025-03-25 DIAGNOSIS — H60.543 ECZEMA OF BOTH EXTERNAL EARS: ICD-10-CM

## 2025-03-25 PROCEDURE — 1159F MED LIST DOCD IN RCRD: CPT | Performed by: NURSE PRACTITIONER

## 2025-03-25 PROCEDURE — 1160F RVW MEDS BY RX/DR IN RCRD: CPT | Performed by: NURSE PRACTITIONER

## 2025-03-25 PROCEDURE — 99214 OFFICE O/P EST MOD 30 MIN: CPT | Performed by: NURSE PRACTITIONER

## 2025-03-25 PROCEDURE — G0439 PPPS, SUBSEQ VISIT: HCPCS | Performed by: NURSE PRACTITIONER

## 2025-03-25 PROCEDURE — 3078F DIAST BP <80 MM HG: CPT | Performed by: NURSE PRACTITIONER

## 2025-03-25 PROCEDURE — 3075F SYST BP GE 130 - 139MM HG: CPT | Performed by: NURSE PRACTITIONER

## 2025-03-25 PROCEDURE — 1123F ACP DISCUSS/DSCN MKR DOCD: CPT | Performed by: NURSE PRACTITIONER

## 2025-03-25 PROCEDURE — G8427 DOCREV CUR MEDS BY ELIG CLIN: HCPCS | Performed by: NURSE PRACTITIONER

## 2025-03-25 PROCEDURE — 1036F TOBACCO NON-USER: CPT | Performed by: NURSE PRACTITIONER

## 2025-03-25 PROCEDURE — 1090F PRES/ABSN URINE INCON ASSESS: CPT | Performed by: NURSE PRACTITIONER

## 2025-03-25 PROCEDURE — G8417 CALC BMI ABV UP PARAM F/U: HCPCS | Performed by: NURSE PRACTITIONER

## 2025-03-25 PROCEDURE — 4130F TOPICAL PREP RX AOE: CPT | Performed by: NURSE PRACTITIONER

## 2025-03-25 PROCEDURE — 3023F SPIROM DOC REV: CPT | Performed by: NURSE PRACTITIONER

## 2025-03-25 PROCEDURE — G8399 PT W/DXA RESULTS DOCUMENT: HCPCS | Performed by: NURSE PRACTITIONER

## 2025-03-25 RX ORDER — DENOSUMAB 60 MG/ML
60 INJECTION SUBCUTANEOUS ONCE
Qty: 1 ML | Refills: 0 | Status: SHIPPED | OUTPATIENT
Start: 2025-03-25 | End: 2025-03-25

## 2025-03-25 RX ORDER — ALPRAZOLAM 1 MG/1
1 TABLET ORAL NIGHTLY PRN
Qty: 30 TABLET | Refills: 0 | Status: SHIPPED | OUTPATIENT
Start: 2025-03-25 | End: 2025-04-24

## 2025-03-25 RX ORDER — FLUOCINOLONE ACETONIDE 0.11 MG/ML
OIL AURICULAR (OTIC)
Qty: 20 ML | Refills: 0 | Status: SHIPPED | OUTPATIENT
Start: 2025-03-25

## 2025-03-25 SDOH — ECONOMIC STABILITY: FOOD INSECURITY: WITHIN THE PAST 12 MONTHS, THE FOOD YOU BOUGHT JUST DIDN'T LAST AND YOU DIDN'T HAVE MONEY TO GET MORE.: NEVER TRUE

## 2025-03-25 SDOH — ECONOMIC STABILITY: FOOD INSECURITY: WITHIN THE PAST 12 MONTHS, YOU WORRIED THAT YOUR FOOD WOULD RUN OUT BEFORE YOU GOT MONEY TO BUY MORE.: NEVER TRUE

## 2025-03-25 ASSESSMENT — PATIENT HEALTH QUESTIONNAIRE - PHQ9
SUM OF ALL RESPONSES TO PHQ QUESTIONS 1-9: 0
2. FEELING DOWN, DEPRESSED OR HOPELESS: NOT AT ALL
SUM OF ALL RESPONSES TO PHQ QUESTIONS 1-9: 0
1. LITTLE INTEREST OR PLEASURE IN DOING THINGS: NOT AT ALL

## 2025-03-25 ASSESSMENT — ENCOUNTER SYMPTOMS
VOICE CHANGE: 0
SINUS PRESSURE: 0
SORE THROAT: 0
RESPIRATORY NEGATIVE: 1
SINUS PAIN: 0
GASTROINTESTINAL NEGATIVE: 1
EYES NEGATIVE: 1

## 2025-03-25 NOTE — PROGRESS NOTES
Subjective  Chief Complaint   Patient presents with    Medicare AWV    Hypertension     HPI:  Mana Sharma is a 84 y.o. female who is here today for follow up of chronic conditions with PMH of hyperlipidemia, GERD, hypertension, cva, anxiety, CAD, MV regurgitation, osteoporosis,vitamin d deficiency , COPD, recurrent falls, breast cancer and L1 fracture. Does see cardiology at [facility]. Taking medications but states that she did not receive her last prolia injection despite medication being re-sent. Also has not had mammogram yet. Is experiencing increased itching and flaking from both ears. Admits she has been using cutips to try to scratch ears. Denies any pain or drainage from ears.      Past Medical History:   Diagnosis Date    Anemia 6/24/2020    Anxiety 6/24/2020    Arthritis 6/24/2020    Breast cancer (HCC) 6/24/2020    GERD (gastroesophageal reflux disease) 6/24/2020    Heart disease 6/24/2020    Hyperlipidemia 6/24/2020    Hypertension 6/24/2020    Sleep disorder 6/24/2020    Stroke (cerebrum) ([facility]) 6/24/2020 2012        Past Surgical History:   Procedure Laterality Date    BREAST LUMPECTOMY  1996    REVISION SUKUMAR-IMPLANT CAPSULE BREAST      TUBAL LIGATION          Family History   Problem Relation Age of Onset    Hypertension Sister     Alzheimer's Disease Sister     Cancer Brother     Hypertension Mother         Social History     Socioeconomic History    Marital status:      Spouse name: None    Number of children: None    Years of education: None    Highest education level: None   Tobacco Use    Smoking status: Former    Smokeless tobacco: Never   Substance and Sexual Activity    Alcohol use: Not Currently    Drug use: Never    Sexual activity: Not Currently     Partners: Male     Social Drivers of Health     Financial Resource Strain: Low Risk  (8/20/2024)    Overall Financial Resource Strain (CARDIA)     Difficulty of Paying Living Expenses: Not hard at all   Food Insecurity: No Food

## 2025-03-25 NOTE — PATIENT INSTRUCTIONS
Learning About Being Active as an Older Adult  Why is being active important as you get older?     Being active is one of the best things you can do for your health. And it's never too late to start. Being active--or getting active, if you aren't already--has definite benefits. It can:  Give you more energy,  Keep your mind sharp.  Improve balance to reduce your risk of falls.  Help you manage chronic illness with fewer medicines.  No matter how old you are, how fit you are, or what health problems you have, there is a form of activity that will work for you. And the more physical activity you can do, the better your overall health will be.  What kinds of activity can help you stay healthy?  Being more active will make your daily activities easier. Physical activity includes planned exercise and things you do in daily life. There are four types of activity:  Aerobic.  Doing aerobic activity makes your heart and lungs strong.  Includes walking, dancing, and gardening.  Aim for at least 2½ hours spread throughout the week.  It improves your energy and can help you sleep better.  Muscle-strengthening.  This type of activity can help maintain muscle and strengthen bones.  Includes climbing stairs, using resistance bands, and lifting or carrying heavy loads.  Aim for at least twice a week.  It can help protect the knees and other joints.  Stretching.  Stretching gives you better range of motion in joints and muscles.  Includes upper arm stretches, calf stretches, and gentle yoga.  Aim for at least twice a week, preferably after your muscles are warmed up from other activities.  It can help you function better in daily life.  Balancing.  This helps you stay coordinated and have good posture.  Includes heel-to-toe walking, heather chi, and certain types of yoga.  Aim for at least 3 days a week.  It can reduce your risk of falling.  Even if you have a hard time meeting the recommendations, it's better to be more active

## 2025-03-25 NOTE — PROGRESS NOTES
Chief Complaint   Patient presents with    Medicare AWV    Hypertension     Wellness    Pt did not bring meds, went over list, pt confirmed     \"Have you been to the ER, urgent care clinic since your last visit?  Hospitalized since your last visit?\"    NO    “Have you seen or consulted any other health care providers outside our system since your last visit?”    NO

## 2025-04-04 ENCOUNTER — TELEPHONE (OUTPATIENT)
Facility: CLINIC | Age: 85
End: 2025-04-04

## 2025-04-04 NOTE — TELEPHONE ENCOUNTER
Renetta, at [facility], left message.  She was rescheduling delivery of Prolia 60 mg.  Should arrive around 04/11/2025

## 2025-04-09 ENCOUNTER — TELEPHONE (OUTPATIENT)
Facility: CLINIC | Age: 85
End: 2025-04-09

## 2025-04-09 NOTE — TELEPHONE ENCOUNTER
Pt spoke with [facility] and they need prolia called in again so they can send out on 4/11.     [phone] (p) [facility]

## 2025-04-15 ENCOUNTER — CLINICAL SUPPORT (OUTPATIENT)
Facility: CLINIC | Age: 85
End: 2025-04-15

## 2025-04-15 DIAGNOSIS — M81.0 AGE-RELATED OSTEOPOROSIS WITHOUT CURRENT PATHOLOGICAL FRACTURE: Primary | ICD-10-CM

## 2025-04-15 NOTE — PROGRESS NOTES
Patient in for Prolia injection.  Provided own medication.  60 mg given SQ in left upper arm.  Patient tolerated procedure well. No complaints or problems noted.

## 2025-05-07 ENCOUNTER — OFFICE VISIT (OUTPATIENT)
Facility: CLINIC | Age: 85
End: 2025-05-07
Payer: MEDICARE

## 2025-05-07 VITALS
BODY MASS INDEX: 30.05 KG/M2 | RESPIRATION RATE: 20 BRPM | OXYGEN SATURATION: 94 % | SYSTOLIC BLOOD PRESSURE: 177 MMHG | HEART RATE: 86 BPM | TEMPERATURE: 97.2 F | WEIGHT: 187 LBS | DIASTOLIC BLOOD PRESSURE: 73 MMHG | HEIGHT: 66 IN

## 2025-05-07 DIAGNOSIS — I10 ESSENTIAL (PRIMARY) HYPERTENSION: ICD-10-CM

## 2025-05-07 DIAGNOSIS — K21.9 GASTRO-ESOPHAGEAL REFLUX DISEASE WITHOUT ESOPHAGITIS: ICD-10-CM

## 2025-05-07 DIAGNOSIS — N39.0 URINARY TRACT INFECTION WITHOUT HEMATURIA, SITE UNSPECIFIED: Primary | ICD-10-CM

## 2025-05-07 LAB
BILIRUBIN, URINE, POC: NEGATIVE
BLOOD URINE, POC: ABNORMAL
GLUCOSE URINE, POC: NEGATIVE
KETONES, URINE, POC: NEGATIVE
LEUKOCYTE ESTERASE, URINE, POC: ABNORMAL
NITRITE, URINE, POC: NEGATIVE
PH, URINE, POC: 6.5 (ref 4.6–8)
PROTEIN,URINE, POC: NEGATIVE
SPECIFIC GRAVITY, URINE, POC: 1.01 (ref 1–1.03)
URINALYSIS CLARITY, POC: CLEAR
URINALYSIS COLOR, POC: ABNORMAL
UROBILINOGEN, POC: ABNORMAL

## 2025-05-07 PROCEDURE — G8399 PT W/DXA RESULTS DOCUMENT: HCPCS | Performed by: NURSE PRACTITIONER

## 2025-05-07 PROCEDURE — 1036F TOBACCO NON-USER: CPT | Performed by: NURSE PRACTITIONER

## 2025-05-07 PROCEDURE — 99213 OFFICE O/P EST LOW 20 MIN: CPT | Performed by: NURSE PRACTITIONER

## 2025-05-07 PROCEDURE — 1090F PRES/ABSN URINE INCON ASSESS: CPT | Performed by: NURSE PRACTITIONER

## 2025-05-07 PROCEDURE — G8417 CALC BMI ABV UP PARAM F/U: HCPCS | Performed by: NURSE PRACTITIONER

## 2025-05-07 PROCEDURE — 1159F MED LIST DOCD IN RCRD: CPT | Performed by: NURSE PRACTITIONER

## 2025-05-07 PROCEDURE — 81003 URINALYSIS AUTO W/O SCOPE: CPT | Performed by: NURSE PRACTITIONER

## 2025-05-07 PROCEDURE — 1123F ACP DISCUSS/DSCN MKR DOCD: CPT | Performed by: NURSE PRACTITIONER

## 2025-05-07 PROCEDURE — 3078F DIAST BP <80 MM HG: CPT | Performed by: NURSE PRACTITIONER

## 2025-05-07 PROCEDURE — 1160F RVW MEDS BY RX/DR IN RCRD: CPT | Performed by: NURSE PRACTITIONER

## 2025-05-07 PROCEDURE — G8427 DOCREV CUR MEDS BY ELIG CLIN: HCPCS | Performed by: NURSE PRACTITIONER

## 2025-05-07 PROCEDURE — 3077F SYST BP >= 140 MM HG: CPT | Performed by: NURSE PRACTITIONER

## 2025-05-07 RX ORDER — ALPRAZOLAM 1 MG/1
TABLET ORAL
COMMUNITY
Start: 2025-04-24

## 2025-05-07 RX ORDER — CIPROFLOXACIN 500 MG/1
500 TABLET, FILM COATED ORAL 2 TIMES DAILY
Qty: 14 TABLET | Refills: 0 | Status: SHIPPED | OUTPATIENT
Start: 2025-05-07 | End: 2025-05-14

## 2025-05-07 RX ORDER — OMEPRAZOLE 40 MG/1
CAPSULE, DELAYED RELEASE ORAL
Qty: 90 CAPSULE | Refills: 1 | Status: SHIPPED | OUTPATIENT
Start: 2025-05-07

## 2025-05-07 ASSESSMENT — ENCOUNTER SYMPTOMS
CONSTIPATION: 0
VOMITING: 0
NAUSEA: 1
DIARRHEA: 0
RESPIRATORY NEGATIVE: 1
ABDOMINAL PAIN: 1

## 2025-05-07 NOTE — PROGRESS NOTES
Chief Complaint   Patient presents with    Urinary Pain    Urinary Frequency     For about a week      1. Have you been to the ER, urgent care clinic since your last visit?  Hospitalized since your last visit?No    2. Have you seen or consulted any other health care providers outside of the [facility] since your last visit?  Include any pap smears or colon screening. No

## 2025-05-07 NOTE — PROGRESS NOTES
Subjective  Chief Complaint   Patient presents with    Urinary Pain    Urinary Frequency     For about a week      History of Present Illness  The patient presents for evaluation of abdominal pain and a possible urinary tract infection.    She reports experiencing abdominal discomfort, describing it as a sensation of squeezing lower pelvis. There have been no episodes of vomiting, diarrhea or constipation. No fever or flank pain. Some increase in urinary frequency noted.        Past Medical History:   Diagnosis Date    Anemia 6/24/2020    Anxiety 6/24/2020    Arthritis 6/24/2020    Breast cancer ([facility]) 6/24/2020    GERD (gastroesophageal reflux disease) 6/24/2020    Heart disease 6/24/2020    Hyperlipidemia 6/24/2020    Hypertension 6/24/2020    Sleep disorder 6/24/2020    Stroke (cerebrum) ([facility]) 6/24/2020 2012        Past Surgical History:   Procedure Laterality Date    BREAST LUMPECTOMY  1996    REVISION SUKUMAR-IMPLANT CAPSULE BREAST      TUBAL LIGATION          Family History   Problem Relation Age of Onset    Hypertension Sister     Alzheimer's Disease Sister     Cancer Brother     Hypertension Mother         Social History     Socioeconomic History    Marital status:      Spouse name: None    Number of children: None    Years of education: None    Highest education level: None   Tobacco Use    Smoking status: Former    Smokeless tobacco: Never   Substance and Sexual Activity    Alcohol use: Not Currently    Drug use: Never    Sexual activity: Not Currently     Partners: Male     Social Drivers of Health     Financial Resource Strain: Low Risk  (8/20/2024)    Overall Financial Resource Strain (CARDIA)     Difficulty of Paying Living Expenses: Not hard at all   Food Insecurity: No Food Insecurity (3/25/2025)    Hunger Vital Sign     Worried About Running Out of Food in the Last Year: Never true     Ran Out of Food in the Last Year: Never true   Transportation Needs: No Transportation Needs (3/25/2025)

## 2025-08-07 ENCOUNTER — HOSPITAL ENCOUNTER (OUTPATIENT)
Facility: HOSPITAL | Age: 85
Setting detail: SPECIMEN
Discharge: HOME | End: 2025-08-10
Payer: MEDICARE

## 2025-08-07 ENCOUNTER — TRANSCRIBE ORDERS (OUTPATIENT)
Facility: HOSPITAL | Age: 85
End: 2025-08-07

## 2025-08-07 DIAGNOSIS — R42 DIZZINESS AND GIDDINESS: ICD-10-CM

## 2025-08-07 DIAGNOSIS — R42 DIZZINESS AND GIDDINESS: Primary | ICD-10-CM

## 2025-08-07 LAB
ANION GAP SERPL CALC-SCNC: 3 MMOL/L (ref 2–12)
BASOPHILS # BLD: 0.04 K/UL (ref 0–0.1)
BASOPHILS NFR BLD: 0.7 % (ref 0–1)
BUN SERPL-MCNC: 21 MG/DL (ref 6–20)
BUN/CREAT SERPL: 22 (ref 12–20)
CA-I BLD-MCNC: 9.1 MG/DL (ref 8.5–10.1)
CHLORIDE SERPL-SCNC: 105 MMOL/L (ref 97–108)
CO2 SERPL-SCNC: 33 MMOL/L (ref 21–32)
CREAT SERPL-MCNC: 0.97 MG/DL (ref 0.55–1.02)
DIFFERENTIAL METHOD BLD: NORMAL
EOSINOPHIL # BLD: 0.19 K/UL (ref 0–0.4)
EOSINOPHIL NFR BLD: 3.2 % (ref 0–7)
ERYTHROCYTE [DISTWIDTH] IN BLOOD BY AUTOMATED COUNT: 13.8 % (ref 11.5–14.5)
GLUCOSE SERPL-MCNC: 99 MG/DL (ref 65–100)
HCT VFR BLD AUTO: 39 % (ref 35–47)
HGB BLD-MCNC: 12.3 G/DL (ref 11.5–16)
IMM GRANULOCYTES # BLD AUTO: 0.01 K/UL (ref 0–0.04)
IMM GRANULOCYTES NFR BLD AUTO: 0.2 % (ref 0–0.5)
LYMPHOCYTES # BLD: 2.35 K/UL (ref 0.8–3.5)
LYMPHOCYTES NFR BLD: 39.4 % (ref 12–49)
MAGNESIUM SERPL-MCNC: 2.2 MG/DL (ref 1.6–2.4)
MCH RBC QN AUTO: 29.3 PG (ref 26–34)
MCHC RBC AUTO-ENTMCNC: 31.5 G/DL (ref 30–36.5)
MCV RBC AUTO: 92.9 FL (ref 80–99)
MONOCYTES # BLD: 0.5 K/UL (ref 0–1)
MONOCYTES NFR BLD: 8.4 % (ref 5–13)
NEUTS SEG # BLD: 2.87 K/UL (ref 1.8–8)
NEUTS SEG NFR BLD: 48.1 % (ref 32–75)
NRBC # BLD: 0 K/UL (ref 0–0.01)
NRBC BLD-RTO: 0 PER 100 WBC
PLATELET # BLD AUTO: 179 K/UL (ref 150–400)
PMV BLD AUTO: 10 FL (ref 8.9–12.9)
POTASSIUM SERPL-SCNC: 4.1 MMOL/L (ref 3.5–5.1)
RBC # BLD AUTO: 4.2 M/UL (ref 3.8–5.2)
SODIUM SERPL-SCNC: 141 MMOL/L (ref 136–145)
TSH SERPL DL<=0.05 MIU/L-ACNC: 1.84 UIU/ML (ref 0.36–3.74)
WBC # BLD AUTO: 6 K/UL (ref 3.6–11)

## 2025-08-07 PROCEDURE — 84443 ASSAY THYROID STIM HORMONE: CPT

## 2025-08-07 PROCEDURE — 36415 COLL VENOUS BLD VENIPUNCTURE: CPT

## 2025-08-07 PROCEDURE — 83735 ASSAY OF MAGNESIUM: CPT

## 2025-08-07 PROCEDURE — 85025 COMPLETE CBC W/AUTO DIFF WBC: CPT

## 2025-08-07 PROCEDURE — 80048 BASIC METABOLIC PNL TOTAL CA: CPT
